# Patient Record
Sex: FEMALE | Race: WHITE | NOT HISPANIC OR LATINO | Employment: OTHER | URBAN - METROPOLITAN AREA
[De-identification: names, ages, dates, MRNs, and addresses within clinical notes are randomized per-mention and may not be internally consistent; named-entity substitution may affect disease eponyms.]

---

## 2018-12-10 ENCOUNTER — HOSPITAL ENCOUNTER (OUTPATIENT)
Dept: CT IMAGING | Facility: HOSPITAL | Age: 53
Discharge: HOME | End: 2018-12-10
Attending: NURSE PRACTITIONER | Admitting: NURSE PRACTITIONER

## 2018-12-10 LAB — CREAT BLDA-MCNC: 0.5 MG/DL (ref 0.6–1.3)

## 2019-02-28 ENCOUNTER — CONVERSION ENCOUNTER (OUTPATIENT)
Dept: URGENT CARE | Facility: CLINIC | Age: 54
End: 2019-02-28

## 2019-06-04 VITALS
HEIGHT: 63 IN | WEIGHT: 203.8 LBS | DIASTOLIC BLOOD PRESSURE: 100 MMHG | SYSTOLIC BLOOD PRESSURE: 171 MMHG | OXYGEN SATURATION: 97 % | BODY MASS INDEX: 36.11 KG/M2 | HEART RATE: 79 BPM

## 2021-05-27 ENCOUNTER — HOSPITAL ENCOUNTER (OUTPATIENT)
Dept: CARDIOLOGY | Facility: HOSPITAL | Age: 56
Discharge: HOME | End: 2021-05-27

## 2021-05-27 ENCOUNTER — TRANSCRIBE ORDERS (OUTPATIENT)
Dept: ADMINISTRATIVE | Facility: HOSPITAL | Age: 56
End: 2021-05-27

## 2021-05-27 ENCOUNTER — LAB (OUTPATIENT)
Dept: LAB | Facility: HOSPITAL | Age: 56
End: 2021-05-27

## 2021-05-27 DIAGNOSIS — I10 BENIGN HYPERTENSION: Primary | ICD-10-CM

## 2021-05-27 DIAGNOSIS — Z01.818 PRE-OP TESTING: ICD-10-CM

## 2021-05-27 DIAGNOSIS — I10 BENIGN HYPERTENSION: ICD-10-CM

## 2021-05-27 LAB
ANION GAP SERPL CALCULATED.3IONS-SCNC: 12.7 MMOL/L (ref 5–15)
APTT PPP: 25.4 SECONDS (ref 24–31)
BASOPHILS # BLD AUTO: 0.11 10*3/MM3 (ref 0–0.2)
BASOPHILS NFR BLD AUTO: 0.8 % (ref 0–1.5)
BUN SERPL-MCNC: 8 MG/DL (ref 6–20)
BUN/CREAT SERPL: 13.6 (ref 7–25)
CALCIUM SPEC-SCNC: 9 MG/DL (ref 8.6–10.5)
CHLORIDE SERPL-SCNC: 103 MMOL/L (ref 98–107)
CO2 SERPL-SCNC: 21.3 MMOL/L (ref 22–29)
CREAT SERPL-MCNC: 0.59 MG/DL (ref 0.57–1)
DEPRECATED RDW RBC AUTO: 38.3 FL (ref 37–54)
EOSINOPHIL # BLD AUTO: 0.36 10*3/MM3 (ref 0–0.4)
EOSINOPHIL NFR BLD AUTO: 2.7 % (ref 0.3–6.2)
ERYTHROCYTE [DISTWIDTH] IN BLOOD BY AUTOMATED COUNT: 12.5 % (ref 12.3–15.4)
GFR SERPL CREATININE-BSD FRML MDRD: 105 ML/MIN/1.73
GLUCOSE SERPL-MCNC: 302 MG/DL (ref 65–99)
HCT VFR BLD AUTO: 46.5 % (ref 34–46.6)
HGB BLD-MCNC: 15.4 G/DL (ref 12–15.9)
IMM GRANULOCYTES # BLD AUTO: 0.13 10*3/MM3 (ref 0–0.05)
IMM GRANULOCYTES NFR BLD AUTO: 1 % (ref 0–0.5)
INR PPP: <0.93 (ref 2–3)
LYMPHOCYTES # BLD AUTO: 4.15 10*3/MM3 (ref 0.7–3.1)
LYMPHOCYTES NFR BLD AUTO: 31.3 % (ref 19.6–45.3)
MCH RBC QN AUTO: 28.4 PG (ref 26.6–33)
MCHC RBC AUTO-ENTMCNC: 33.1 G/DL (ref 31.5–35.7)
MCV RBC AUTO: 85.6 FL (ref 79–97)
MONOCYTES # BLD AUTO: 0.69 10*3/MM3 (ref 0.1–0.9)
MONOCYTES NFR BLD AUTO: 5.2 % (ref 5–12)
NEUTROPHILS NFR BLD AUTO: 59 % (ref 42.7–76)
NEUTROPHILS NFR BLD AUTO: 7.8 10*3/MM3 (ref 1.7–7)
NRBC BLD AUTO-RTO: 0 /100 WBC (ref 0–0.2)
PLATELET # BLD AUTO: 308 10*3/MM3 (ref 140–450)
PMV BLD AUTO: 10.9 FL (ref 6–12)
POTASSIUM SERPL-SCNC: 4.2 MMOL/L (ref 3.5–5.2)
PROTHROMBIN TIME: 9.7 SECONDS (ref 19.4–28.5)
RBC # BLD AUTO: 5.43 10*6/MM3 (ref 3.77–5.28)
SODIUM SERPL-SCNC: 137 MMOL/L (ref 136–145)
WBC # BLD AUTO: 13.24 10*3/MM3 (ref 3.4–10.8)

## 2021-05-27 PROCEDURE — 85730 THROMBOPLASTIN TIME PARTIAL: CPT

## 2021-05-27 PROCEDURE — 85025 COMPLETE CBC W/AUTO DIFF WBC: CPT

## 2021-05-27 PROCEDURE — 80048 BASIC METABOLIC PNL TOTAL CA: CPT

## 2021-05-27 PROCEDURE — 36415 COLL VENOUS BLD VENIPUNCTURE: CPT

## 2021-05-27 PROCEDURE — 85610 PROTHROMBIN TIME: CPT

## 2021-05-27 PROCEDURE — 93005 ELECTROCARDIOGRAM TRACING: CPT | Performed by: OTOLARYNGOLOGY

## 2021-05-27 PROCEDURE — 93010 ELECTROCARDIOGRAM REPORT: CPT | Performed by: INTERNAL MEDICINE

## 2021-09-30 ENCOUNTER — TRANSCRIBE ORDERS (OUTPATIENT)
Dept: ADMINISTRATIVE | Facility: HOSPITAL | Age: 56
End: 2021-09-30

## 2021-09-30 ENCOUNTER — HOSPITAL ENCOUNTER (OUTPATIENT)
Dept: GENERAL RADIOLOGY | Facility: HOSPITAL | Age: 56
Discharge: HOME | End: 2021-09-30
Admitting: NURSE PRACTITIONER

## 2021-09-30 DIAGNOSIS — M54.9 BACK PAIN, UNSPECIFIED BACK LOCATION, UNSPECIFIED BACK PAIN LATERALITY, UNSPECIFIED CHRONICITY: ICD-10-CM

## 2021-09-30 DIAGNOSIS — M54.9 BACK PAIN, UNSPECIFIED BACK LOCATION, UNSPECIFIED BACK PAIN LATERALITY, UNSPECIFIED CHRONICITY: Primary | ICD-10-CM

## 2021-09-30 PROCEDURE — 72110 X-RAY EXAM L-2 SPINE 4/>VWS: CPT

## 2022-08-07 ENCOUNTER — HOSPITAL ENCOUNTER (EMERGENCY)
Facility: HOSPITAL | Age: 57
Discharge: HOME | End: 2022-08-07
Attending: EMERGENCY MEDICINE | Admitting: EMERGENCY MEDICINE

## 2022-08-07 VITALS
RESPIRATION RATE: 18 BRPM | SYSTOLIC BLOOD PRESSURE: 136 MMHG | TEMPERATURE: 98 F | WEIGHT: 175.49 LBS | OXYGEN SATURATION: 99 % | HEART RATE: 70 BPM | DIASTOLIC BLOOD PRESSURE: 70 MMHG | HEIGHT: 63 IN | BODY MASS INDEX: 31.09 KG/M2

## 2022-08-07 DIAGNOSIS — A09 TRAVELER'S DIARRHEA: Primary | ICD-10-CM

## 2022-08-07 LAB
ADV 40+41 DNA STL QL NAA+NON-PROBE: NOT DETECTED
ALBUMIN SERPL-MCNC: 4.1 G/DL (ref 3.5–5.2)
ALBUMIN/GLOB SERPL: 1.3 G/DL
ALP SERPL-CCNC: 109 U/L (ref 39–117)
ALT SERPL W P-5'-P-CCNC: 24 U/L (ref 1–33)
ANION GAP SERPL CALCULATED.3IONS-SCNC: 13 MMOL/L (ref 5–15)
AST SERPL-CCNC: 22 U/L (ref 1–32)
ASTRO TYP 1-8 RNA STL QL NAA+NON-PROBE: NOT DETECTED
BASOPHILS # BLD AUTO: 0.1 10*3/MM3 (ref 0–0.2)
BASOPHILS NFR BLD AUTO: 0.7 % (ref 0–1.5)
BILIRUB SERPL-MCNC: 0.3 MG/DL (ref 0–1.2)
BUN SERPL-MCNC: 12 MG/DL (ref 6–20)
BUN/CREAT SERPL: 16.9 (ref 7–25)
C CAYETANENSIS DNA STL QL NAA+NON-PROBE: NOT DETECTED
C COLI+JEJ+UPSA DNA STL QL NAA+NON-PROBE: NOT DETECTED
CALCIUM SPEC-SCNC: 9.2 MG/DL (ref 8.6–10.5)
CHLORIDE SERPL-SCNC: 105 MMOL/L (ref 98–107)
CO2 SERPL-SCNC: 21 MMOL/L (ref 22–29)
CREAT SERPL-MCNC: 0.71 MG/DL (ref 0.57–1)
CRYPTOSP DNA STL QL NAA+NON-PROBE: NOT DETECTED
DEPRECATED RDW RBC AUTO: 43.8 FL (ref 37–54)
E HISTOLYT DNA STL QL NAA+NON-PROBE: NOT DETECTED
EAEC PAA PLAS AGGR+AATA ST NAA+NON-PRB: DETECTED
EC STX1+STX2 GENES STL QL NAA+NON-PROBE: NOT DETECTED
EGFRCR SERPLBLD CKD-EPI 2021: 98.7 ML/MIN/1.73
EOSINOPHIL # BLD AUTO: 0.2 10*3/MM3 (ref 0–0.4)
EOSINOPHIL NFR BLD AUTO: 1.4 % (ref 0.3–6.2)
EPEC EAE GENE STL QL NAA+NON-PROBE: DETECTED
ERYTHROCYTE [DISTWIDTH] IN BLOOD BY AUTOMATED COUNT: 14.7 % (ref 12.3–15.4)
ETEC LTA+ST1A+ST1B TOX ST NAA+NON-PROBE: DETECTED
G LAMBLIA DNA STL QL NAA+NON-PROBE: NOT DETECTED
GLOBULIN UR ELPH-MCNC: 3.1 GM/DL
GLUCOSE SERPL-MCNC: 220 MG/DL (ref 65–99)
HCT VFR BLD AUTO: 44.7 % (ref 34–46.6)
HGB BLD-MCNC: 14.9 G/DL (ref 12–15.9)
HOLD SPECIMEN: NORMAL
HOLD SPECIMEN: NORMAL
LIPASE SERPL-CCNC: 25 U/L (ref 13–60)
LYMPHOCYTES # BLD AUTO: 4.3 10*3/MM3 (ref 0.7–3.1)
LYMPHOCYTES NFR BLD AUTO: 37.5 % (ref 19.6–45.3)
MCH RBC QN AUTO: 28 PG (ref 26.6–33)
MCHC RBC AUTO-ENTMCNC: 33.5 G/DL (ref 31.5–35.7)
MCV RBC AUTO: 83.6 FL (ref 79–97)
MONOCYTES # BLD AUTO: 1.2 10*3/MM3 (ref 0.1–0.9)
MONOCYTES NFR BLD AUTO: 10.2 % (ref 5–12)
NEUTROPHILS NFR BLD AUTO: 5.8 10*3/MM3 (ref 1.7–7)
NEUTROPHILS NFR BLD AUTO: 50.2 % (ref 42.7–76)
NOROVIRUS GI+II RNA STL QL NAA+NON-PROBE: NOT DETECTED
NRBC BLD AUTO-RTO: 0.1 /100 WBC (ref 0–0.2)
P SHIGELLOIDES DNA STL QL NAA+NON-PROBE: NOT DETECTED
PLATELET # BLD AUTO: 325 10*3/MM3 (ref 140–450)
PMV BLD AUTO: 7.8 FL (ref 6–12)
POTASSIUM SERPL-SCNC: 3.3 MMOL/L (ref 3.5–5.2)
PROT SERPL-MCNC: 7.2 G/DL (ref 6–8.5)
RBC # BLD AUTO: 5.34 10*6/MM3 (ref 3.77–5.28)
RVA RNA STL QL NAA+NON-PROBE: NOT DETECTED
S ENT+BONG DNA STL QL NAA+NON-PROBE: NOT DETECTED
SAPO I+II+IV+V RNA STL QL NAA+NON-PROBE: NOT DETECTED
SHIGELLA SP+EIEC IPAH ST NAA+NON-PROBE: NOT DETECTED
SODIUM SERPL-SCNC: 139 MMOL/L (ref 136–145)
V CHOL+PARA+VUL DNA STL QL NAA+NON-PROBE: NOT DETECTED
V CHOLERAE DNA STL QL NAA+NON-PROBE: NOT DETECTED
WBC NRBC COR # BLD: 11.6 10*3/MM3 (ref 3.4–10.8)
WHOLE BLOOD HOLD COAG: NORMAL
WHOLE BLOOD HOLD SPECIMEN: NORMAL
Y ENTEROCOL DNA STL QL NAA+NON-PROBE: NOT DETECTED

## 2022-08-07 PROCEDURE — 36415 COLL VENOUS BLD VENIPUNCTURE: CPT | Performed by: EMERGENCY MEDICINE

## 2022-08-07 PROCEDURE — 83690 ASSAY OF LIPASE: CPT | Performed by: EMERGENCY MEDICINE

## 2022-08-07 PROCEDURE — 99283 EMERGENCY DEPT VISIT LOW MDM: CPT

## 2022-08-07 PROCEDURE — 85025 COMPLETE CBC W/AUTO DIFF WBC: CPT | Performed by: EMERGENCY MEDICINE

## 2022-08-07 PROCEDURE — 80053 COMPREHEN METABOLIC PANEL: CPT | Performed by: EMERGENCY MEDICINE

## 2022-08-07 PROCEDURE — 87507 IADNA-DNA/RNA PROBE TQ 12-25: CPT | Performed by: EMERGENCY MEDICINE

## 2022-08-07 RX ORDER — SODIUM CHLORIDE 0.9 % (FLUSH) 0.9 %
10 SYRINGE (ML) INJECTION AS NEEDED
Status: DISCONTINUED | OUTPATIENT
Start: 2022-08-07 | End: 2022-08-07 | Stop reason: HOSPADM

## 2022-08-07 RX ORDER — AZITHROMYCIN 500 MG/1
500 TABLET, FILM COATED ORAL DAILY
Qty: 3 TABLET | Refills: 0 | Status: SHIPPED | OUTPATIENT
Start: 2022-08-07 | End: 2022-08-10

## 2022-08-07 RX ADMIN — SODIUM CHLORIDE 1000 ML: 9 INJECTION, SOLUTION INTRAVENOUS at 16:41

## 2022-08-07 NOTE — ED PROVIDER NOTES
"Subjective   History of Present Illness  History Provided By: Patient    Chief Complaint: Abdominal cramping, diarrhea since being in [facility] last week  Onset: 5 days ago  Timing: Not improved  Location: GI  Quality: Initially nonbloody, then started having small amount of blood in stool.  Stool turned black after she started taking Pepto-Bismol.  Severity: Moderate  Modifying Factors: None    Other: Started after she ate at a steak house and [employer] [place].    Review of Systems   Constitutional: Negative for chills and fever.   Respiratory: Negative for shortness of breath.    Cardiovascular: Negative for chest pain.   Gastrointestinal: Positive for diarrhea and nausea. Negative for vomiting.   All other systems reviewed and are negative.      No past medical history on file.    Allergies   Allergen Reactions   • Aspirin GI Intolerance and Other (See Comments)     Nausea and vomiting          No past surgical history on file.    No family history on file.    Social History     Socioeconomic History   • Marital status:            Objective   Physical Exam  Constitutional:  No acute distress.  Head:  Atraumatic.  Normocephalic.   Eyes:  No scleral icterus. Normal conjunctiva  ENT:  Moist mucosa.  No nasal discharge present.  Cardiovascular:  Well perfused.  Equal pulses.  Regular rate.  Normal capillary refill.    Pulmonary/Chest:  No respiratory distress.  Airway patent.  No tachypnea.  No accessory muscle usage.    Abdominal:  Non-distended. Non-tender.   Extremities:  No peripheral edema.  No Deformity  Skin:  Warm, dry  Neurological:  Alert, awake, and appropriate.  Normal speech.      Procedures           ED Course      /71 (BP Location: Left arm, Patient Position: Sitting)   Pulse 77   Temp 97.5 °F (36.4 °C) (Temporal)   Resp 15   Ht 160 cm (63\")   Wt 79.6 kg (175 lb 7.8 oz)   SpO2 99%   BMI 31.09 kg/m²   Labs Reviewed   COMPREHENSIVE METABOLIC PANEL - Abnormal; Notable for the following " components:       Result Value    Glucose 220 (*)     Potassium 3.3 (*)     CO2 21.0 (*)     All other components within normal limits    Narrative:     GFR Normal >60  Chronic Kidney Disease <60  Kidney Failure <15     CBC WITH AUTO DIFFERENTIAL - Abnormal; Notable for the following components:    WBC 11.60 (*)     RBC 5.34 (*)     Lymphocytes, Absolute 4.30 (*)     Monocytes, Absolute 1.20 (*)     All other components within normal limits   LIPASE - Normal   GASTROINTESTINAL PANEL, PCR   RAINBOW DRAW    Narrative:     The following orders were created for panel order [place] Draw.  Procedure                               Abnormality         Status                     ---------                               -----------         ------                     Green Top (Gel)[[id number]]                                  In process                 Lavender Top[[id number]]                                     In process                 Gold Top - SST[[id number]]                                   In process                 Light Blue Top[[id number]]                                   In process                   Please view results for these tests on the individual orders.   CBC AND DIFFERENTIAL    Narrative:     The following orders were created for panel order CBC & Differential.  Procedure                               Abnormality         Status                     ---------                               -----------         ------                     CBC Auto Differential[[id number]]        Abnormal            Final result                 Please view results for these tests on the individual orders.   GREEN TOP   LAVENDER TOP   GOLD TOP - SST   LIGHT BLUE TOP     Medications   sodium chloride 0.9 % flush 10 mL (has no administration in time range)   sodium chloride 0.9 % bolus 1,000 mL (1,000 mL Intravenous New Bag 8/7/22 1641)     No radiology results for the last day                                       MDM  Patient having  black stools with this started after starting Pepto-Bismol.  Was having small amounts of blood in stool beforehand.  Believe fecal occult blood test be unuseful even if positive.  Hemoglobin stable.  Suspect this is from Pepto-Bismol.    Hemoglobin stable from when checked in May 2021.  15.4 then.  14.8 now.  Abdomen benign.  Given small amount of blood in stool we will treat with antibiotics.  Stool culture sent.  Patient will follow-up with PCP.  Will give azithromycin 500 mg for 3 days.  Patient agreeable with plan.  Return ER precautions discussed.  Final diagnoses:   Traveler's diarrhea       ED Disposition  ED Disposition     ED Disposition   Discharge    Condition   Stable    Comment   --             Collin Low, APRN  [address] [facility] [place] [address] [phone]    In 3 days           Medication List      New Prescriptions    azithromycin 500 MG tablet  Commonly known as: ZITHROMAX  Take 1 tablet by mouth Daily for 3 days.           Where to Get Your Medications      These medications were sent to [facility]/pharmacy #[id number] - [place], [place] - [address] [address] - [phone] [place] [phone]   [address] [address] [address]    Phone: [phone]   · azithromycin 500 MG tablet          Nehemiah Caceres MD  08/07/22 7879

## 2022-08-09 NOTE — PHARMACY RECOMMENDATION
Patient GI panel positive for enteroaggregative, enterotoxic, and enteropathogenic E Coli after returning from [facility]. Patient also had symptoms of nausea and bloody diarrhea. Prescribed azithromycin at discharge. Appropriate therapy, no interventions will be made.    Microbiology Results (last 10 days)       Procedure Component Value - Date/Time    Gastrointestinal Panel, PCR - Stool, Per Rectum [[id number]]  (Abnormal) Collected: 08/07/22 1640    Lab Status: Final result Specimen: Stool from Per Rectum Updated: 08/07/22 1825     Campylobacter Not Detected     Plesiomonas shigelloides Not Detected     Salmonella Not Detected     Vibrio Not Detected     Vibrio cholerae Not Detected     Yersinia enterocolitica Not Detected     Enteroaggregative E. coli (EAEC) Detected     Enteropathogenic E. coli (EPEC) Detected     Enterotoxigenic E. coli (ETEC) lt/st Detected     Shiga-like toxin-producing E. coli (STEC) stx1/stx2 Not Detected     Shigella/Enteroinvasive E. coli (EIEC) Not Detected     Cryptosporidium Not Detected     Cyclospora cayetanensis Not Detected     Entamoeba histolytica Not Detected     Giardia lamblia Not Detected     Adenovirus F40/41 Not Detected     Astrovirus Not Detected     Norovirus GI/GII Not Detected     Rotavirus A Not Detected     Sapovirus (I, II, IV or V) Not Detected            Brennan Luna, Pharmacy Intern  8/9/2022 15:27 EDT

## 2022-09-06 ENCOUNTER — HOSPITAL ENCOUNTER (INPATIENT)
Facility: HOSPITAL | Age: 57
LOS: 6 days | Discharge: HOME | End: 2022-09-12
Attending: EMERGENCY MEDICINE | Admitting: STUDENT IN AN ORGANIZED HEALTH CARE EDUCATION/TRAINING PROGRAM

## 2022-09-06 ENCOUNTER — APPOINTMENT (OUTPATIENT)
Dept: CT IMAGING | Facility: HOSPITAL | Age: 57
End: 2022-09-06

## 2022-09-06 DIAGNOSIS — M79.604 RIGHT LEG PAIN: ICD-10-CM

## 2022-09-06 DIAGNOSIS — I70.209 OCCLUSION OF ARTERY OF LEG: ICD-10-CM

## 2022-09-06 DIAGNOSIS — N30.01 ACUTE CYSTITIS WITH HEMATURIA: ICD-10-CM

## 2022-09-06 DIAGNOSIS — T82.868A: Primary | ICD-10-CM

## 2022-09-06 LAB
ALBUMIN SERPL-MCNC: 4.3 G/DL (ref 3.5–5.2)
ALBUMIN/GLOB SERPL: 1.6 G/DL
ALP SERPL-CCNC: 101 U/L (ref 39–117)
ALT SERPL W P-5'-P-CCNC: 23 U/L (ref 1–33)
ANION GAP SERPL CALCULATED.3IONS-SCNC: 14 MMOL/L (ref 5–15)
APTT PPP: 26.2 SECONDS (ref 61–76.5)
AST SERPL-CCNC: 20 U/L (ref 1–32)
BACTERIA UR QL AUTO: ABNORMAL /HPF
BASOPHILS # BLD AUTO: 0.1 10*3/MM3 (ref 0–0.2)
BASOPHILS NFR BLD AUTO: 0.8 % (ref 0–1.5)
BILIRUB SERPL-MCNC: 0.3 MG/DL (ref 0–1.2)
BILIRUB UR QL STRIP: NEGATIVE
BUN SERPL-MCNC: 11 MG/DL (ref 6–20)
BUN/CREAT SERPL: 15.5 (ref 7–25)
CALCIUM SPEC-SCNC: 9.4 MG/DL (ref 8.6–10.5)
CHLORIDE SERPL-SCNC: 98 MMOL/L (ref 98–107)
CLARITY UR: ABNORMAL
CO2 SERPL-SCNC: 25 MMOL/L (ref 22–29)
COLOR UR: YELLOW
CREAT SERPL-MCNC: 0.71 MG/DL (ref 0.57–1)
DEPRECATED RDW RBC AUTO: 43.8 FL (ref 37–54)
EGFRCR SERPLBLD CKD-EPI 2021: 98.7 ML/MIN/1.73
EOSINOPHIL # BLD AUTO: 0.2 10*3/MM3 (ref 0–0.4)
EOSINOPHIL NFR BLD AUTO: 1.9 % (ref 0.3–6.2)
ERYTHROCYTE [DISTWIDTH] IN BLOOD BY AUTOMATED COUNT: 14.1 % (ref 12.3–15.4)
GLOBULIN UR ELPH-MCNC: 2.7 GM/DL
GLUCOSE SERPL-MCNC: 373 MG/DL (ref 65–99)
GLUCOSE UR STRIP-MCNC: ABNORMAL MG/DL
HCT VFR BLD AUTO: 46.3 % (ref 34–46.6)
HGB BLD-MCNC: 15.4 G/DL (ref 12–15.9)
HGB UR QL STRIP.AUTO: ABNORMAL
HOLD SPECIMEN: NORMAL
HYALINE CASTS UR QL AUTO: ABNORMAL /LPF
INR PPP: <0.93 (ref 0.93–1.1)
KETONES UR QL STRIP: NEGATIVE
LEUKOCYTE ESTERASE UR QL STRIP.AUTO: ABNORMAL
LYMPHOCYTES # BLD AUTO: 4 10*3/MM3 (ref 0.7–3.1)
LYMPHOCYTES NFR BLD AUTO: 34.6 % (ref 19.6–45.3)
MCH RBC QN AUTO: 28.7 PG (ref 26.6–33)
MCHC RBC AUTO-ENTMCNC: 33.2 G/DL (ref 31.5–35.7)
MCV RBC AUTO: 86.4 FL (ref 79–97)
MONOCYTES # BLD AUTO: 0.7 10*3/MM3 (ref 0.1–0.9)
MONOCYTES NFR BLD AUTO: 6.3 % (ref 5–12)
NEUTROPHILS NFR BLD AUTO: 56.4 % (ref 42.7–76)
NEUTROPHILS NFR BLD AUTO: 6.5 10*3/MM3 (ref 1.7–7)
NITRITE UR QL STRIP: POSITIVE
NRBC BLD AUTO-RTO: 0.1 /100 WBC (ref 0–0.2)
PH UR STRIP.AUTO: <=5 [PH] (ref 5–8)
PLATELET # BLD AUTO: 306 10*3/MM3 (ref 140–450)
PMV BLD AUTO: 8.4 FL (ref 6–12)
POTASSIUM SERPL-SCNC: 4.1 MMOL/L (ref 3.5–5.2)
PROT SERPL-MCNC: 7 G/DL (ref 6–8.5)
PROT UR QL STRIP: ABNORMAL
PROTHROMBIN TIME: 9.3 SECONDS (ref 9.6–11.7)
RBC # BLD AUTO: 5.36 10*6/MM3 (ref 3.77–5.28)
RBC # UR STRIP: ABNORMAL /HPF
REF LAB TEST METHOD: ABNORMAL
SODIUM SERPL-SCNC: 137 MMOL/L (ref 136–145)
SP GR UR STRIP: 1.03 (ref 1–1.03)
SQUAMOUS #/AREA URNS HPF: ABNORMAL /HPF
UROBILINOGEN UR QL STRIP: ABNORMAL
WBC # UR STRIP: ABNORMAL /HPF
WBC NRBC COR # BLD: 11.5 10*3/MM3 (ref 3.4–10.8)

## 2022-09-06 PROCEDURE — 85730 THROMBOPLASTIN TIME PARTIAL: CPT | Performed by: PHYSICIAN ASSISTANT

## 2022-09-06 PROCEDURE — 25010000002 CEFTRIAXONE PER 250 MG: Performed by: PHYSICIAN ASSISTANT

## 2022-09-06 PROCEDURE — 80053 COMPREHEN METABOLIC PANEL: CPT | Performed by: PHYSICIAN ASSISTANT

## 2022-09-06 PROCEDURE — 99285 EMERGENCY DEPT VISIT HI MDM: CPT

## 2022-09-06 PROCEDURE — 81001 URINALYSIS AUTO W/SCOPE: CPT | Performed by: PHYSICIAN ASSISTANT

## 2022-09-06 PROCEDURE — 75635 CT ANGIO ABDOMINAL ARTERIES: CPT

## 2022-09-06 PROCEDURE — 36415 COLL VENOUS BLD VENIPUNCTURE: CPT

## 2022-09-06 PROCEDURE — 85025 COMPLETE CBC W/AUTO DIFF WBC: CPT | Performed by: PHYSICIAN ASSISTANT

## 2022-09-06 PROCEDURE — 0 IOPAMIDOL PER 1 ML: Performed by: PHYSICIAN ASSISTANT

## 2022-09-06 PROCEDURE — 99223 1ST HOSP IP/OBS HIGH 75: CPT | Performed by: NURSE PRACTITIONER

## 2022-09-06 PROCEDURE — 87040 BLOOD CULTURE FOR BACTERIA: CPT | Performed by: PHYSICIAN ASSISTANT

## 2022-09-06 PROCEDURE — 25010000002 HEPARIN (PORCINE) 25000-0.45 UT/250ML-% SOLUTION: Performed by: NURSE PRACTITIONER

## 2022-09-06 PROCEDURE — 85610 PROTHROMBIN TIME: CPT | Performed by: PHYSICIAN ASSISTANT

## 2022-09-06 RX ORDER — CHOLECALCIFEROL (VITAMIN D3) 1250 MCG
50000 CAPSULE ORAL
COMMUNITY

## 2022-09-06 RX ORDER — BUPROPION HYDROCHLORIDE 450 MG/1
450 TABLET, FILM COATED, EXTENDED RELEASE ORAL EVERY MORNING
COMMUNITY

## 2022-09-06 RX ORDER — METOPROLOL TARTRATE 100 MG/1
100 TABLET ORAL 2 TIMES DAILY
COMMUNITY

## 2022-09-06 RX ORDER — GLIMEPIRIDE 2 MG/1
2 TABLET ORAL DAILY
COMMUNITY

## 2022-09-06 RX ORDER — CLOPIDOGREL BISULFATE 75 MG/1
75 TABLET ORAL DAILY
Status: ON HOLD | COMMUNITY
End: 2022-09-12 | Stop reason: SDUPTHER

## 2022-09-06 RX ORDER — HYDROXYZINE HYDROCHLORIDE 25 MG/1
100 TABLET, FILM COATED ORAL NIGHTLY
Status: DISCONTINUED | OUTPATIENT
Start: 2022-09-06 | End: 2022-09-12 | Stop reason: HOSPADM

## 2022-09-06 RX ORDER — SODIUM CHLORIDE 0.9 % (FLUSH) 0.9 %
10 SYRINGE (ML) INJECTION AS NEEDED
Status: DISCONTINUED | OUTPATIENT
Start: 2022-09-06 | End: 2022-09-12 | Stop reason: HOSPADM

## 2022-09-06 RX ORDER — DULOXETIN HYDROCHLORIDE 20 MG/1
40 CAPSULE, DELAYED RELEASE ORAL DAILY
Status: DISCONTINUED | OUTPATIENT
Start: 2022-09-07 | End: 2022-09-12 | Stop reason: HOSPADM

## 2022-09-06 RX ORDER — METOPROLOL TARTRATE 50 MG/1
100 TABLET, FILM COATED ORAL 2 TIMES DAILY
Status: DISCONTINUED | OUTPATIENT
Start: 2022-09-06 | End: 2022-09-12 | Stop reason: HOSPADM

## 2022-09-06 RX ORDER — HYDROXYZINE 50 MG/1
100 TABLET, FILM COATED ORAL NIGHTLY
COMMUNITY

## 2022-09-06 RX ORDER — HYDROCODONE BITARTRATE AND ACETAMINOPHEN 7.5; 325 MG/1; MG/1
1 TABLET ORAL EVERY 6 HOURS PRN
Status: DISCONTINUED | OUTPATIENT
Start: 2022-09-06 | End: 2022-09-08

## 2022-09-06 RX ORDER — ISOSORBIDE MONONITRATE 60 MG/1
60 TABLET, EXTENDED RELEASE ORAL DAILY
Status: DISCONTINUED | OUTPATIENT
Start: 2022-09-07 | End: 2022-09-12 | Stop reason: HOSPADM

## 2022-09-06 RX ORDER — CLOPIDOGREL BISULFATE 75 MG/1
75 TABLET ORAL DAILY
Status: DISCONTINUED | OUTPATIENT
Start: 2022-09-07 | End: 2022-09-09

## 2022-09-06 RX ORDER — DULOXETIN HYDROCHLORIDE 20 MG/1
40 CAPSULE, DELAYED RELEASE ORAL DAILY
COMMUNITY

## 2022-09-06 RX ORDER — HEPARIN SODIUM 10000 [USP'U]/100ML
18 INJECTION, SOLUTION INTRAVENOUS
Status: DISCONTINUED | OUTPATIENT
Start: 2022-09-06 | End: 2022-09-08

## 2022-09-06 RX ORDER — BUPROPION HYDROCHLORIDE 150 MG/1
450 TABLET ORAL EVERY MORNING
Status: DISCONTINUED | OUTPATIENT
Start: 2022-09-07 | End: 2022-09-12 | Stop reason: HOSPADM

## 2022-09-06 RX ORDER — ISOSORBIDE MONONITRATE 60 MG/1
60 TABLET, EXTENDED RELEASE ORAL DAILY
COMMUNITY

## 2022-09-06 RX ADMIN — IOPAMIDOL 100 ML: 755 INJECTION, SOLUTION INTRAVENOUS at 15:06

## 2022-09-06 RX ADMIN — HEPARIN SODIUM 18 UNITS/KG/HR: 10000 INJECTION, SOLUTION INTRAVENOUS at 18:32

## 2022-09-06 RX ADMIN — CEFTRIAXONE 1 G: 1 INJECTION, POWDER, FOR SOLUTION INTRAMUSCULAR; INTRAVENOUS at 17:43

## 2022-09-06 RX ADMIN — HYDROXYZINE HYDROCHLORIDE 100 MG: 25 TABLET, FILM COATED ORAL at 23:47

## 2022-09-06 RX ADMIN — METOPROLOL TARTRATE 100 MG: 50 TABLET, FILM COATED ORAL at 23:47

## 2022-09-06 NOTE — CASE MANAGEMENT/SOCIAL WORK
"Discharge Planning Assessment  [facility]     Patient Name: Blossom Martin  MRN: [id number]  Today's Date: 9/6/2022    Admit Date: 9/6/2022     Discharge Needs Assessment     Row Name 09/06/22 1857       Living Environment    People in Home child(tramaine), adult;grandchild(tramaine)    Name(s) of People in Home Patient lives with daughter (Ines) and son -in-law  (Clint) and grandchildren    Current Living Arrangements home;other (see comments)  Pt reports she lives in a mother-in-law's suite in the basement that is accessible by stairs    Primary Care Provided by self    Provides Primary Care For grandchild(tramaine)    Caregiving Concerns Pt reports she assists in the care of her grandchildren    Family Caregiver if Needed child(tramaine), adult    Family Caregiver Names Ines and Clint    Quality of Family Relationships supportive;helpful    Able to Return to Prior Arrangements yes       Resource/Environmental Concerns    Resource/Environmental Concerns none    Transportation Concerns none       Transition Planning    Patient/Family Anticipates Transition to home with family    Patient/Family Anticipated Services at Transition none    Transportation Anticipated family or friend will provide  Daughter       Discharge Needs Assessment    Equipment Currently Used at Home none    Concerns to be Addressed relationship    Concerns Comments Pt reports her \"life is upside down right now\" between her own health issues, a grandchild with RSV ,and being in the process of a divorce. She is agreeable to speaking with .    Anticipated Changes Related to Illness inability to care for someone else               Discharge Plan     Row Name 09/06/22 1910       Plan    Plan Home with family    Patient/Family in Agreement with Plan yes    Plan Comments Pt reports she lives with her daughter,son-in-law, and minor grandchildren.States she lives in a mother-in-law's suite in the basement.Reports she provides care for grandkids,and reports " "concern that one has been diagnosed with RSV.She reports she moved in with them after she \"came home and found my  smoking a crackpipe\". States she has filed for divorce.Reports multiple stressors in life due to her health,divorce,and grandchild's diagnosis.Would like to speak with .Order obtained and secure chat sent to  Jacqui.Pt confirms PCP. Wants pharmacy changed to meds to bed, which is now updated in EP[facility]. She intends to return home at dc,and currently denies dc needs.              Continued Care and Services - Admitted Since 9/6/2022    Coordination has not been started for this encounter.       Expected Discharge Date and Time     Expected Discharge Date Expected Discharge Time    Sep 8, 2022          Demographic Summary     Row Name 09/06/22 8213       General Information    Admission Type other (see comments)  Admission planned. Await MD/NP to place admit status order    Arrived From home    Referral Source admission list    Reason for Consult discharge planning    Preferred Language English       Contact Information    Permission Granted to Share Info With                Functional Status     Row Name 09/06/22 7796       Functional Status    Usual Activity Tolerance good    Current Activity Tolerance moderate       Functional Status, IADL    Medications independent    Meal Preparation independent    Housekeeping independent    Laundry independent    Shopping independent              Lea Cavazos RN, [facility]  Office: [phone]  Fax: [phone]  [email or web address]      I met with patient in room wearing PPE: mask and goggles.     Maintained distance greater than six feet and spent </=15 minutes in the room          Lea Cavazos RN    "

## 2022-09-06 NOTE — H&P
[facility] Medicine Services      Patient Name: Blossom Martin  : 1965  MRN: [id number]  Primary Care Physician:  Collin Low APRN  Date of admission: 2022      Subjective      Chief Complaint:     History of Present Illness: Blossom Martin is a 57 y.o. female who presented to [facility] on 2022 complaining of right lower extremity pain which is worsened over the last 48 hours.  The pain is significantly worsened with walking and is described as a cramping pain in the lower extremity as well as a hot poker burning pain in the right groin.  The patient denies any known injury.    Review of records: Patient had a right iliac stent placed secondary to peripheral vascular disease approximately a year ago at [facility].  Decision to request these records has been made and order placed. Cad with last cath aug 2020 50,70lad, 80smallom 70rca. no stent. ECho at that time was normal.  She is followed by Dr. Truong.       Review of Systems   Cardiovascular: Positive for claudication. Negative for chest pain.   Respiratory: Negative for shortness of breath.    Skin: Positive for color change.        Discoloration to the right foot   Musculoskeletal: Positive for joint pain.        Pain to the right lower extremity   Gastrointestinal: Negative for nausea.   All other systems reviewed and are negative.       Personal History     Past Medical History:   Diagnosis Date   • CAD (coronary artery disease)    • Diabetes (HCC)    • Hypertension        Past Surgical History:   Procedure Laterality Date   • LEFT HEART CATH         Family History: family history includes No Known Problems in her father and mother. Otherwise pertinent FHx was reviewed and not pertinent to current issue.    Social History:  reports that she has never smoked. She has never used smokeless tobacco. She reports that she does not drink alcohol and does not use drugs.    Home  Medications:  Prior to Admission medications    Medication Sig Start Date End Date Taking? Authorizing Provider   buPROPion XL (FORFIVO XL) 450 MG 24 hr tablet Take 450 mg by mouth Every Morning.   Yes Kimberly Hair MD   Cholecalciferol (Vitamin D3) 1.25 MG (80903 UT) capsule Take 50,000 Units by mouth Every 7 (Seven) Days. Wednesdays   Yes Kimberly Hair MD   clopidogrel (PLAVIX) 75 MG tablet Take 75 mg by mouth Daily.   Yes Kimberly Hair MD   DULoxetine (CYMBALTA) 20 MG capsule Take 40 mg by mouth Daily.   Yes Kimberly Hair MD   glimepiride (AMARYL) 2 MG tablet Take 2 mg by mouth Daily.   Yes Kimberly Hair MD   hydrOXYzine (ATARAX) 50 MG tablet Take 100 mg by mouth Every Night.   Yes Kimberly Hair MD   isosorbide mononitrate (IMDUR) 60 MG 24 hr tablet Take 60 mg by mouth Daily.   Yes Kimberly Hair MD   metoprolol tartrate (LOPRESSOR) 100 MG tablet Take 100 mg by mouth 2 (Two) Times a Day.   Yes Kimberly Hair MD   SITagliptin (JANUVIA) 100 MG tablet Take 100 mg by mouth Daily.   Yes Kimberly Hair MD             Allergies:  Allergies   Allergen Reactions   • Aspirin GI Intolerance and Other (See Comments)     Nausea and vomiting          Objective      Vitals:   Temp:  [97.6 °F (36.4 °C)] 97.6 °F (36.4 °C)  Heart Rate:  [68-88] 68  Resp:  [18-20] 20  BP: (120-142)/(64-98) 123/98    Physical Exam  Vitals and nursing note reviewed.   Constitutional:       Appearance: Normal appearance. She is not ill-appearing.   HENT:      Head: Normocephalic and atraumatic.      Right Ear: External ear normal.      Left Ear: External ear normal.      Nose: Nose normal.      Mouth/Throat:      Mouth: Mucous membranes are moist.   Eyes:      General: No scleral icterus.        Right eye: No discharge.         Left eye: No discharge.      Extraocular Movements: Extraocular movements intact.      Conjunctiva/sclera: Conjunctivae normal.      Pupils: Pupils are equal,  round, and reactive to light.   Cardiovascular:      Rate and Rhythm: Normal rate and regular rhythm.      Pulses:           Radial pulses are 2+ on the right side and 2+ on the left side.        Dorsalis pedis pulses are 0 on the right side and 2+ on the left side.      Heart sounds: Normal heart sounds. No murmur heard.  Pulmonary:      Effort: Pulmonary effort is normal.      Breath sounds: Normal breath sounds.   Abdominal:      General: Bowel sounds are normal.      Palpations: Abdomen is soft.   Musculoskeletal:         General: Normal range of motion.      Cervical back: Normal range of motion and neck supple.      Right lower leg: No edema.      Left lower leg: No edema.   Skin:     General: Skin is warm and dry.      Capillary Refill: Capillary refill takes less than 2 seconds.   Neurological:      General: No focal deficit present.      Mental Status: She is alert and oriented to person, place, and time.   Psychiatric:         Mood and Affect: Mood normal.         Behavior: Behavior normal.         Thought Content: Thought content normal.         Judgment: Judgment normal.       Result Review    Result Review:  I have personally reviewed the results from the time of this admission to 9/6/2022 19:29 EDT and agree with these findings:  [x]  Laboratory  [x]  Microbiology  [x]  Radiology  []  EKG/Telemetry   []  Cardiology/Vascular   []  Pathology  [x]  Old records  []  Other:  Most notable findings include: Abnormal CTA with right iliac stent occlusion      Assessment & Plan        Active Hospital Problems:  Active Hospital Problems    Diagnosis    • **Arterial stent thrombosis, initial encounter ([facility])    • Right leg pain      Plan:     Arterial stenosis with right iliac stent occlusion: Analgesics as needed; vascular surgery consult  -History of peripheral vascular disease    CAD, nonobstructive: Continue aspirin; continue metoprolol; continue isosorbide    Anxiety, chronic: Continue Wellbutrin; continue  Cymbalta; continue Atarax    Diabetes type 2, chronic: Add sliding scale; hold Amaryl; continue Januvia; check hemoglobin A1c    Hypertension, chronic: Continue metoprolol; continue isosorbide       DVT prophylaxis:  Medical DVT prophylaxis orders are present.    CODE STATUS:       Admission Status:  I believe this patient meets inpatient status.    I discussed the patient's findings and my recommendations with patient.    This patient has been examined wearing appropriate Personal Protective Equipment. 09/06/22      Signature: Electronically signed by SOREN Liu, 09/06/22, 10:59 PM EDT.

## 2022-09-06 NOTE — ED PROVIDER NOTES
Subjective   PIT    History:  Patient is a 57-year-old female who presents with right flank and leg pain.  She reports she has a history of bilateral lower leg stent placement.  She reports over the last week she has had increasing leg pain and calf pain.  She feels like her foot is changing colors when compared to her left.  She reports that she had vascular surgery a year ago she does not know her surgeon.  She reports her vascular surgeon is located in [place].    Onset: 1 week  Location: Right flank and leg  Duration: Constant  Character: Sharp pain  Aggravating/Alleviating factors: None  Radiation none  Severity: Moderate            Review of Systems   Constitutional: Negative for chills, diaphoresis, fatigue and fever.   HENT: Negative for congestion, ear pain, sinus pressure, sore throat, trouble swallowing and voice change.    Respiratory: Negative for cough.    Cardiovascular: Negative for chest pain and palpitations.   Gastrointestinal: Negative for abdominal distention, nausea and vomiting.   Genitourinary: Positive for flank pain.   Musculoskeletal: Positive for arthralgias.   Skin: Negative.    Neurological: Negative.    Psychiatric/Behavioral: Negative.        No past medical history on file.    Allergies   Allergen Reactions   • Aspirin GI Intolerance and Other (See Comments)     Nausea and vomiting          No past surgical history on file.    No family history on file.    Social History     Socioeconomic History   • Marital status:            Objective   Physical Exam  Vitals and nursing note reviewed.   Constitutional:       Appearance: She is well-developed.   HENT:      Head: Normocephalic and atraumatic.   Eyes:      Pupils: Pupils are equal, round, and reactive to light.   Cardiovascular:      Rate and Rhythm: Normal rate and regular rhythm.   Pulmonary:      Effort: Pulmonary effort is normal.      Breath sounds: Normal breath sounds.   Abdominal:      General: There is no  distension.      Palpations: Abdomen is soft.      Tenderness: There is no abdominal tenderness.   Musculoskeletal:         General: Normal range of motion.      Cervical back: Normal range of motion.   Skin:     General: Skin is warm and dry.      Comments: Bilateral lower extremities are cool to touch.  No obvious color difference in right lower leg versus left lower leg.  Pulses difficult to palpate   Neurological:      General: No focal deficit present.      Mental Status: She is alert and oriented to person, place, and time. Mental status is at baseline.   Psychiatric:         Mood and Affect: Mood normal.         Behavior: Behavior normal.         Thought Content: Thought content normal.         Judgment: Judgment normal.         Procedures           ED Course  ED Course as of 09/06/22 1751   Tue Sep 06, 2022   1354 Delay of imaging secondary to lack of IV [MG]   1653 Patient was started on Rocephin for possible UTI.  Call out to vascular surgery for recommendation on management most likely heparin or blood thinner. [MG]   1654 Patient was told she will be admitted to the hospitalist. [MG]   1720 Care assumed from YOBANI Coates pending vascular surgery call back and admission. [MD]   1750 Spoke with Dr. Sterling, on-call vascular surgeon, who would like patient started on heparin drip.  This was ordered.    Patient will be admitted to the hospital for further management.  She was discussed with the hospitalist nurse practitioner. [MD]      ED Course User Index  [MD] Jovanna Maldonado APRN  [MG] Zee Awan PA-C      Labs Reviewed   COMPREHENSIVE METABOLIC PANEL - Abnormal; Notable for the following components:       Result Value    Glucose 373 (*)     All other components within normal limits    Narrative:     GFR Normal >60  Chronic Kidney Disease <60  Kidney Failure <15     PROTIME-INR - Abnormal; Notable for the following components:    Protime 9.3 (*)     INR <0.93 (*)     All other components within normal  limits   APTT - Abnormal; Notable for the following components:    PTT 26.2 (*)     All other components within normal limits   URINALYSIS W/ MICROSCOPIC IF INDICATED (NO CULTURE) - Abnormal; Notable for the following components:    Appearance, UA Cloudy (*)     Glucose, UA >=1000 mg/dL (3+) (*)     Blood, UA Trace (*)     Protein, UA Trace (*)     Leuk Esterase, UA Moderate (2+) (*)     Nitrite, UA Positive (*)     All other components within normal limits   CBC WITH AUTO DIFFERENTIAL - Abnormal; Notable for the following components:    WBC 11.50 (*)     RBC 5.36 (*)     Lymphocytes, Absolute 4.00 (*)     All other components within normal limits   URINALYSIS, MICROSCOPIC ONLY - Abnormal; Notable for the following components:    WBC, UA Too Numerous to Count (*)     Bacteria, UA 1+ (*)     All other components within normal limits   BLOOD CULTURE   BLOOD CULTURE   CBC AND DIFFERENTIAL    Narrative:     The following orders were created for panel order CBC & Differential.  Procedure                               Abnormality         Status                     ---------                               -----------         ------                     CBC Auto Differential[[id number]]        Abnormal            Final result                 Please view results for these tests on the individual orders.   EXTRA TUBES    Narrative:     The following orders were created for panel order Extra Tubes.  Procedure                               Abnormality         Status                     ---------                               -----------         ------                     Gold Top - SST[[id number]]                                   Final result                 Please view results for these tests on the individual orders.   GOLD TOP - SST     CT Angio Abdominal Aorta Bilateral Iliofem Runoff    Result Date: 9/6/2022   1. Complete occlusion, previously stented right common iliac artery, age indeterminate, but potentially acute. 2.  Multifocal stenoses involving the right external iliac and common femoral arteries. 3. Single vessel infrapopliteal runoff on the right by the posterior tibial artery. Embolic occlusion of the of the vessels is not excluded. 4. Moderate grade stenosis, mid left external iliac artery. 5. Long segment occlusion, left superficial femoral artery, likely chronic. 6. Moderate grade proximal left renal arterial stenosis. 7. Hepatic steatosis. 8. Numerous additional findings, both vascular and nonvascular, as described above in greater detail.  Electronically Signed By-Nohemy Steve MD On:9/6/2022 4:30 PM This report was finalized on [id number] by  Nohemy Steve MD.    Medications   sodium chloride 0.9 % flush 10 mL (has no administration in time range)   cefTRIAXone (ROCEPHIN) 1 g in sodium chloride 0.9 % 100 mL IVPB (1 g Intravenous New Bag 9/6/22 4725)   heparin bolus from bag 6,400 Units (has no administration in time range)   heparin 90431 units/250 mL (100 units/mL) in 0.45 % NaCl infusion (has no administration in time range)   heparin bolus from bag 3,200 Units (has no administration in time range)   heparin bolus from bag 6,400 Units (has no administration in time range)   iopamidol (ISOVUE-370) 76 % injection 100 mL (100 mL Intravenous Given 9/6/22 1506)                                            MDM  Number of Diagnoses or Management Options  Diagnosis management comments: I examined the patient using the appropriate personal protective equipment.      DISPOSITION:   Chart Review:  Comorbidity:  has no past medical history on file.  Differentials:this list is not all inclusive and does not constitute the entirety of considered causes -->   ECG: interpreted by ER physician and reviewed by myself:   Labs:     Imaging: Was interpreted by physician and reviewed by myself:  CT Angio Abdominal Aorta Bilateral Iliofem Runoff    Result Date: 9/6/2022   1. Complete occlusion, previously stented right common iliac  artery, age indeterminate, but potentially acute. 2. Multifocal stenoses involving the right external iliac and common femoral arteries. 3. Single vessel infrapopliteal runoff on the right by the posterior tibial artery. Embolic occlusion of the of the vessels is not excluded. 4. Moderate grade stenosis, mid left external iliac artery. 5. Long segment occlusion, left superficial femoral artery, likely chronic. 6. Moderate grade proximal left renal arterial stenosis. 7. Hepatic steatosis. 8. Numerous additional findings, both vascular and nonvascular, as described above in greater detail.  Electronically Signed By-Nohemy Steve MD On:9/6/2022 4:30 PM This report was finalized on [id number] by  Nohemy Steve MD.      Disposition/Treatment:           Amount and/or Complexity of Data Reviewed  Clinical lab tests: reviewed        Final diagnoses:   Right leg pain   Occlusion of artery of leg (HCC)   Acute cystitis with hematuria       ED Disposition  ED Disposition     ED Disposition   Decision to Admit    Condition   --    Comment   Level of Care: Telemetry [5]   Admitting Physician: MIKI FLETCHER [[id number]]   Attending Physician: MIKI FLETCHER [[id number]]               No follow-up provider specified.       Medication List      No changes were made to your prescriptions during this visit.          Jovanna Maldonado, SOREN  09/06/22 0561

## 2022-09-06 NOTE — CONSULTS
Name: Blossom Martin ADMIT: 2022   : 1965  PCP: Collin Low APRN    MRN: [id number] LOS: 0 days   AGE/SEX: 57 y.o. female  ROOM: [facility]      Consults  CC: Right leg pain  Subjective     History of Present Illness  57 y.o. female with a history of prior iliac artery stents placed at [facility] more than a year ago.  Patient reports that about 5 days ago she developed right hip, thigh, and leg pain.  When it did not resolve she presented to the [facility] emergency department this morning for an evaluation.  CT angiogram shows right common iliac artery stent occlusion.  For this reason we were asked to see her.  The patient has a burning/hot poker sensation in her leg when she ambulates.  She denies pain at rest.  She is a diabetic.    HPI Elements:  Location: Right common iliac  Severity: Severe  Duration: 5 days  Context: Prior iliac artery stents  Modifying Factors: Patient was on Plavix  Associated Signs and Symptoms: Pain with very short distance ambulation.  Denies pain at rest right now.    Review of Systems   Constitutional: Negative for diaphoresis.   Respiratory: Negative for chest tightness and wheezing.    Cardiovascular: Negative for palpitations and leg swelling.   Musculoskeletal: Positive for gait problem.   Skin: Positive for color change.   Neurological: Positive for numbness.   Hematological: Bruises/bleeds easily.   All other systems reviewed and are negative.      History Review Reviewed Comments   Past Medical History:  [x]   Diabetes with neuropathy, peripheral vascular disease,   Past Surgical History: [x]   Hysterectomy, bilateral iliac artery stents at least 1 year ago   Family History: [x]   Denies family history of clotting disorder   Social History: [x]   Patient denies cigarette use     Allergies: Aspirin      Objective   Temp:  [97.6 °F (36.4 °C)] 97.6 °F (36.4 °C)  Heart Rate:  [68-88] 73  Resp:  [18-20] 18  BP: (120-142)/(64-80)  136/80    No intake/output data recorded.    Physical Exam  Constitutional:       Appearance: She is obese.   HENT:      Head: Normocephalic and atraumatic.      Right Ear: External ear normal.      Left Ear: External ear normal.      Mouth/Throat:      Mouth: Mucous membranes are moist.   Eyes:      Conjunctiva/sclera: Conjunctivae normal.      Pupils: Pupils are equal, round, and reactive to light.   Neck:      Vascular: No carotid bruit.   Cardiovascular:      Rate and Rhythm: Normal rate and regular rhythm.      Pulses:           Carotid pulses are 2+ on the right side and 2+ on the left side.       Radial pulses are 2+ on the right side and 2+ on the left side.        Femoral pulses are 0 on the right side and 2+ on the left side.     Comments: Bilateral lower extremities are cool to the touch.  Actually fairly symmetric.  The right foot is more ruborous than the left.  Pedal pulses are nonpalpable bilaterally.  She has a weak monophasic anterior tibial signal on the right.  Sensation is intact.  Motor function is intact.  Pulmonary:      Effort: Pulmonary effort is normal.      Breath sounds: Normal breath sounds.   Abdominal:      Palpations: Abdomen is soft.      Comments: Low transverse incision.  No midline incisions.   Musculoskeletal:         General: Normal range of motion.      Cervical back: No tenderness.   Neurological:      General: No focal deficit present.      Mental Status: She is oriented to person, place, and time.   Psychiatric:         Mood and Affect: Mood normal.       Data Reviewed:  CBC    Results from last 7 days   Lab Units 09/06/22  1239   WBC 10*3/mm3 11.50*   HEMOGLOBIN g/dL 15.4   PLATELETS 10*3/mm3 306     BMP   Results from last 7 days   Lab Units 09/06/22  1239   SODIUM mmol/L 137   POTASSIUM mmol/L 4.1   CHLORIDE mmol/L 98   CO2 mmol/L 25.0   BUN mg/dL 11   CREATININE mg/dL 0.71   GLUCOSE mg/dL 373*     Coag   Results from last 7 days   Lab Units 09/06/22  1239   INR  <0.93*    APTT seconds 26.2*     Radiology(recent) CT Angio Abdominal Aorta Bilateral Iliofem Runoff    Result Date: 9/6/2022   1. Complete occlusion, previously stented right common iliac artery, age indeterminate, but potentially acute. 2. Multifocal stenoses involving the right external iliac and common femoral arteries. 3. Single vessel infrapopliteal runoff on the right by the posterior tibial artery. Embolic occlusion of the of the vessels is not excluded. 4. Moderate grade stenosis, mid left external iliac artery. 5. Long segment occlusion, left superficial femoral artery, likely chronic. 6. Moderate grade proximal left renal arterial stenosis. 7. Hepatic steatosis. 8. Numerous additional findings, both vascular and nonvascular, as described above in greater detail.  Electronically Signed By-Nohemy Steve MD On:9/6/2022 4:30 PM This report was finalized on [id number] by  Nohemy Steve MD.      Labs significant for: White blood cell count of 11.    Imaging Studies:  Right common iliac artery stent is occluded.  Distal aortic disease is significant.      Active Hospital Problems    Diagnosis  POA   • **Arterial stent thrombosis, initial encounter ([facility]) [T82.313B]  Yes      Resolved Hospital Problems   No resolved problems to display.       Data Points:  During this visit the following were done:  Labs Reviewed [x]    Labs Ordered []    Radiology Reports Reviewed [x]    Radiology Ordered [x]    EKG, echo, and/or stress test reviewed []    Vascular lab results reviewed  []    Vascular lab images reviewed and interpreted per myself   []    Referring Provider Records Reviewed []    ER Records Reviewed []    Hospital Records Reviewed/Summarized [x]    History Obtained From Family []    Radiological images view and Interpreted per myself [x]    Case Discussed with referring provider []     Decision to obtain and request outside records  [x]  Renny  Total data points:4 or more    Active Hospital Problems:   Active  Hospital Problems    Diagnosis  POA   • **Arterial stent thrombosis, initial encounter ([facility]) [T72.443F]  Yes      Resolved Hospital Problems   No resolved problems to display.      Assessment & Plan   [place]illin  Assessment / Plan        57 y.o. female whom we were asked to see in the emergency department because of right leg pain and a CT angiogram showing a right common iliac artery stent occlusion.  The patient's medical records were reviewed.  We do not have actual records from [facility] but patient reports that she had iliac artery stents there placed about 1 year ago.  She says it could be more.  She is maintained on Plavix.  She is not on anticoagulation.  She does not recall the name of her surgeon or interventional list.  She is a non-smoker.  About 5 days ago she began to have some right sided hip and leg pain.  When this did not progress she elected to come into the emergency department this morning.  She complains of a burning and aching sensation with very short distance ambulation even across the room in the right lower extremity.  At rest this basically resolves.  On exam, she is obese.  She has a low transverse incision of the abdomen from a prior  but no abdominal incisions.  The right femoral pulses nonpalpable.  Left femoral pulses palpable.  She has nonpalpable pedal pulses bilaterally.  The right foot is more ruborous than the left.  Both are relatively cool to the touch and fairly symmetric.  Motor and neurologic sensation are normal bilaterally.    CT angiogram has been reviewed.  Patient has probably at least 4 iliac stents in place.  2 in the common iliac on the right and a right external iliac artery stents.  Probably at least 1 stent in the left common iliac artery.  The distal aorta is diseased.  The tops of the stents are not very well matched on the right common iliac artery stents are occluded.  There was a gap over the internal iliac work that is unstented.   Flow was seen distally in the right external iliac and common femoral arteries below this.  The right superficial femoral artery is patent as is the deep femoral artery.  Reasonable runoff is seen on the right.  The left SFA has a long segment chronic appearing occlusion.    The patient is being admitted to the hospital.  Agree with anticoagulation.  I do not think she needs emergency revascularization tonight as motor and neuro is intact but she does have a threatened limb without intervention.  We will plan to make her n.p.o. after midnight, review images, and develop a surgical plan.  Clearly include thrombolysis and stenting versus primary stenting.    I discussed the patients findings and my recommendations with patient and nursing staff.  Please call my office with any question: [phone]

## 2022-09-07 LAB
ABO GROUP BLD: NORMAL
APTT PPP: 59.2 SECONDS (ref 61–76.5)
APTT PPP: 63.1 SECONDS (ref 61–76.5)
APTT PPP: 86.8 SECONDS (ref 61–76.5)
BLD GP AB SCN SERPL QL: NEGATIVE
FIBRINOGEN PPP-MCNC: 403 MG/DL (ref 210–450)
GLUCOSE BLDC GLUCOMTR-MCNC: 321 MG/DL (ref 70–105)
HOLD SPECIMEN: NORMAL
RH BLD: NEGATIVE
T&S EXPIRATION DATE: NORMAL

## 2022-09-07 PROCEDURE — 86850 RBC ANTIBODY SCREEN: CPT | Performed by: SURGERY

## 2022-09-07 PROCEDURE — 36415 COLL VENOUS BLD VENIPUNCTURE: CPT | Performed by: SURGERY

## 2022-09-07 PROCEDURE — 86900 BLOOD TYPING SEROLOGIC ABO: CPT

## 2022-09-07 PROCEDURE — 86901 BLOOD TYPING SEROLOGIC RH(D): CPT | Performed by: SURGERY

## 2022-09-07 PROCEDURE — 63710000001 INSULIN GLARGINE PER 5 UNITS: Performed by: INTERNAL MEDICINE

## 2022-09-07 PROCEDURE — 83036 HEMOGLOBIN GLYCOSYLATED A1C: CPT | Performed by: INTERNAL MEDICINE

## 2022-09-07 PROCEDURE — 25010000002 HEPARIN (PORCINE) 25000-0.45 UT/250ML-% SOLUTION: Performed by: NURSE PRACTITIONER

## 2022-09-07 PROCEDURE — 86901 BLOOD TYPING SEROLOGIC RH(D): CPT

## 2022-09-07 PROCEDURE — 99233 SBSQ HOSP IP/OBS HIGH 50: CPT | Performed by: INTERNAL MEDICINE

## 2022-09-07 PROCEDURE — 85384 FIBRINOGEN ACTIVITY: CPT | Performed by: STUDENT IN AN ORGANIZED HEALTH CARE EDUCATION/TRAINING PROGRAM

## 2022-09-07 PROCEDURE — 85730 THROMBOPLASTIN TIME PARTIAL: CPT | Performed by: INTERNAL MEDICINE

## 2022-09-07 PROCEDURE — 86900 BLOOD TYPING SEROLOGIC ABO: CPT | Performed by: SURGERY

## 2022-09-07 PROCEDURE — 82962 GLUCOSE BLOOD TEST: CPT

## 2022-09-07 PROCEDURE — 63710000001 INSULIN LISPRO (HUMAN) PER 5 UNITS: Performed by: INTERNAL MEDICINE

## 2022-09-07 PROCEDURE — 85025 COMPLETE CBC W/AUTO DIFF WBC: CPT | Performed by: NURSE PRACTITIONER

## 2022-09-07 RX ORDER — OLANZAPINE 10 MG/2ML
1 INJECTION, POWDER, LYOPHILIZED, FOR SOLUTION INTRAMUSCULAR
Status: DISCONTINUED | OUTPATIENT
Start: 2022-09-07 | End: 2022-09-12 | Stop reason: HOSPADM

## 2022-09-07 RX ORDER — INSULIN LISPRO 100 [IU]/ML
0-14 INJECTION, SOLUTION INTRAVENOUS; SUBCUTANEOUS
Status: DISCONTINUED | OUTPATIENT
Start: 2022-09-07 | End: 2022-09-09

## 2022-09-07 RX ORDER — DEXTROSE MONOHYDRATE 25 G/50ML
25 INJECTION, SOLUTION INTRAVENOUS
Status: DISCONTINUED | OUTPATIENT
Start: 2022-09-07 | End: 2022-09-12 | Stop reason: HOSPADM

## 2022-09-07 RX ORDER — NICOTINE POLACRILEX 4 MG
15 LOZENGE BUCCAL
Status: DISCONTINUED | OUTPATIENT
Start: 2022-09-07 | End: 2022-09-12 | Stop reason: HOSPADM

## 2022-09-07 RX ADMIN — DULOXETINE HYDROCHLORIDE 40 MG: 20 CAPSULE, DELAYED RELEASE ORAL at 09:55

## 2022-09-07 RX ADMIN — BUPROPION HYDROCHLORIDE 450 MG: 150 TABLET, EXTENDED RELEASE ORAL at 09:55

## 2022-09-07 RX ADMIN — HEPARIN SODIUM 18 UNITS/KG/HR: 10000 INJECTION, SOLUTION INTRAVENOUS at 21:08

## 2022-09-07 RX ADMIN — INSULIN GLARGINE 20 UNITS: 100 INJECTION, SOLUTION SUBCUTANEOUS at 21:10

## 2022-09-07 RX ADMIN — HEPARIN SODIUM 18 UNITS/KG/HR: 10000 INJECTION, SOLUTION INTRAVENOUS at 06:34

## 2022-09-07 RX ADMIN — HYDROCODONE BITARTRATE AND ACETAMINOPHEN 1 TABLET: 7.5; 325 TABLET ORAL at 21:10

## 2022-09-07 RX ADMIN — INSULIN LISPRO 10 UNITS: 100 INJECTION, SOLUTION INTRAVENOUS; SUBCUTANEOUS at 21:10

## 2022-09-07 RX ADMIN — HYDROCODONE BITARTRATE AND ACETAMINOPHEN 1 TABLET: 7.5; 325 TABLET ORAL at 14:42

## 2022-09-07 RX ADMIN — METOPROLOL TARTRATE 100 MG: 50 TABLET, FILM COATED ORAL at 21:10

## 2022-09-07 RX ADMIN — LINAGLIPTIN 5 MG: 5 TABLET, FILM COATED ORAL at 14:41

## 2022-09-07 RX ADMIN — CLOPIDOGREL BISULFATE 75 MG: 75 TABLET ORAL at 14:41

## 2022-09-07 RX ADMIN — HYDROXYZINE HYDROCHLORIDE 100 MG: 25 TABLET, FILM COATED ORAL at 21:09

## 2022-09-07 NOTE — CASE MANAGEMENT/SOCIAL WORK
Social Work Assessment  [facility]     Patient Name: Blossom Martin  MRN: [id number]  Today's Date: 9/7/2022    Admit Date: 9/6/2022     Discharge Needs Assessment     Row Name 09/07/22 6619       Discharge Needs Assessment    Concerns to be Addressed coping/stress    Concerns Comments SW was consulted 2/2 multiple life stressors. SW met with pt in room 310 to inquire further. Pt lying in bed, no family present. SW introduced self and reason for consult. Pt stated, “it’s just life,” and explained she  from her  over a year ago and he just recently signed the divorce papers, she moved in with her dtr and their family, her grandson was diagnosed with RSV (is not admitted - he’s able to be home on breathing txs), and she is the  for the 2 grandbabies (ages 1 and 3 y.o.), but she is stuck in the hospital with expected surgery on Friday. She says her AVIVA is on a fishing trip in [place] until Friday, but he sent his mother to the home to help pt’s dtr with the children, and he’s likely coming home early. Pt reports seeing a psychiatrist (Dr. Cross?) and says the medications she is on are helpful. Although there are no practical resources to help with the stressors themselves, JENNIFER offered pt a list of counseling resources to help with processing it all. Pt accepted the list and reports being interested in seeking counseling through . She thanked this writer and denies further needs at this time.              Met with patient in room wearing PPE: mask.      Maintained distance greater than six feet and spent less than 15 minutes in the room.      Anna Unger, RUPINDER, [facility] [place]  Medical Social Worker  Ph [phone]  Fax [phone]  [email or web address]

## 2022-09-07 NOTE — PLAN OF CARE
Goal Outcome Evaluation:              Outcome Evaluation: Patient currently resting abed, no distress noted. Patient is NPO and has vascular surgery on board for possible surgery today. Will continue to monitor.

## 2022-09-07 NOTE — PROGRESS NOTES
"Name: Blossom Martin ADMIT: 2022   : 1965  PCP: Collin Low APRN    MRN: [id number] LOS: 1 days   AGE/SEX: 57 y.o. female  ROOM: [address] [place] [address]    [place], Subsequent Hospital Care    57 y.o. female with right common iliac artery stent occlusion and acute limb ischemia of the right leg    Still having short distance claudication but sensory and motor intact to right leg.  She has gotten her outside physician's information and I uploaded this information under the media tab.      ROS: negative except pain in right leg with ambulation    Scheduled Medications:   buPROPion XL, 450 mg, Oral, QAM  clopidogrel, 75 mg, Oral, Daily  DULoxetine, 40 mg, Oral, Daily  hydrOXYzine, 100 mg, Oral, Nightly  isosorbide mononitrate, 60 mg, Oral, Daily  linagliptin, 5 mg, Oral, Daily  metoprolol tartrate, 100 mg, Oral, BID      Active Infusions:  heparin, 18 Units/kg/hr, Last Rate: 20 Units/kg/hr (22 0940)      Vital Signs  Vital Signs Patient Vitals for the past 24 hrs:   BP Temp Temp src Pulse Resp SpO2 Height Weight   22 1236 148/75 98 °F (36.7 °C) Oral 59 -- 97 % -- --   22 0700 154/66 97.5 °F (36.4 °C) Oral 59 20 99 % -- --   22 0354 114/62 97.8 °F (36.6 °C) Oral 59 18 96 % -- --   22 2140 155/75 98.2 °F (36.8 °C) Oral 78 18 98 % 160 cm (63\") 81.2 kg (179 lb)   22 161/82 -- -- 88 20 99 % -- --   22 1914 123/98 -- -- 68 20 98 % -- --   22 -- -- -- 77 -- 100 % -- --   22 -- -- -- 75 -- 100 % -- --   22 -- -- -- 76 -- 100 % -- --   22 -- -- -- 74 -- 97 % -- --   22 -- -- -- 71 -- 97 % -- --   22 -- -- -- 75 -- 98 % -- --   22 -- -- -- 74 -- 99 % -- --   22 129/93 -- -- 76 -- 99 % -- --     I/O:  No intake/output data recorded.  Physical Exam:    Physical Exam  Vitals reviewed.   Constitutional:       General: She is not in acute distress.     Appearance: Normal " appearance.   HENT:      Head: Normocephalic and atraumatic.      Right Ear: External ear normal.      Left Ear: External ear normal.      Nose: Nose normal.      Mouth/Throat:      Mouth: Mucous membranes are moist.      Pharynx: Oropharynx is clear.   Eyes:      Extraocular Movements: Extraocular movements intact.      Conjunctiva/sclera: Conjunctivae normal.      Pupils: Pupils are equal, round, and reactive to light.   Cardiovascular:      Rate and Rhythm: Normal rate and regular rhythm.   Pulmonary:      Effort: Pulmonary effort is normal. No respiratory distress.   Abdominal:      General: Abdomen is flat.      Palpations: Abdomen is soft.   Musculoskeletal:         General: No swelling or deformity.      Cervical back: Normal range of motion and neck supple.   Skin:     General: Skin is dry.      Capillary Refill: Capillary refill takes less than 2 seconds.   Neurological:      General: No focal deficit present.      Mental Status: She is alert and oriented to person, place, and time.      Comments: 5/5 strength in bilateral legs  Intact to LT in bilateral legs   Psychiatric:         Mood and Affect: Mood normal.         Behavior: Behavior normal.          CBC    Results from last 7 days   Lab Units 09/06/22  1239   WBC 10*3/mm3 11.50*   HEMOGLOBIN g/dL 15.4   PLATELETS 10*3/mm3 306     BMP   Results from last 7 days   Lab Units 09/06/22  1239   SODIUM mmol/L 137   POTASSIUM mmol/L 4.1   CHLORIDE mmol/L 98   CO2 mmol/L 25.0   BUN mg/dL 11   CREATININE mg/dL 0.71   GLUCOSE mg/dL 373*     Coag   Results from last 7 days   Lab Units 09/07/22  0846 09/07/22  0024 09/06/22  1239   INR   --   --  <0.93*   APTT seconds 59.2* 63.1 26.2*     Assessment & Plan   Assessment & Plan    Arterial stent thrombosis, initial encounter ([facility])    Right leg pain    57 y.o. female with thrombosis of right common iliac artery stent and acute limb ischemia of the right lower extremity    -I asked more about her history and she has no  contraindications to thrombolysis.  We will plan for right leg thrombolysis in the cath lab tomorrow at 0800.  We discussed the risks, benefits, and alternatives, including but not limited to bleeding.  She expressed understanding and agreed to proceed.     -we also discussed intended return to cath lab the follow day for stent placement, bilateral.      -continue heparin and plavix    -pre op orders placed    Personal protective equipment used for this patient encounter:  Patient wearing surgical mask []    Provider wearing a surgical mask [x]    Gloves []    Eye protection []    Face Shield []    Gown []    N 95 respirator or CAPR/PAPR []   Duration of interaction 20 mins    Shirley Kurtz MD  Vascular Surgery  [facility]  O: [phone]  F: [phone]      Please call my office with any question: [phone]    Active Hospital Problems    Diagnosis  POA   • **Arterial stent thrombosis, initial encounter ([facility]) [T82.868A]  Yes   • Right leg pain [M79.604]  Yes      Resolved Hospital Problems   No resolved problems to display.

## 2022-09-07 NOTE — CONSULTS
"Diabetes Education  Assessment/Teaching    Patient Name:  Blossom Martin  YOB: 1965  MRN: [id number]  Admit Date:  9/6/2022      Assessment Date:  9/7/2022  Flowsheet Row Most Recent Value   General Information     Referral From: MD order  [MD consult to blood glucose >200.]   Height 160 cm (63\")   Height Method Stated   Weight 81.2 kg (179 lb)   Weight Method Bed scale   Pregnancy Assessment    Diabetes History    What type of diabetes do you have? Type 2   Current DM knowledge fair   Do you test your blood sugar at home? yes   Frequency of checks once a week   Meter type unsure of name but about 5-6 years old   Who performs the test? patient   Typical readings <200   Education Preferences    What areas of diabetes would you like to learn about? avoiding high blood sugar, medications for diabetes, testing my blood sugar at home   Nutrition Information    Assessment Topics    Taking Medication - Assessment Needs education   Problem Solving - Assessment Needs education   Monitoring - Assessment Needs education   DM Goals    Taking Medication - Goal Today   Problem Solving - Goal Today   Monitoring - Goal Today          Flowsheet Row Most Recent Value   DM Education Needs    Meter Needs meter   Meter Type Accuchek   Frequency of Testing AC meals   Medication Oral  [At home patient stated that she takes Glimepiride 2 mg daily and Januvia 100 mg daily.]   Problem Solving Hyperglycemia, Signs, Symptoms, Treatment   Discharge Plan Home   Motivation Moderate   Teaching Method Discussion   Patient Response Verbalized understanding            Other Comments:  Patient stated she was taking Levemir 75 units daily until insurance stopped covering on January 1, 2022. Called transition of care team to find out what insulin would be covered for patient. Medicaid did not cover the Levemir but with patient being on Medicare insulin is covered with $4 co-pay. Reviewed the steps to insulin pen administration and " patient stated she knew about priming the pen and holding the pen for 10 seconds after administration.  Prescriptions started in discharge orders for Levemir flex pen, pen needles, Accu-chek Guide Me glucometer,  lancets, test strips, and alcohol wipes. Patient has no further questions or concerns related to diabetes at this time.          Electronically signed by:  Zee Maloney RN  09/07/22 18:13 EDT

## 2022-09-07 NOTE — PLAN OF CARE
Problem: Adult Inpatient Plan of Care  Goal: Plan of Care Review  9/7/2022 1607 by Loree Garcia RN  Outcome: Ongoing, Progressing  9/7/2022 1607 by Loree Garcia RN  Outcome: Ongoing, Progressing  Goal: Patient-Specific Goal (Individualized)  9/7/2022 1607 by Loree Garcia RN  Outcome: Ongoing, Progressing  9/7/2022 1607 by Loree Garcia RN  Outcome: Ongoing, Progressing  Goal: Absence of Hospital-Acquired Illness or Injury  9/7/2022 1607 by Loree Garcia RN  Outcome: Ongoing, Progressing  9/7/2022 1607 by Loree Garcia RN  Outcome: Ongoing, Progressing  Intervention: Identify and Manage Fall Risk  Recent Flowsheet Documentation  Taken 9/7/2022 1601 by Loree Garcia RN  Safety Promotion/Fall Prevention: safety round/check completed  Taken 9/7/2022 1406 by Loree Garcia RN  Safety Promotion/Fall Prevention: safety round/check completed  Taken 9/7/2022 1220 by Loree Garcia RN  Safety Promotion/Fall Prevention: safety round/check completed  Taken 9/7/2022 1005 by Loree Garcia RN  Safety Promotion/Fall Prevention: safety round/check completed  Taken 9/7/2022 0855 by Loree Garcia RN  Safety Promotion/Fall Prevention: safety round/check completed  Intervention: Prevent Skin Injury  Recent Flowsheet Documentation  Taken 9/7/2022 0855 by Loree Garcia RN  Skin Protection: adhesive use limited  Intervention: Prevent and Manage VTE (Venous Thromboembolism) Risk  Recent Flowsheet Documentation  Taken 9/7/2022 0855 by Loree Garcia RN  VTE Prevention/Management:   compression stockings off   sequential compression devices off  Range of Motion: active ROM (range of motion) encouraged  Intervention: Prevent Infection  Recent Flowsheet Documentation  Taken 9/7/2022 0855 by Loree Garcia RN  Infection Prevention:   hand hygiene promoted   equipment surfaces disinfected  Goal: Optimal Comfort and Wellbeing  9/7/2022 1607 by Loree Garcia RN  Outcome:  Ongoing, Progressing  9/7/2022 1607 by Loree Garcia RN  Outcome: Ongoing, Progressing  Intervention: Monitor Pain and Promote Comfort  Recent Flowsheet Documentation  Taken 9/7/2022 0855 by Loree Garcia RN  Pain Management Interventions: position adjusted  Intervention: Provide Person-Centered Care  Recent Flowsheet Documentation  Taken 9/7/2022 0855 by Loree Garcia RN  Trust Relationship/Rapport:   care explained   choices provided   questions answered   questions encouraged   thoughts/feelings acknowledged  Goal: Readiness for Transition of Care  9/7/2022 1607 by Loree Garcia RN  Outcome: Ongoing, Progressing  9/7/2022 1607 by Loree Garcia RN  Outcome: Ongoing, Progressing     Problem: VTE (Venous Thromboembolism)  Goal: VTE (Venous Thromboembolism) Symptom Resolution  9/7/2022 1607 by Loree Garcia RN  Outcome: Ongoing, Progressing  9/7/2022 1607 by Loree Garcia RN  Outcome: Ongoing, Progressing  Intervention: Prevent or Manage VTE (Venous Thromboembolism)  Recent Flowsheet Documentation  Taken 9/7/2022 0855 by Loree Garcia RN  VTE Prevention/Management:   compression stockings off   sequential compression devices off   Goal Outcome Evaluation:                 Patient continues on a heparin gtt. Patient will be NPO at midnight for a lysis catheter placement, and then be transferred to ICU. Will continue to observe.

## 2022-09-07 NOTE — CASE MANAGEMENT/SOCIAL WORK
Continued Stay Note  TOMASZ Penaloza     Patient Name: Blossom Martin  MRN: [id number]  Today's Date: 9/7/2022    Admit Date: 9/6/2022     Discharge Plan     Row Name 09/07/22 1609       Plan    Plan D/C plan: Anticipate home with family    Patient/Family in Agreement with Plan yes    Plan Comments CM contacted JENNIFER Castillo, aware of case. CM met with pt to deliver IMM. Barrier to d/c: procedures planned for today and tomorrow, Hep gtt                   Expected Discharge Date and Time     Expected Discharge Date Expected Discharge Time    Sep 10, 2022       Met with patient in room wearing PPE: mask  Maintained distance greater than six feet and spent less than 15 minutes in the room.            ABBEY DE LEON RN

## 2022-09-08 ENCOUNTER — APPOINTMENT (OUTPATIENT)
Dept: GENERAL RADIOLOGY | Facility: HOSPITAL | Age: 57
End: 2022-09-08

## 2022-09-08 ENCOUNTER — ANESTHESIA EVENT (OUTPATIENT)
Dept: PERIOP | Facility: HOSPITAL | Age: 57
End: 2022-09-08

## 2022-09-08 ENCOUNTER — APPOINTMENT (OUTPATIENT)
Dept: CARDIOLOGY | Facility: HOSPITAL | Age: 57
End: 2022-09-08

## 2022-09-08 PROBLEM — E78.5 HYPERLIPIDEMIA: Status: ACTIVE | Noted: 2022-09-08

## 2022-09-08 PROBLEM — E11.9 DIABETES MELLITUS: Status: ACTIVE | Noted: 2019-02-28

## 2022-09-08 PROBLEM — Z72.0 TOBACCO ABUSE: Status: ACTIVE | Noted: 2020-07-29

## 2022-09-08 LAB
ANION GAP SERPL CALCULATED.3IONS-SCNC: 10 MMOL/L (ref 5–15)
APTT PPP: 60.4 SECONDS (ref 61–76.5)
BASOPHILS # BLD AUTO: 0.1 10*3/MM3 (ref 0–0.2)
BASOPHILS # BLD AUTO: 0.1 10*3/MM3 (ref 0–0.2)
BASOPHILS NFR BLD AUTO: 0.4 % (ref 0–1.5)
BASOPHILS NFR BLD AUTO: 0.8 % (ref 0–1.5)
BH CV LEFT GROIN PSA PROCEDURE SCRIPTING LRR: 1
BH CV VAS L EIA VELOCITY PSV: 178 CM/SEC
BUN SERPL-MCNC: 12 MG/DL (ref 6–20)
BUN/CREAT SERPL: 15 (ref 7–25)
CALCIUM SPEC-SCNC: 8.8 MG/DL (ref 8.6–10.5)
CHLORIDE SERPL-SCNC: 102 MMOL/L (ref 98–107)
CO2 SERPL-SCNC: 24 MMOL/L (ref 22–29)
CREAT SERPL-MCNC: 0.8 MG/DL (ref 0.57–1)
DEPRECATED RDW RBC AUTO: 43.3 FL (ref 37–54)
DEPRECATED RDW RBC AUTO: 43.3 FL (ref 37–54)
EGFRCR SERPLBLD CKD-EPI 2021: 85.5 ML/MIN/1.73
EOSINOPHIL # BLD AUTO: 0.2 10*3/MM3 (ref 0–0.4)
EOSINOPHIL # BLD AUTO: 0.2 10*3/MM3 (ref 0–0.4)
EOSINOPHIL NFR BLD AUTO: 1.6 % (ref 0.3–6.2)
EOSINOPHIL NFR BLD AUTO: 1.8 % (ref 0.3–6.2)
ERYTHROCYTE [DISTWIDTH] IN BLOOD BY AUTOMATED COUNT: 14.2 % (ref 12.3–15.4)
ERYTHROCYTE [DISTWIDTH] IN BLOOD BY AUTOMATED COUNT: 14.2 % (ref 12.3–15.4)
FIBRINOGEN PPP-MCNC: 434 MG/DL (ref 210–450)
GLUCOSE BLDC GLUCOMTR-MCNC: 170 MG/DL (ref 70–105)
GLUCOSE BLDC GLUCOMTR-MCNC: 232 MG/DL (ref 70–105)
GLUCOSE BLDC GLUCOMTR-MCNC: 236 MG/DL (ref 70–105)
GLUCOSE BLDC GLUCOMTR-MCNC: 279 MG/DL (ref 70–105)
GLUCOSE BLDC GLUCOMTR-MCNC: 284 MG/DL (ref 70–105)
GLUCOSE SERPL-MCNC: 328 MG/DL (ref 65–99)
HBA1C MFR BLD: 10.2 % (ref 3.5–5.6)
HCT VFR BLD AUTO: 42.5 % (ref 34–46.6)
HCT VFR BLD AUTO: 46.7 % (ref 34–46.6)
HGB BLD-MCNC: 13.9 G/DL (ref 12–15.9)
HGB BLD-MCNC: 15.4 G/DL (ref 12–15.9)
INR PPP: 1 (ref 0.93–1.1)
LEFT GROIN CFA SYS: 130 CM/SEC
LYMPHOCYTES # BLD AUTO: 3.7 10*3/MM3 (ref 0.7–3.1)
LYMPHOCYTES # BLD AUTO: 5.7 10*3/MM3 (ref 0.7–3.1)
LYMPHOCYTES NFR BLD AUTO: 24.9 % (ref 19.6–45.3)
LYMPHOCYTES NFR BLD AUTO: 45.8 % (ref 19.6–45.3)
MAXIMAL PREDICTED HEART RATE: 162 BPM
MCH RBC QN AUTO: 28.6 PG (ref 26.6–33)
MCH RBC QN AUTO: 28.7 PG (ref 26.6–33)
MCHC RBC AUTO-ENTMCNC: 32.8 G/DL (ref 31.5–35.7)
MCHC RBC AUTO-ENTMCNC: 32.9 G/DL (ref 31.5–35.7)
MCV RBC AUTO: 87.3 FL (ref 79–97)
MCV RBC AUTO: 87.4 FL (ref 79–97)
MONOCYTES # BLD AUTO: 0.7 10*3/MM3 (ref 0.1–0.9)
MONOCYTES # BLD AUTO: 0.9 10*3/MM3 (ref 0.1–0.9)
MONOCYTES NFR BLD AUTO: 5.4 % (ref 5–12)
MONOCYTES NFR BLD AUTO: 5.9 % (ref 5–12)
NEUTROPHILS NFR BLD AUTO: 10 10*3/MM3 (ref 1.7–7)
NEUTROPHILS NFR BLD AUTO: 46.2 % (ref 42.7–76)
NEUTROPHILS NFR BLD AUTO: 5.8 10*3/MM3 (ref 1.7–7)
NEUTROPHILS NFR BLD AUTO: 67.2 % (ref 42.7–76)
NRBC BLD AUTO-RTO: 0.1 /100 WBC (ref 0–0.2)
NRBC BLD AUTO-RTO: 0.1 /100 WBC (ref 0–0.2)
PLATELET # BLD AUTO: 283 10*3/MM3 (ref 140–450)
PLATELET # BLD AUTO: 317 10*3/MM3 (ref 140–450)
PMV BLD AUTO: 8.6 FL (ref 6–12)
PMV BLD AUTO: 8.8 FL (ref 6–12)
POTASSIUM SERPL-SCNC: 3.8 MMOL/L (ref 3.5–5.2)
PROTHROMBIN TIME: 10.3 SECONDS (ref 9.6–11.7)
PROX PFA PSV LEFT: 107 CM/SEC
PROX SFA PSV LEFT: 0 CM/SEC
RBC # BLD AUTO: 4.86 10*6/MM3 (ref 3.77–5.28)
RBC # BLD AUTO: 5.35 10*6/MM3 (ref 3.77–5.28)
SODIUM SERPL-SCNC: 136 MMOL/L (ref 136–145)
STRESS TARGET HR: 138 BPM
WBC NRBC COR # BLD: 12.5 10*3/MM3 (ref 3.4–10.8)
WBC NRBC COR # BLD: 14.9 10*3/MM3 (ref 3.4–10.8)
WHOLE BLOOD HOLD COAG: NORMAL
WHOLE BLOOD HOLD SPECIMEN: NORMAL

## 2022-09-08 PROCEDURE — B41D1ZZ FLUOROSCOPY OF AORTA AND BILATERAL LOWER EXTREMITY ARTERIES USING LOW OSMOLAR CONTRAST: ICD-10-PCS | Performed by: STUDENT IN AN ORGANIZED HEALTH CARE EDUCATION/TRAINING PROGRAM

## 2022-09-08 PROCEDURE — 71045 X-RAY EXAM CHEST 1 VIEW: CPT

## 2022-09-08 PROCEDURE — 25010000002 HEPARIN (PORCINE) 25000-0.45 UT/250ML-% SOLUTION: Performed by: NURSE PRACTITIONER

## 2022-09-08 PROCEDURE — 36556 INSERT NON-TUNNEL CV CATH: CPT | Performed by: STUDENT IN AN ORGANIZED HEALTH CARE EDUCATION/TRAINING PROGRAM

## 2022-09-08 PROCEDURE — C1887 CATHETER, GUIDING: HCPCS | Performed by: STUDENT IN AN ORGANIZED HEALTH CARE EDUCATION/TRAINING PROGRAM

## 2022-09-08 PROCEDURE — 02H633Z INSERTION OF INFUSION DEVICE INTO RIGHT ATRIUM, PERCUTANEOUS APPROACH: ICD-10-PCS | Performed by: STUDENT IN AN ORGANIZED HEALTH CARE EDUCATION/TRAINING PROGRAM

## 2022-09-08 PROCEDURE — C1894 INTRO/SHEATH, NON-LASER: HCPCS | Performed by: STUDENT IN AN ORGANIZED HEALTH CARE EDUCATION/TRAINING PROGRAM

## 2022-09-08 PROCEDURE — 25010000002 FENTANYL CITRATE (PF) 100 MCG/2ML SOLUTION: Performed by: STUDENT IN AN ORGANIZED HEALTH CARE EDUCATION/TRAINING PROGRAM

## 2022-09-08 PROCEDURE — 63710000001 INSULIN LISPRO (HUMAN) PER 5 UNITS: Performed by: INTERNAL MEDICINE

## 2022-09-08 PROCEDURE — B548ZZA ULTRASONOGRAPHY OF SUPERIOR VENA CAVA, GUIDANCE: ICD-10-PCS | Performed by: STUDENT IN AN ORGANIZED HEALTH CARE EDUCATION/TRAINING PROGRAM

## 2022-09-08 PROCEDURE — 85384 FIBRINOGEN ACTIVITY: CPT | Performed by: STUDENT IN AN ORGANIZED HEALTH CARE EDUCATION/TRAINING PROGRAM

## 2022-09-08 PROCEDURE — C1751 CATH, INF, PER/CENT/MIDLINE: HCPCS | Performed by: STUDENT IN AN ORGANIZED HEALTH CARE EDUCATION/TRAINING PROGRAM

## 2022-09-08 PROCEDURE — 63710000001 INSULIN LISPRO (HUMAN) PER 5 UNITS: Performed by: STUDENT IN AN ORGANIZED HEALTH CARE EDUCATION/TRAINING PROGRAM

## 2022-09-08 PROCEDURE — 99153 MOD SED SAME PHYS/QHP EA: CPT | Performed by: STUDENT IN AN ORGANIZED HEALTH CARE EDUCATION/TRAINING PROGRAM

## 2022-09-08 PROCEDURE — 63710000001 INSULIN GLARGINE PER 5 UNITS: Performed by: STUDENT IN AN ORGANIZED HEALTH CARE EDUCATION/TRAINING PROGRAM

## 2022-09-08 PROCEDURE — 25010000002 MIDAZOLAM PER 1 MG: Performed by: STUDENT IN AN ORGANIZED HEALTH CARE EDUCATION/TRAINING PROGRAM

## 2022-09-08 PROCEDURE — C1769 GUIDE WIRE: HCPCS | Performed by: STUDENT IN AN ORGANIZED HEALTH CARE EDUCATION/TRAINING PROGRAM

## 2022-09-08 PROCEDURE — 93926 LOWER EXTREMITY STUDY: CPT

## 2022-09-08 PROCEDURE — 85025 COMPLETE CBC W/AUTO DIFF WBC: CPT | Performed by: STUDENT IN AN ORGANIZED HEALTH CARE EDUCATION/TRAINING PROGRAM

## 2022-09-08 PROCEDURE — 85610 PROTHROMBIN TIME: CPT | Performed by: STUDENT IN AN ORGANIZED HEALTH CARE EDUCATION/TRAINING PROGRAM

## 2022-09-08 PROCEDURE — 80048 BASIC METABOLIC PNL TOTAL CA: CPT | Performed by: STUDENT IN AN ORGANIZED HEALTH CARE EDUCATION/TRAINING PROGRAM

## 2022-09-08 PROCEDURE — 25010000002 FENTANYL CITRATE (PF) 50 MCG/ML SOLUTION: Performed by: STUDENT IN AN ORGANIZED HEALTH CARE EDUCATION/TRAINING PROGRAM

## 2022-09-08 PROCEDURE — 99232 SBSQ HOSP IP/OBS MODERATE 35: CPT | Performed by: INTERNAL MEDICINE

## 2022-09-08 PROCEDURE — 25010000002 HEPARIN (PORCINE) 25000-0.45 UT/250ML-% SOLUTION: Performed by: STUDENT IN AN ORGANIZED HEALTH CARE EDUCATION/TRAINING PROGRAM

## 2022-09-08 PROCEDURE — 0 IOPAMIDOL PER 1 ML: Performed by: STUDENT IN AN ORGANIZED HEALTH CARE EDUCATION/TRAINING PROGRAM

## 2022-09-08 PROCEDURE — 75625 CONTRAST EXAM ABDOMINL AORTA: CPT | Performed by: STUDENT IN AN ORGANIZED HEALTH CARE EDUCATION/TRAINING PROGRAM

## 2022-09-08 PROCEDURE — 82962 GLUCOSE BLOOD TEST: CPT

## 2022-09-08 PROCEDURE — 99152 MOD SED SAME PHYS/QHP 5/>YRS: CPT | Performed by: STUDENT IN AN ORGANIZED HEALTH CARE EDUCATION/TRAINING PROGRAM

## 2022-09-08 PROCEDURE — 25010000002 HYDROMORPHONE 1 MG/ML SOLUTION: Performed by: STUDENT IN AN ORGANIZED HEALTH CARE EDUCATION/TRAINING PROGRAM

## 2022-09-08 RX ORDER — MIDAZOLAM HYDROCHLORIDE 1 MG/ML
INJECTION INTRAMUSCULAR; INTRAVENOUS AS NEEDED
Status: DISCONTINUED | OUTPATIENT
Start: 2022-09-08 | End: 2022-09-08 | Stop reason: HOSPADM

## 2022-09-08 RX ORDER — LIDOCAINE HYDROCHLORIDE 20 MG/ML
INJECTION, SOLUTION INFILTRATION; PERINEURAL AS NEEDED
Status: DISCONTINUED | OUTPATIENT
Start: 2022-09-08 | End: 2022-09-08 | Stop reason: HOSPADM

## 2022-09-08 RX ORDER — SODIUM CHLORIDE 9 MG/ML
INJECTION, SOLUTION INTRAVENOUS CONTINUOUS PRN
Status: COMPLETED | OUTPATIENT
Start: 2022-09-08 | End: 2022-09-08

## 2022-09-08 RX ORDER — HEPARIN SODIUM 10000 [USP'U]/100ML
18 INJECTION, SOLUTION INTRAVENOUS
Status: DISCONTINUED | OUTPATIENT
Start: 2022-09-08 | End: 2022-09-09 | Stop reason: ALTCHOICE

## 2022-09-08 RX ORDER — FENTANYL CITRATE 50 UG/ML
INJECTION, SOLUTION INTRAMUSCULAR; INTRAVENOUS AS NEEDED
Status: DISCONTINUED | OUTPATIENT
Start: 2022-09-08 | End: 2022-09-08 | Stop reason: HOSPADM

## 2022-09-08 RX ORDER — HYDROCODONE BITARTRATE AND ACETAMINOPHEN 7.5; 325 MG/1; MG/1
1 TABLET ORAL EVERY 4 HOURS PRN
Status: DISCONTINUED | OUTPATIENT
Start: 2022-09-08 | End: 2022-09-11

## 2022-09-08 RX ORDER — HEPARIN SODIUM 10000 [USP'U]/100ML
500 INJECTION, SOLUTION INTRAVENOUS CONTINUOUS
Status: DISCONTINUED | OUTPATIENT
Start: 2022-09-08 | End: 2022-09-08

## 2022-09-08 RX ORDER — SODIUM CHLORIDE 9 MG/ML
100 INJECTION, SOLUTION INTRAVENOUS CONTINUOUS
Status: DISCONTINUED | OUTPATIENT
Start: 2022-09-08 | End: 2022-09-10

## 2022-09-08 RX ORDER — HEPARIN SODIUM 10000 [USP'U]/100ML
7 INJECTION, SOLUTION INTRAVENOUS
Status: DISCONTINUED | OUTPATIENT
Start: 2022-09-08 | End: 2022-09-08

## 2022-09-08 RX ORDER — HEPARIN SODIUM 10000 [USP'U]/100ML
12 INJECTION, SOLUTION INTRAVENOUS
Status: ACTIVE | OUTPATIENT
Start: 2022-09-08 | End: 2022-09-08

## 2022-09-08 RX ORDER — HEPARIN SODIUM 10000 [USP'U]/100ML
18 INJECTION, SOLUTION INTRAVENOUS
Status: DISCONTINUED | OUTPATIENT
Start: 2022-09-08 | End: 2022-09-08

## 2022-09-08 RX ADMIN — HEPARIN SODIUM 18 UNITS/KG/HR: 10000 INJECTION, SOLUTION INTRAVENOUS at 20:10

## 2022-09-08 RX ADMIN — HYDROCODONE BITARTRATE AND ACETAMINOPHEN 1 TABLET: 7.5; 325 TABLET ORAL at 19:40

## 2022-09-08 RX ADMIN — Medication 10 ML: at 14:53

## 2022-09-08 RX ADMIN — HYDROXYZINE HYDROCHLORIDE 100 MG: 25 TABLET, FILM COATED ORAL at 23:23

## 2022-09-08 RX ADMIN — INSULIN LISPRO 5 UNITS: 100 INJECTION, SOLUTION INTRAVENOUS; SUBCUTANEOUS at 07:51

## 2022-09-08 RX ADMIN — INSULIN GLARGINE 20 UNITS: 100 INJECTION, SOLUTION SUBCUTANEOUS at 23:20

## 2022-09-08 RX ADMIN — HYDROMORPHONE HYDROCHLORIDE 0.5 MG: 1 INJECTION, SOLUTION INTRAMUSCULAR; INTRAVENOUS; SUBCUTANEOUS at 14:52

## 2022-09-08 RX ADMIN — INSULIN LISPRO 10 UNITS: 100 INJECTION, SOLUTION INTRAVENOUS; SUBCUTANEOUS at 19:38

## 2022-09-08 RX ADMIN — HEPARIN SODIUM 20 UNITS/KG/HR: 10000 INJECTION, SOLUTION INTRAVENOUS at 01:02

## 2022-09-08 RX ADMIN — INSULIN LISPRO 3 UNITS: 100 INJECTION, SOLUTION INTRAVENOUS; SUBCUTANEOUS at 15:31

## 2022-09-08 RX ADMIN — HEPARIN SODIUM 7 UNITS/KG/HR: 10000 INJECTION, SOLUTION INTRAVENOUS at 14:01

## 2022-09-08 RX ADMIN — HYDROMORPHONE HYDROCHLORIDE 0.5 MG: 1 INJECTION, SOLUTION INTRAMUSCULAR; INTRAVENOUS; SUBCUTANEOUS at 23:39

## 2022-09-08 RX ADMIN — HYDROMORPHONE HYDROCHLORIDE 0.5 MG: 1 INJECTION, SOLUTION INTRAMUSCULAR; INTRAVENOUS; SUBCUTANEOUS at 12:14

## 2022-09-08 RX ADMIN — HYDROMORPHONE HYDROCHLORIDE 0.5 MG: 1 INJECTION, SOLUTION INTRAMUSCULAR; INTRAVENOUS; SUBCUTANEOUS at 19:40

## 2022-09-08 RX ADMIN — METOPROLOL TARTRATE 100 MG: 50 TABLET, FILM COATED ORAL at 23:23

## 2022-09-08 NOTE — PLAN OF CARE
Problem: Adult Inpatient Plan of Care  Goal: Plan of Care Review  Outcome: Ongoing, Progressing  Flowsheets (Taken 9/8/2022 0253)  Outcome Evaluation: Heparin gtt continues per orders. Next aPTT scheduled for 0700. Pt is NPO at [facility] for bilateral stent placement in the cath lab. Pt is aware she is NPO. Accuchecks changed to Q6 per protocol while NPO. Pt on tele with some bradycardia noted at this time. No C/O voiced at this time. Pt resting abed. Will continue to monitor.  Goal: Patient-Specific Goal (Individualized)  Outcome: Ongoing, Progressing  Goal: Absence of Hospital-Acquired Illness or Injury  Outcome: Ongoing, Progressing  Intervention: Identify and Manage Fall Risk  Recent Flowsheet Documentation  Taken 9/8/2022 0231 by Karlos Escobedo RN  Safety Promotion/Fall Prevention: safety round/check completed  Taken 9/8/2022 0007 by Karlos Escobedo RN  Safety Promotion/Fall Prevention: safety round/check completed  Taken 9/7/2022 2259 by Karlos Escobedo RN  Safety Promotion/Fall Prevention: safety round/check completed  Taken 9/7/2022 2038 by Karlos Escobedo RN  Safety Promotion/Fall Prevention:   safety round/check completed   nonskid shoes/slippers when out of bed   fall prevention program maintained   clutter free environment maintained   assistive device/personal items within reach  Intervention: Prevent Skin Injury  Recent Flowsheet Documentation  Taken 9/7/2022 2038 by Karlos Escobedo RN  Body Position:   supine   position changed independently  Intervention: Prevent and Manage VTE (Venous Thromboembolism) Risk  Recent Flowsheet Documentation  Taken 9/7/2022 2038 by Karlos Escobedo RN  Activity Management: bedrest  Intervention: Prevent Infection  Recent Flowsheet Documentation  Taken 9/8/2022 0231 by Karlos Escobedo RN  Infection Prevention:   hand hygiene promoted   personal protective equipment utilized   rest/sleep promoted   single patient room provided  Taken 9/8/2022 0007 by Karlos Escobedo  RN  Infection Prevention:   hand hygiene promoted   personal protective equipment utilized   rest/sleep promoted   single patient room provided  Taken 9/7/2022 2259 by Karlos Escobedo RN  Infection Prevention:   hand hygiene promoted   personal protective equipment utilized   rest/sleep promoted   single patient room provided  Taken 9/7/2022 2038 by Karlos Escobedo RN  Infection Prevention:   hand hygiene promoted   personal protective equipment utilized   rest/sleep promoted   single patient room provided  Goal: Optimal Comfort and Wellbeing  Outcome: Ongoing, Progressing  Intervention: Monitor Pain and Promote Comfort  Recent Flowsheet Documentation  Taken 9/7/2022 2038 by Karlos Escobedo RN  Pain Management Interventions:   care clustered   pillow support provided   position adjusted   quiet environment facilitated   see MAR   unnecessary movement minimized  Intervention: Provide Person-Centered Care  Recent Flowsheet Documentation  Taken 9/7/2022 2038 by Karlos Escobedo RN  Trust Relationship/Rapport:   care explained   choices provided   questions answered   questions encouraged   reassurance provided   thoughts/feelings acknowledged  Goal: Readiness for Transition of Care  Outcome: Ongoing, Progressing     Problem: VTE (Venous Thromboembolism)  Goal: VTE (Venous Thromboembolism) Symptom Resolution  Outcome: Ongoing, Progressing  Intervention: Prevent or Manage VTE (Venous Thromboembolism)  Recent Flowsheet Documentation  Taken 9/7/2022 2038 by Karlos Escobedo RN  Bleeding Precautions: monitored for signs of bleeding   Goal Outcome Evaluation:              Outcome Evaluation: Heparin gtt continues per orders. Next aPTT scheduled for 0700. Pt is NPO at [facility] for bilateral stent placement in the cath lab. Pt is aware she is NPO. Accuchecks changed to Q6 per protocol while NPO. Pt on tele with some bradycardia noted at this time. No C/O voiced at this time. Pt resting abed. Will continue to monitor.

## 2022-09-08 NOTE — PROGRESS NOTES
[facility] Medicine Services Daily Progress Note    Patient Name: Blossom Martin  : 1965  MRN: [id number]  Primary Care Physician:  Collin Low APRN  Date of admission: 2022      Subjective      Chief Complaint: Right leg pain    2022.b  Patient Reports       Review of Systems   All other systems reviewed and are negative.        Objective      Vitals:   Temp:  [97.3 °F (36.3 °C)-98.2 °F (36.8 °C)] 97.3 °F (36.3 °C)  Heart Rate:  [59-88] 76  Resp:  [18-20] 20  BP: (114-161)/(62-87) 143/87    Physical Exam   Vital signs reviewed.  Nursing notes reviewed.  General: well-developed and well-nourished, NAD  HEENT: NC/AT, EOMI, PERRLA  Heart: RRR. No murmur   Chest: CTAB, no w/r/r, normal respiratory effort  Abdominal: Soft. NT/ND. Bowel sounds present  Musculoskeletal: Normal ROM.  RLE > LLE erythema without warmth or induration, BLE cool to touch  Neurological: AAOx3, no focal deficits  Skin: Skin of BLE cool to touch, ruborous, no other obvious rashes or lesions.  Psychiatric: Normal mood and affect.       Result Review    Result Review:  I have personally reviewed the results from the time of this admission to 2022 20:19 EDT and agree with these findings:  [x]  Laboratory  []  Microbiology  [x]  Radiology  [x]  EKG/Telemetry   [x]  Cardiology/Vascular   []  Pathology  [x]  Old records  []  Other:  Most notable findings include:     2022 CTA abdominal aorta bilateral iliofemoral runoff  IMPRESSION:     1. Complete occlusion, previously stented right common iliac artery, age indeterminate, but potentially acute.  2. Multifocal stenoses involving the right external iliac and common femoral arteries.  3. Single vessel infrapopliteal runoff on the right by the posterior tibial artery. Embolic occlusion of the of the vessels is not excluded.  4. Moderate grade stenosis, mid left external iliac artery.  5. Long segment occlusion, left superficial femoral artery, likely  chronic.  6. Moderate grade proximal left renal arterial stenosis.  7. Hepatic steatosis.   8. Numerous additional findings, both vascular and nonvascular, as described above in greater detail.      Assessment & Plan      Brief Patient Summary:  Blossom Martin is a 57 y.o. female who presented to [facility] on 9/6/2022 complaining of right lower extremity pain which is worsened over the last 48 hours.  The pain is significantly worsened with walking and is described as a cramping pain in the lower extremity as well as a hot poker burning pain in the right groin.  The patient denies any known injury.     Review of records: Patient had a right iliac stent placed secondary to peripheral vascular disease approximately a year ago at [facility].  Decision to request these records has been made and order placed.       buPROPion XL, 450 mg, Oral, QAM  clopidogrel, 75 mg, Oral, Daily  DULoxetine, 40 mg, Oral, Daily  hydrOXYzine, 100 mg, Oral, Nightly  insulin glargine, 20 Units, Subcutaneous, Nightly  insulin lispro, 0-14 Units, Subcutaneous, TID AC  isosorbide mononitrate, 60 mg, Oral, Daily  linagliptin, 5 mg, Oral, Daily  metoprolol tartrate, 100 mg, Oral, BID       heparin, 18 Units/kg/hr, Last Rate: 18 Units/kg/hr (09/07/22 3807)         Active Hospital Problems:  Active Hospital Problems    Diagnosis    • **Right leg pain    • Arterial stent thrombosis, initial encounter (HCC)    • CAD (coronary artery disease)    • Hypertension    • Anxiety disorder    • Diabetes mellitus (HCC)      Plan:   Right iliac arterial stenosis with stent occlusion and limb ischemia  Peripheral Vascular disease  --continue heparin drip until Vascular team states otherwise  --Vascular surgery following    --plan for right leg thrombolysis in cath lab tomorrow morning    --plan to return to cath lab the following day for stent placement bilaterally  --I expect patient may need brief stay in ICU post-operatively, defer to  vascular surgeon's preference    Diabetes mellitus type 2  -- Glimepiride held on admission  -- Continue Tradjenta as formulary replacement for Januvia  -- Start Lantus 20 units nightly, will adjust dose upward as needed     --Per diabetes educator, patient was on Levemir 75 units daily, until she lost insurance coverage  -- Sliding scale coverage with meals and as needed  -- Accu-Cheks before meals and at bedtime  -- HgbA1c 10.2  -- Diabetes education    CAD  Essential hypertension  -- Continue Plavix, Imdur  -- Continue metoprolol  -- Continue to monitor BP and vitals with cardiac monitoring    Anxiety  --Continue Wellbutrin, Cymbalta, Atarax    DVT prophylaxis:  Medical DVT prophylaxis orders are present.    CODE STATUS:    Level Of Support Discussed With: Patient  Code Status (Patient has no pulse and is not breathing): CPR (Attempt to Resuscitate)  Medical Interventions (Patient has pulse or is breathing): Full Support  Release to patient: Routine Release      Disposition:  I expect patient to be discharged pending clinical course and consultant recommendations.    This patient has been examined wearing appropriate Personal Protective Equipment. 09/07/22      Electronically signed by Gaby Evangelista MD, 09/07/22, 20:19 EDT.  [facility] Team

## 2022-09-08 NOTE — ANESTHESIA PREPROCEDURE EVALUATION
Anesthesia Evaluation     Patient summary reviewed and Nursing notes reviewed   no history of anesthetic complications:  NPO Solid Status: > 8 hours  NPO Liquid Status: > 8 hours           Airway   Dental      Pulmonary    Cardiovascular     ECG reviewed  PT is on anticoagulation therapy  Patient on routine beta blocker    (+) hypertension, CAD, hyperlipidemia,       Neuro/Psych  (+) psychiatric history Anxiety,    GI/Hepatic/Renal/Endo    (+) obesity,   diabetes mellitus,     Musculoskeletal     Abdominal    Substance History      OB/GYN          Other        ROS/Med Hx Other: Arterial stent thrombosis, hyperglycemia, leg pain    PSH  LEFT HEART CATH ANGIOPLASTY                   Anesthesia Plan    ASA 3     general and Lynne     (Patient identified; pre-operative vital signs, all relevant labs/studies, complete medical/surgical/anesthetic history, full medication list, full allergy list, and NPO status obtained/reviewed; physical assessment performed; anesthetic options, side effects, potential complications, risks, and benefits discussed; questions answered; written anesthesia consent obtained; patient cleared for procedure; anesthesia machine and equipment checked and functioning)  intravenous induction     Anesthetic plan, risks, benefits, and alternatives have been provided, discussed and informed consent has been obtained with: patient.    Plan discussed with CRNA and CAA.        CODE STATUS:    Level Of Support Discussed With: Patient  Code Status (Patient has no pulse and is not breathing): CPR (Attempt to Resuscitate)  Medical Interventions (Patient has pulse or is breathing): Full Support  Release to patient: Routine Release

## 2022-09-08 NOTE — OP NOTE
Preoperative Diagnosis:   Peripheral arterial disease with rest pain, acute right limb ischemia    Postoperative Diagnosis:   Same    Procedure Performed:   US guided right common femoral artery access with micropuncture  US guided left common femoral artery access  Aortogram   Left lower extremity arteriogram  US guided placement of non tunneled central line, right IJV      CPT:  07238-89 Abdominal aortogram (no diagnostic quality arterial imaging prior or significant change in vascular status)  13957-13 Unilateral extremity runoff arteriogram (no diagnostic quality arterial imaging prior or significant change in vascular status)  31489 Ultrasound-guided percutaneous vascular access.  .    Surgeon:   Shirley Kurtz MD    Assistant:    None    Anesthesia:   sedation    Estimated Blood Loss:   Minimal    Findings:    Unable to obtain sheath access in right common femoral artery due to vessel calcification and scar tissue in the groin.  Left common femoral artery access and sheath placed and arteriograms were obtained.  Wire access was lost during sheath exchange and hematoma was appreciated in left groin.  I decided to abort the procedure and direct pressure was held for hemostasis to the left groin for 30 mins.    Implants:    none    Specimen:   none    Complications:   none    Access:  US guided access of bilateral common femoral arteries, right 4 Fr micropuncture, right 6 Fr    Closure:  Manual pressure for 30 minutes    Dispo:  To PACU    Indication for procedure:   57 y.o. female with occluded right common iliac artery stents with acute limb ischemia of the right leg.  She presents today for arteriogram and thrombolysis initiation.     Description of procedure:   Patient brought to the cath lab and positioned supine on the operating table.  Timeout performed with all OR staff present and in agreement.  Right neck prepped and draped in standard sterile fashion.  Under US guidance, the right IJV was accessed  with a finder needle and guidewire was advanced into the SVC.  The skin was cut then the dilator was passed over the wire and triple lumen central line was placed into the right IJV.  This was flushed with saline and secured with sutures.  Dressings placed. Bilateral groins prepped and draped in standard sterile fashion.  Under US guidance, the right common femoral artery was accessed using a micropuncture needle and wire.  The artery was calcified and there was a large amount of scar tissue in the groin.  I attempted to pass multiple micropuncture catheters and dilators into the artery but was ultimately unsuccessful in obtained right common femoral artery access.  Direct pressure was held for hemostasis.  I proceeded to obtain access on the left under US guidance.  The left common femoral artery was accessed and a micropuncture catheter was placed over the wire and an angiogram was performed that confirmed a common femoral artery puncture.  A soft 0.035 wire was advanced centrally into the infrarenal aorta, then an omniflush catheter was advanced into the infrarenal aorta.  An aortogram was obtained that demonstrated: patent renal arteries bilaterally, occlusion of the right common and external iliac artery stents with reconstitution at the distal external iliac artery, patent left common, external, hypogastric arteries. Up and over access was obtained with an omniflush catheter and we attempted to exchange for a rim catheter but were unsuccessful.  I think attempted to place a TourGuide stearable sheath in the left common femoral artery but access was lost during this maneuver.  She developed a left femoral hematoma at this time so direct pressure was held and I elected to abort the procedure.  Direct manual pressure was held on the left groin for >25 mins with improvement in the hematoma.  A stat pseudoaneurysm duplex was ordered for the post operative area.      All counts were reported as correct at the  conclusion of the procedure.  Patient tolerated the procedure well without any apparent complications.  Anesthesia was reversed and patient was transferred to the PACU in stable condition.      Shirley Kurtz MD  09/08/22

## 2022-09-08 NOTE — PROGRESS NOTES
[facility] Medicine Services Daily Progress Note    Patient Name: Blossom Martin  : 1965  MRN: [id number]  Primary Care Physician:  Collin Low APRN  Date of admission: 2022      Subjective      Chief Complaint: Right leg pain    2022  Patient Reports no new complaints. Per d/w nurse, the thrombolysis could not be done. She will be proceeding with surgery tomorrow.      Review of Systems   All other systems reviewed and are negative.        Objective      Vitals:   Temp:  [97.5 °F (36.4 °C)-98.1 °F (36.7 °C)] 97.6 °F (36.4 °C)  Heart Rate:  [53-74] 63  Resp:  [12-21] 17  BP: ()/() 121/58  Flow (L/min):  [2] 2    Physical Exam   Vital signs reviewed.  Nursing notes reviewed.  General: well-developed and well-nourished, NAD  HEENT: NC/AT, EOMI, PERRLA  Heart: RRR. No murmur   Chest: CTAB, no w/r/r, normal respiratory effort  Abdominal: Soft. NT/ND. Bowel sounds present  Musculoskeletal: Normal ROM.  RLE > LLE erythema without warmth or induration, BLE cool to touch  Neurological: AAOx3, no focal deficits  Skin: Skin of BLE cool to touch, ruborous, no other obvious rashes or lesions.  Psychiatric: Normal mood and affect.       Result Review    Result Review:  I have personally reviewed the results from the time of this admission to 2022 18:17 EDT and agree with these findings:  [x]  Laboratory  []  Microbiology  [x]  Radiology  [x]  EKG/Telemetry   [x]  Cardiology/Vascular   []  Pathology  [x]  Old records  []  Other:  Most notable findings include:     2022 CTA abdominal aorta bilateral iliofemoral runoff  IMPRESSION:     1. Complete occlusion, previously stented right common iliac artery, age indeterminate, but potentially acute.  2. Multifocal stenoses involving the right external iliac and common femoral arteries.  3. Single vessel infrapopliteal runoff on the right by the posterior tibial artery. Embolic occlusion of the of the vessels is not  excluded.  4. Moderate grade stenosis, mid left external iliac artery.  5. Long segment occlusion, left superficial femoral artery, likely chronic.  6. Moderate grade proximal left renal arterial stenosis.  7. Hepatic steatosis.   8. Numerous additional findings, both vascular and nonvascular, as described above in greater detail.      Assessment & Plan      Brief Patient Summary:  Blossom Martin is a 57 y.o. female who presented to [facility] on 9/6/2022 complaining of right lower extremity pain which is worsened over the last 48 hours.  The pain is significantly worsened with walking and is described as a cramping pain in the lower extremity as well as a hot poker burning pain in the right groin.  The patient denies any known injury.     Review of records: Patient had a right iliac stent placed secondary to peripheral vascular disease approximately a year ago at [facility].  Decision to request these records has been made and order placed.       buPROPion XL, 450 mg, Oral, QAM  clopidogrel, 75 mg, Oral, Daily  DULoxetine, 40 mg, Oral, Daily  hydrOXYzine, 100 mg, Oral, Nightly  insulin glargine, 20 Units, Subcutaneous, Nightly  insulin lispro, 0-14 Units, Subcutaneous, TID AC  isosorbide mononitrate, 60 mg, Oral, Daily  linagliptin, 5 mg, Oral, Daily  metoprolol tartrate, 100 mg, Oral, BID       heparin, 12 Units/kg/hr, Last Rate: 12 Units/kg/hr (09/08/22 1653)  heparin, 18 Units/kg/hr  sodium chloride, 100 mL/hr, Last Rate: Stopped (09/08/22 1702)         Active Hospital Problems:  Active Hospital Problems    Diagnosis    • **Right leg pain    • Arterial stent thrombosis, initial encounter (HCC)    • CAD (coronary artery disease)    • Hypertension    • Anxiety disorder    • Diabetes mellitus (HCC)      Plan:   Right iliac arterial stenosis with stent occlusion and limb ischemia  Peripheral Vascular disease  --continue heparin drip until Vascular team states otherwise  --Vascular surgery  following    --plan for right leg thrombolysis in cath lab tomorrow morning    --plan to return to cath lab the following day for stent placement bilaterally  --I expect patient may need brief stay in ICU post-operatively, defer to vascular surgeon's preference    Diabetes mellitus type 2  -- Glimepiride held on admission  -- Continue Tradjenta as formulary replacement for Januvia  -- Start Lantus 20 units nightly, will adjust dose upward as needed     --Per diabetes educator, patient was on Levemir 75 units daily, until she lost insurance coverage  -- Sliding scale coverage with meals and as needed  -- Accu-Cheks before meals and at bedtime  -- HgbA1c 10.2  -- Diabetes education    CAD  Essential hypertension  -- Continue Plavix, Imdur  -- Continue metoprolol  -- Continue to monitor BP and vitals with cardiac monitoring    Anxiety  --Continue Wellbutrin, Cymbalta, Atarax    DVT prophylaxis:  Medical DVT prophylaxis orders are present.    CODE STATUS:    Level Of Support Discussed With: Patient  Code Status (Patient has no pulse and is not breathing): CPR (Attempt to Resuscitate)  Medical Interventions (Patient has pulse or is breathing): Full Support  Release to patient: Routine Release      Disposition:  I expect patient to be discharged pending clinical course and consultant recommendations.    This patient has been examined wearing appropriate Personal Protective Equipment. 09/08/22      Electronically signed by Gaby Evangelista MD, 09/08/22, 18:17 EDT.  [facility] Hospitalist Team

## 2022-09-09 ENCOUNTER — APPOINTMENT (OUTPATIENT)
Dept: GENERAL RADIOLOGY | Facility: HOSPITAL | Age: 57
End: 2022-09-09

## 2022-09-09 ENCOUNTER — ANESTHESIA (OUTPATIENT)
Dept: PERIOP | Facility: HOSPITAL | Age: 57
End: 2022-09-09

## 2022-09-09 LAB
ANION GAP SERPL CALCULATED.3IONS-SCNC: 11 MMOL/L (ref 5–15)
APTT PPP: 35.6 SECONDS (ref 61–76.5)
APTT PPP: 59.3 SECONDS (ref 61–76.5)
BASOPHILS # BLD AUTO: 0.1 10*3/MM3 (ref 0–0.2)
BASOPHILS NFR BLD AUTO: 0.8 % (ref 0–1.5)
BUN SERPL-MCNC: 10 MG/DL (ref 6–20)
BUN/CREAT SERPL: 18.9 (ref 7–25)
CALCIUM SPEC-SCNC: 8.6 MG/DL (ref 8.6–10.5)
CHLORIDE SERPL-SCNC: 103 MMOL/L (ref 98–107)
CO2 SERPL-SCNC: 22 MMOL/L (ref 22–29)
CREAT SERPL-MCNC: 0.53 MG/DL (ref 0.57–1)
DEPRECATED RDW RBC AUTO: 42.9 FL (ref 37–54)
DEPRECATED RDW RBC AUTO: 43.3 FL (ref 37–54)
EGFRCR SERPLBLD CKD-EPI 2021: 107.4 ML/MIN/1.73
EOSINOPHIL # BLD AUTO: 0.3 10*3/MM3 (ref 0–0.4)
EOSINOPHIL NFR BLD AUTO: 2.4 % (ref 0.3–6.2)
ERYTHROCYTE [DISTWIDTH] IN BLOOD BY AUTOMATED COUNT: 14.1 % (ref 12.3–15.4)
ERYTHROCYTE [DISTWIDTH] IN BLOOD BY AUTOMATED COUNT: 14.2 % (ref 12.3–15.4)
FIBRINOGEN PPP-MCNC: 445 MG/DL (ref 210–450)
GLUCOSE BLDC GLUCOMTR-MCNC: 174 MG/DL (ref 70–105)
GLUCOSE BLDC GLUCOMTR-MCNC: 177 MG/DL (ref 70–105)
GLUCOSE BLDC GLUCOMTR-MCNC: 179 MG/DL (ref 70–105)
GLUCOSE BLDC GLUCOMTR-MCNC: 201 MG/DL (ref 70–105)
GLUCOSE BLDC GLUCOMTR-MCNC: 203 MG/DL (ref 70–105)
GLUCOSE BLDC GLUCOMTR-MCNC: 244 MG/DL (ref 70–105)
GLUCOSE SERPL-MCNC: 290 MG/DL (ref 65–99)
HCT VFR BLD AUTO: 40.9 % (ref 34–46.6)
HCT VFR BLD AUTO: 41.3 % (ref 34–46.6)
HGB BLD-MCNC: 13.3 G/DL (ref 12–15.9)
HGB BLD-MCNC: 13.4 G/DL (ref 12–15.9)
INR PPP: 0.97 (ref 0.93–1.1)
LYMPHOCYTES # BLD AUTO: 4.7 10*3/MM3 (ref 0.7–3.1)
LYMPHOCYTES NFR BLD AUTO: 36.5 % (ref 19.6–45.3)
MCH RBC QN AUTO: 28 PG (ref 26.6–33)
MCH RBC QN AUTO: 28.1 PG (ref 26.6–33)
MCHC RBC AUTO-ENTMCNC: 32.2 G/DL (ref 31.5–35.7)
MCHC RBC AUTO-ENTMCNC: 32.7 G/DL (ref 31.5–35.7)
MCV RBC AUTO: 85.9 FL (ref 79–97)
MCV RBC AUTO: 86.8 FL (ref 79–97)
MONOCYTES # BLD AUTO: 0.8 10*3/MM3 (ref 0.1–0.9)
MONOCYTES NFR BLD AUTO: 6.2 % (ref 5–12)
NEUTROPHILS NFR BLD AUTO: 54.1 % (ref 42.7–76)
NEUTROPHILS NFR BLD AUTO: 6.9 10*3/MM3 (ref 1.7–7)
NRBC BLD AUTO-RTO: 0 /100 WBC (ref 0–0.2)
PLATELET # BLD AUTO: 229 10*3/MM3 (ref 140–450)
PLATELET # BLD AUTO: 259 10*3/MM3 (ref 140–450)
PMV BLD AUTO: 7.9 FL (ref 6–12)
PMV BLD AUTO: 8.5 FL (ref 6–12)
POTASSIUM SERPL-SCNC: 3.9 MMOL/L (ref 3.5–5.2)
PROTHROMBIN TIME: 10 SECONDS (ref 9.6–11.7)
RBC # BLD AUTO: 4.76 10*6/MM3 (ref 3.77–5.28)
RBC # BLD AUTO: 4.76 10*6/MM3 (ref 3.77–5.28)
SODIUM SERPL-SCNC: 136 MMOL/L (ref 136–145)
WBC NRBC COR # BLD: 12.8 10*3/MM3 (ref 3.4–10.8)
WBC NRBC COR # BLD: 20.1 10*3/MM3 (ref 3.4–10.8)

## 2022-09-09 PROCEDURE — 63710000001 INSULIN GLARGINE PER 5 UNITS: Performed by: STUDENT IN AN ORGANIZED HEALTH CARE EDUCATION/TRAINING PROGRAM

## 2022-09-09 PROCEDURE — 82803 BLOOD GASES ANY COMBINATION: CPT

## 2022-09-09 PROCEDURE — 75710 ARTERY X-RAYS ARM/LEG: CPT

## 2022-09-09 PROCEDURE — 047D0DZ DILATION OF LEFT COMMON ILIAC ARTERY WITH INTRALUMINAL DEVICE, OPEN APPROACH: ICD-10-PCS | Performed by: STUDENT IN AN ORGANIZED HEALTH CARE EDUCATION/TRAINING PROGRAM

## 2022-09-09 PROCEDURE — 25010000002 HYDROMORPHONE 1 MG/ML SOLUTION: Performed by: NURSE PRACTITIONER

## 2022-09-09 PROCEDURE — 25010000002 CEFAZOLIN PER 500 MG: Performed by: NURSE PRACTITIONER

## 2022-09-09 PROCEDURE — 25010000002 PROPOFOL 10 MG/ML EMULSION: Performed by: NURSE ANESTHETIST, CERTIFIED REGISTERED

## 2022-09-09 PROCEDURE — 80048 BASIC METABOLIC PNL TOTAL CA: CPT | Performed by: STUDENT IN AN ORGANIZED HEALTH CARE EDUCATION/TRAINING PROGRAM

## 2022-09-09 PROCEDURE — 85014 HEMATOCRIT: CPT

## 2022-09-09 PROCEDURE — 25010000002 HYDRALAZINE PER 20 MG: Performed by: NURSE ANESTHETIST, CERTIFIED REGISTERED

## 2022-09-09 PROCEDURE — 85347 COAGULATION TIME ACTIVATED: CPT

## 2022-09-09 PROCEDURE — 82947 ASSAY GLUCOSE BLOOD QUANT: CPT

## 2022-09-09 PROCEDURE — 25010000002 HEPARIN (PORCINE) 25000-0.45 UT/250ML-% SOLUTION: Performed by: STUDENT IN AN ORGANIZED HEALTH CARE EDUCATION/TRAINING PROGRAM

## 2022-09-09 PROCEDURE — 85730 THROMBOPLASTIN TIME PARTIAL: CPT | Performed by: STUDENT IN AN ORGANIZED HEALTH CARE EDUCATION/TRAINING PROGRAM

## 2022-09-09 PROCEDURE — 63710000001 INSULIN REGULAR HUMAN PER 5 UNITS: Performed by: NURSE ANESTHETIST, CERTIFIED REGISTERED

## 2022-09-09 PROCEDURE — 25010000002 CEFAZOLIN PER 500 MG: Performed by: STUDENT IN AN ORGANIZED HEALTH CARE EDUCATION/TRAINING PROGRAM

## 2022-09-09 PROCEDURE — B41F1ZZ FLUOROSCOPY OF RIGHT LOWER EXTREMITY ARTERIES USING LOW OSMOLAR CONTRAST: ICD-10-PCS | Performed by: STUDENT IN AN ORGANIZED HEALTH CARE EDUCATION/TRAINING PROGRAM

## 2022-09-09 PROCEDURE — 85025 COMPLETE CBC W/AUTO DIFF WBC: CPT | Performed by: STUDENT IN AN ORGANIZED HEALTH CARE EDUCATION/TRAINING PROGRAM

## 2022-09-09 PROCEDURE — 25010000002 FENTANYL CITRATE (PF) 50 MCG/ML SOLUTION: Performed by: NURSE ANESTHETIST, CERTIFIED REGISTERED

## 2022-09-09 PROCEDURE — 84295 ASSAY OF SERUM SODIUM: CPT

## 2022-09-09 PROCEDURE — 25010000002 HEPARIN (PORCINE) PER 1000 UNITS: Performed by: NURSE ANESTHETIST, CERTIFIED REGISTERED

## 2022-09-09 PROCEDURE — C1757 CATH, THROMBECTOMY/EMBOLECT: HCPCS | Performed by: STUDENT IN AN ORGANIZED HEALTH CARE EDUCATION/TRAINING PROGRAM

## 2022-09-09 PROCEDURE — 76000 FLUOROSCOPY <1 HR PHYS/QHP: CPT

## 2022-09-09 PROCEDURE — 82330 ASSAY OF CALCIUM: CPT

## 2022-09-09 PROCEDURE — C1874 STENT, COATED/COV W/DEL SYS: HCPCS | Performed by: STUDENT IN AN ORGANIZED HEALTH CARE EDUCATION/TRAINING PROGRAM

## 2022-09-09 PROCEDURE — 047H0D1 DILATION OF RIGHT EXTERNAL ILIAC ARTERY WITH INTRALUMINAL DEVICE, USING DRUG-COATED BALLOON, OPEN APPROACH: ICD-10-PCS | Performed by: STUDENT IN AN ORGANIZED HEALTH CARE EDUCATION/TRAINING PROGRAM

## 2022-09-09 PROCEDURE — 85027 COMPLETE CBC AUTOMATED: CPT | Performed by: STUDENT IN AN ORGANIZED HEALTH CARE EDUCATION/TRAINING PROGRAM

## 2022-09-09 PROCEDURE — 0 IOPAMIDOL PER 1 ML: Performed by: STUDENT IN AN ORGANIZED HEALTH CARE EDUCATION/TRAINING PROGRAM

## 2022-09-09 PROCEDURE — C1769 GUIDE WIRE: HCPCS | Performed by: STUDENT IN AN ORGANIZED HEALTH CARE EDUCATION/TRAINING PROGRAM

## 2022-09-09 PROCEDURE — 85610 PROTHROMBIN TIME: CPT | Performed by: STUDENT IN AN ORGANIZED HEALTH CARE EDUCATION/TRAINING PROGRAM

## 2022-09-09 PROCEDURE — 25010000002 ONDANSETRON PER 1 MG: Performed by: NURSE ANESTHETIST, CERTIFIED REGISTERED

## 2022-09-09 PROCEDURE — C1894 INTRO/SHEATH, NON-LASER: HCPCS | Performed by: STUDENT IN AN ORGANIZED HEALTH CARE EDUCATION/TRAINING PROGRAM

## 2022-09-09 PROCEDURE — 82962 GLUCOSE BLOOD TEST: CPT

## 2022-09-09 PROCEDURE — 047C0DZ DILATION OF RIGHT COMMON ILIAC ARTERY WITH INTRALUMINAL DEVICE, OPEN APPROACH: ICD-10-PCS | Performed by: STUDENT IN AN ORGANIZED HEALTH CARE EDUCATION/TRAINING PROGRAM

## 2022-09-09 PROCEDURE — C2623 CATH, TRANSLUMIN, DRUG-COAT: HCPCS | Performed by: STUDENT IN AN ORGANIZED HEALTH CARE EDUCATION/TRAINING PROGRAM

## 2022-09-09 PROCEDURE — 85384 FIBRINOGEN ACTIVITY: CPT | Performed by: STUDENT IN AN ORGANIZED HEALTH CARE EDUCATION/TRAINING PROGRAM

## 2022-09-09 PROCEDURE — 25010000002 ONDANSETRON PER 1 MG: Performed by: NURSE PRACTITIONER

## 2022-09-09 PROCEDURE — 84132 ASSAY OF SERUM POTASSIUM: CPT

## 2022-09-09 PROCEDURE — 25010000002 VANCOMYCIN 1 G RECONSTITUTED SOLUTION 1 EACH VIAL: Performed by: STUDENT IN AN ORGANIZED HEALTH CARE EDUCATION/TRAINING PROGRAM

## 2022-09-09 PROCEDURE — B4101ZZ FLUOROSCOPY OF ABDOMINAL AORTA USING LOW OSMOLAR CONTRAST: ICD-10-PCS | Performed by: STUDENT IN AN ORGANIZED HEALTH CARE EDUCATION/TRAINING PROGRAM

## 2022-09-09 PROCEDURE — 25010000002 HEPARIN (PORCINE) 1000-0.9 UT/500ML-% SOLUTION: Performed by: ANESTHESIOLOGY

## 2022-09-09 PROCEDURE — 25010000002 HEPARIN (PORCINE) PER 1000 UNITS: Performed by: STUDENT IN AN ORGANIZED HEALTH CARE EDUCATION/TRAINING PROGRAM

## 2022-09-09 PROCEDURE — 25010000002 HYDROMORPHONE 1 MG/ML SOLUTION: Performed by: STUDENT IN AN ORGANIZED HEALTH CARE EDUCATION/TRAINING PROGRAM

## 2022-09-09 PROCEDURE — 63710000001 INSULIN LISPRO (HUMAN) PER 5 UNITS: Performed by: NURSE PRACTITIONER

## 2022-09-09 PROCEDURE — C1725 CATH, TRANSLUMIN NON-LASER: HCPCS | Performed by: STUDENT IN AN ORGANIZED HEALTH CARE EDUCATION/TRAINING PROGRAM

## 2022-09-09 PROCEDURE — 25010000002 FENTANYL CITRATE (PF) 100 MCG/2ML SOLUTION: Performed by: NURSE ANESTHETIST, CERTIFIED REGISTERED

## 2022-09-09 DEVICE — STENTGR ENDOPROSTH VIABAHN VBX EXP 7F 7X11X59MM 135CM: Type: IMPLANTABLE DEVICE | Site: ARTERY ILIAC | Status: FUNCTIONAL

## 2022-09-09 DEVICE — LIGACLIP MCA MULTIPLE CLIP APPLIERS, 20 MEDIUM CLIPS
Type: IMPLANTABLE DEVICE | Site: GROIN | Status: FUNCTIONAL
Brand: LIGACLIP

## 2022-09-09 DEVICE — STENTGR ENDOPROSTH VIABAHN VBX EXP 8F 8X16X79MM 135CM: Type: IMPLANTABLE DEVICE | Site: ARTERY ILIAC | Status: FUNCTIONAL

## 2022-09-09 DEVICE — ABSORBABLE HEMOSTAT (OXIDIZED REGENERATED CELLULOSE, U.S.P.)
Type: IMPLANTABLE DEVICE | Site: GROIN | Status: FUNCTIONAL
Brand: SURGICEL FIBRILLAR

## 2022-09-09 DEVICE — LIGACLIP MCA MULTIPLE CLIP APPLIERS, 20 SMALL CLIPS
Type: IMPLANTABLE DEVICE | Site: GROIN | Status: FUNCTIONAL
Brand: LIGACLIP

## 2022-09-09 RX ORDER — ROCURONIUM BROMIDE 10 MG/ML
INJECTION, SOLUTION INTRAVENOUS AS NEEDED
Status: DISCONTINUED | OUTPATIENT
Start: 2022-09-09 | End: 2022-09-09 | Stop reason: SURG

## 2022-09-09 RX ORDER — HYDRALAZINE HYDROCHLORIDE 20 MG/ML
5 INJECTION INTRAMUSCULAR; INTRAVENOUS
Status: DISCONTINUED | OUTPATIENT
Start: 2022-09-09 | End: 2022-09-09 | Stop reason: HOSPADM

## 2022-09-09 RX ORDER — PROPOFOL 10 MG/ML
VIAL (ML) INTRAVENOUS AS NEEDED
Status: DISCONTINUED | OUTPATIENT
Start: 2022-09-09 | End: 2022-09-09 | Stop reason: SURG

## 2022-09-09 RX ORDER — LIDOCAINE HYDROCHLORIDE 10 MG/ML
0.5 INJECTION, SOLUTION INFILTRATION; PERINEURAL ONCE AS NEEDED
Status: DISCONTINUED | OUTPATIENT
Start: 2022-09-09 | End: 2022-09-09 | Stop reason: HOSPADM

## 2022-09-09 RX ORDER — ONDANSETRON 2 MG/ML
INJECTION INTRAMUSCULAR; INTRAVENOUS AS NEEDED
Status: DISCONTINUED | OUTPATIENT
Start: 2022-09-09 | End: 2022-09-09 | Stop reason: SURG

## 2022-09-09 RX ORDER — PHENYLEPHRINE HCL IN 0.9% NACL 1 MG/10 ML
SYRINGE (ML) INTRAVENOUS AS NEEDED
Status: DISCONTINUED | OUTPATIENT
Start: 2022-09-09 | End: 2022-09-09 | Stop reason: SURG

## 2022-09-09 RX ORDER — EPHEDRINE SULFATE 5 MG/ML
INJECTION INTRAVENOUS AS NEEDED
Status: DISCONTINUED | OUTPATIENT
Start: 2022-09-09 | End: 2022-09-09 | Stop reason: SURG

## 2022-09-09 RX ORDER — HEPARIN SODIUM 1000 [USP'U]/ML
INJECTION, SOLUTION INTRAVENOUS; SUBCUTANEOUS AS NEEDED
Status: DISCONTINUED | OUTPATIENT
Start: 2022-09-09 | End: 2022-09-09 | Stop reason: SURG

## 2022-09-09 RX ORDER — INSULIN LISPRO 100 [IU]/ML
0-14 INJECTION, SOLUTION INTRAVENOUS; SUBCUTANEOUS
Status: DISCONTINUED | OUTPATIENT
Start: 2022-09-09 | End: 2022-09-12 | Stop reason: HOSPADM

## 2022-09-09 RX ORDER — SCOLOPAMINE TRANSDERMAL SYSTEM 1 MG/1
PATCH, EXTENDED RELEASE TRANSDERMAL AS NEEDED
Status: DISCONTINUED | OUTPATIENT
Start: 2022-09-09 | End: 2022-09-09 | Stop reason: SURG

## 2022-09-09 RX ORDER — NICOTINE POLACRILEX 4 MG
15 LOZENGE BUCCAL
Status: DISCONTINUED | OUTPATIENT
Start: 2022-09-09 | End: 2022-09-09

## 2022-09-09 RX ORDER — OLANZAPINE 10 MG/2ML
1 INJECTION, POWDER, LYOPHILIZED, FOR SOLUTION INTRAMUSCULAR
Status: DISCONTINUED | OUTPATIENT
Start: 2022-09-09 | End: 2022-09-09

## 2022-09-09 RX ORDER — SODIUM CHLORIDE, SODIUM LACTATE, POTASSIUM CHLORIDE, CALCIUM CHLORIDE 600; 310; 30; 20 MG/100ML; MG/100ML; MG/100ML; MG/100ML
1000 INJECTION, SOLUTION INTRAVENOUS CONTINUOUS
Status: DISCONTINUED | OUTPATIENT
Start: 2022-09-09 | End: 2022-09-09

## 2022-09-09 RX ORDER — PROMETHAZINE HYDROCHLORIDE 25 MG/1
25 SUPPOSITORY RECTAL EVERY 6 HOURS PRN
Status: DISCONTINUED | OUTPATIENT
Start: 2022-09-09 | End: 2022-09-12 | Stop reason: HOSPADM

## 2022-09-09 RX ORDER — LIDOCAINE HYDROCHLORIDE 20 MG/ML
INJECTION, SOLUTION EPIDURAL; INFILTRATION; INTRACAUDAL; PERINEURAL AS NEEDED
Status: DISCONTINUED | OUTPATIENT
Start: 2022-09-09 | End: 2022-09-09 | Stop reason: SURG

## 2022-09-09 RX ORDER — HEPARIN SODIUM 200 [USP'U]/100ML
INJECTION, SOLUTION INTRAVENOUS
Status: COMPLETED | OUTPATIENT
Start: 2022-09-09 | End: 2022-09-09

## 2022-09-09 RX ORDER — CLOPIDOGREL BISULFATE 75 MG/1
75 TABLET ORAL DAILY
Status: DISCONTINUED | OUTPATIENT
Start: 2022-09-09 | End: 2022-09-12 | Stop reason: HOSPADM

## 2022-09-09 RX ORDER — SODIUM CHLORIDE 0.9 % (FLUSH) 0.9 %
10 SYRINGE (ML) INJECTION AS NEEDED
Status: DISCONTINUED | OUTPATIENT
Start: 2022-09-09 | End: 2022-09-09 | Stop reason: HOSPADM

## 2022-09-09 RX ORDER — ONDANSETRON 2 MG/ML
8 INJECTION INTRAMUSCULAR; INTRAVENOUS EVERY 6 HOURS PRN
Status: DISCONTINUED | OUTPATIENT
Start: 2022-09-09 | End: 2022-09-12 | Stop reason: HOSPADM

## 2022-09-09 RX ORDER — ACETAMINOPHEN 160 MG/5ML
650 SOLUTION ORAL EVERY 4 HOURS PRN
Status: DISCONTINUED | OUTPATIENT
Start: 2022-09-09 | End: 2022-09-12 | Stop reason: HOSPADM

## 2022-09-09 RX ORDER — ACETAMINOPHEN 325 MG/1
650 TABLET ORAL ONCE AS NEEDED
Status: DISCONTINUED | OUTPATIENT
Start: 2022-09-09 | End: 2022-09-09 | Stop reason: HOSPADM

## 2022-09-09 RX ORDER — ONDANSETRON 2 MG/ML
4 INJECTION INTRAMUSCULAR; INTRAVENOUS ONCE AS NEEDED
Status: COMPLETED | OUTPATIENT
Start: 2022-09-09 | End: 2022-09-09

## 2022-09-09 RX ORDER — DEXTROSE MONOHYDRATE 25 G/50ML
10-50 INJECTION, SOLUTION INTRAVENOUS
Status: DISCONTINUED | OUTPATIENT
Start: 2022-09-09 | End: 2022-09-09

## 2022-09-09 RX ORDER — ONDANSETRON 4 MG/1
4 TABLET, FILM COATED ORAL EVERY 6 HOURS PRN
Status: DISCONTINUED | OUTPATIENT
Start: 2022-09-09 | End: 2022-09-09

## 2022-09-09 RX ORDER — DIPHENHYDRAMINE HYDROCHLORIDE 50 MG/ML
12.5 INJECTION INTRAMUSCULAR; INTRAVENOUS ONCE AS NEEDED
Status: DISCONTINUED | OUTPATIENT
Start: 2022-09-09 | End: 2022-09-09 | Stop reason: HOSPADM

## 2022-09-09 RX ORDER — PROCHLORPERAZINE EDISYLATE 5 MG/ML
5 INJECTION INTRAMUSCULAR; INTRAVENOUS EVERY 6 HOURS PRN
Status: DISCONTINUED | OUTPATIENT
Start: 2022-09-09 | End: 2022-09-12 | Stop reason: HOSPADM

## 2022-09-09 RX ORDER — HEPARIN SODIUM 10000 [USP'U]/100ML
8 INJECTION, SOLUTION INTRAVENOUS
Status: DISPENSED | OUTPATIENT
Start: 2022-09-09 | End: 2022-09-09

## 2022-09-09 RX ORDER — ACETAMINOPHEN 650 MG/1
650 SUPPOSITORY RECTAL EVERY 4 HOURS PRN
Status: DISCONTINUED | OUTPATIENT
Start: 2022-09-09 | End: 2022-09-12 | Stop reason: HOSPADM

## 2022-09-09 RX ORDER — FENTANYL CITRATE 50 UG/ML
INJECTION, SOLUTION INTRAMUSCULAR; INTRAVENOUS AS NEEDED
Status: DISCONTINUED | OUTPATIENT
Start: 2022-09-09 | End: 2022-09-09 | Stop reason: SURG

## 2022-09-09 RX ORDER — ACETAMINOPHEN 325 MG/1
650 TABLET ORAL EVERY 4 HOURS PRN
Status: DISCONTINUED | OUTPATIENT
Start: 2022-09-09 | End: 2022-09-12 | Stop reason: HOSPADM

## 2022-09-09 RX ORDER — ACETAMINOPHEN 650 MG/1
650 SUPPOSITORY RECTAL ONCE AS NEEDED
Status: DISCONTINUED | OUTPATIENT
Start: 2022-09-09 | End: 2022-09-09 | Stop reason: HOSPADM

## 2022-09-09 RX ORDER — ONDANSETRON 2 MG/ML
4 INJECTION INTRAMUSCULAR; INTRAVENOUS EVERY 6 HOURS PRN
Status: DISCONTINUED | OUTPATIENT
Start: 2022-09-09 | End: 2022-09-09

## 2022-09-09 RX ORDER — ONDANSETRON 4 MG/1
8 TABLET, FILM COATED ORAL EVERY 6 HOURS PRN
Status: DISCONTINUED | OUTPATIENT
Start: 2022-09-09 | End: 2022-09-12 | Stop reason: HOSPADM

## 2022-09-09 RX ORDER — HEPARIN SODIUM 10000 [USP'U]/100ML
18 INJECTION, SOLUTION INTRAVENOUS
Status: DISCONTINUED | OUTPATIENT
Start: 2022-09-09 | End: 2022-09-09

## 2022-09-09 RX ORDER — FENTANYL CITRATE 50 UG/ML
25 INJECTION, SOLUTION INTRAMUSCULAR; INTRAVENOUS
Status: DISCONTINUED | OUTPATIENT
Start: 2022-09-09 | End: 2022-09-09 | Stop reason: HOSPADM

## 2022-09-09 RX ADMIN — ROCURONIUM BROMIDE 20 MG: 50 INJECTION, SOLUTION INTRAVENOUS at 10:04

## 2022-09-09 RX ADMIN — EPHEDRINE SULFATE 5 MG: 5 INJECTION INTRAVENOUS at 09:57

## 2022-09-09 RX ADMIN — SUGAMMADEX 200 MG: 100 INJECTION, SOLUTION INTRAVENOUS at 12:41

## 2022-09-09 RX ADMIN — INSULIN LISPRO 5 UNITS: 100 INJECTION, SOLUTION INTRAVENOUS; SUBCUTANEOUS at 21:03

## 2022-09-09 RX ADMIN — SCOPALAMINE 1 PATCH: 1 PATCH, EXTENDED RELEASE TRANSDERMAL at 07:30

## 2022-09-09 RX ADMIN — HYDROMORPHONE HYDROCHLORIDE 1 MG: 1 INJECTION, SOLUTION INTRAMUSCULAR; INTRAVENOUS; SUBCUTANEOUS at 21:46

## 2022-09-09 RX ADMIN — INSULIN GLARGINE 20 UNITS: 100 INJECTION, SOLUTION SUBCUTANEOUS at 20:37

## 2022-09-09 RX ADMIN — SODIUM CHLORIDE, POTASSIUM CHLORIDE, SODIUM LACTATE AND CALCIUM CHLORIDE 1000 ML: 600; 310; 30; 20 INJECTION, SOLUTION INTRAVENOUS at 07:59

## 2022-09-09 RX ADMIN — CEFAZOLIN 2 G: 2 INJECTION, POWDER, FOR SOLUTION INTRAMUSCULAR; INTRAVENOUS at 08:19

## 2022-09-09 RX ADMIN — INSULIN LISPRO 5 UNITS: 100 INJECTION, SOLUTION INTRAVENOUS; SUBCUTANEOUS at 18:09

## 2022-09-09 RX ADMIN — EPHEDRINE SULFATE 5 MG: 5 INJECTION INTRAVENOUS at 09:49

## 2022-09-09 RX ADMIN — LIDOCAINE HYDROCHLORIDE 60 MG: 20 INJECTION, SOLUTION EPIDURAL; INFILTRATION; INTRACAUDAL; PERINEURAL at 08:16

## 2022-09-09 RX ADMIN — HEPARIN SODIUM 8 UNITS/KG/HR: 10000 INJECTION, SOLUTION INTRAVENOUS at 15:25

## 2022-09-09 RX ADMIN — FENTANYL CITRATE 25 MCG: 50 INJECTION, SOLUTION INTRAMUSCULAR; INTRAVENOUS at 13:57

## 2022-09-09 RX ADMIN — FENTANYL CITRATE 50 MCG: 50 INJECTION, SOLUTION INTRAMUSCULAR; INTRAVENOUS at 13:21

## 2022-09-09 RX ADMIN — HEPARIN SODIUM 1000 UNITS: 200 INJECTION, SOLUTION INTRAVENOUS at 08:41

## 2022-09-09 RX ADMIN — SODIUM CHLORIDE 3.2 UNITS/HR: 9 INJECTION, SOLUTION INTRAVENOUS at 09:30

## 2022-09-09 RX ADMIN — EPHEDRINE SULFATE 10 MG: 5 INJECTION INTRAVENOUS at 11:58

## 2022-09-09 RX ADMIN — EPHEDRINE SULFATE 10 MG: 5 INJECTION INTRAVENOUS at 08:31

## 2022-09-09 RX ADMIN — METOPROLOL TARTRATE 100 MG: 50 TABLET, FILM COATED ORAL at 20:36

## 2022-09-09 RX ADMIN — PROPOFOL 120 MG: 10 INJECTION, EMULSION INTRAVENOUS at 08:16

## 2022-09-09 RX ADMIN — ROCURONIUM BROMIDE 10 MG: 50 INJECTION, SOLUTION INTRAVENOUS at 10:44

## 2022-09-09 RX ADMIN — ROCURONIUM BROMIDE 50 MG: 50 INJECTION, SOLUTION INTRAVENOUS at 08:16

## 2022-09-09 RX ADMIN — ROCURONIUM BROMIDE 20 MG: 50 INJECTION, SOLUTION INTRAVENOUS at 12:13

## 2022-09-09 RX ADMIN — HEPARIN SODIUM 2000 UNITS: 1000 INJECTION INTRAVENOUS; SUBCUTANEOUS at 11:19

## 2022-09-09 RX ADMIN — CEFAZOLIN 2 G: 2 INJECTION, POWDER, FOR SOLUTION INTRAMUSCULAR; INTRAVENOUS at 20:36

## 2022-09-09 RX ADMIN — CLOPIDOGREL BISULFATE 75 MG: 75 TABLET ORAL at 18:01

## 2022-09-09 RX ADMIN — METOPROLOL TARTRATE 25 MG: 25 TABLET, FILM COATED ORAL at 18:01

## 2022-09-09 RX ADMIN — SODIUM CHLORIDE, POTASSIUM CHLORIDE, SODIUM LACTATE AND CALCIUM CHLORIDE: 600; 310; 30; 20 INJECTION, SOLUTION INTRAVENOUS at 10:02

## 2022-09-09 RX ADMIN — EPHEDRINE SULFATE 10 MG: 5 INJECTION INTRAVENOUS at 10:01

## 2022-09-09 RX ADMIN — HYDROMORPHONE HYDROCHLORIDE 1 MG: 1 INJECTION, SOLUTION INTRAMUSCULAR; INTRAVENOUS; SUBCUTANEOUS at 17:04

## 2022-09-09 RX ADMIN — EPHEDRINE SULFATE 10 MG: 5 INJECTION INTRAVENOUS at 12:19

## 2022-09-09 RX ADMIN — EPHEDRINE SULFATE 10 MG: 5 INJECTION INTRAVENOUS at 10:21

## 2022-09-09 RX ADMIN — HEPARIN SODIUM 3000 UNITS: 1000 INJECTION INTRAVENOUS; SUBCUTANEOUS at 12:04

## 2022-09-09 RX ADMIN — FENTANYL CITRATE 50 MCG: 50 INJECTION, SOLUTION INTRAMUSCULAR; INTRAVENOUS at 11:40

## 2022-09-09 RX ADMIN — ONDANSETRON 4 MG: 2 INJECTION INTRAMUSCULAR; INTRAVENOUS at 14:01

## 2022-09-09 RX ADMIN — EPHEDRINE SULFATE 10 MG: 5 INJECTION INTRAVENOUS at 08:48

## 2022-09-09 RX ADMIN — Medication 100 MCG: at 12:21

## 2022-09-09 RX ADMIN — PROMETHAZINE HYDROCHLORIDE 25 MG: 25 SUPPOSITORY RECTAL at 19:40

## 2022-09-09 RX ADMIN — ROCURONIUM BROMIDE 20 MG: 50 INJECTION, SOLUTION INTRAVENOUS at 11:09

## 2022-09-09 RX ADMIN — SODIUM CHLORIDE, POTASSIUM CHLORIDE, SODIUM LACTATE AND CALCIUM CHLORIDE: 600; 310; 30; 20 INJECTION, SOLUTION INTRAVENOUS at 12:35

## 2022-09-09 RX ADMIN — EPHEDRINE SULFATE 5 MG: 5 INJECTION INTRAVENOUS at 12:09

## 2022-09-09 RX ADMIN — EPHEDRINE SULFATE 10 MG: 5 INJECTION INTRAVENOUS at 08:33

## 2022-09-09 RX ADMIN — HEPARIN SODIUM 10000 UNITS: 1000 INJECTION INTRAVENOUS; SUBCUTANEOUS at 10:15

## 2022-09-09 RX ADMIN — ONDANSETRON 8 MG: 2 INJECTION INTRAMUSCULAR; INTRAVENOUS at 16:59

## 2022-09-09 RX ADMIN — EPHEDRINE SULFATE 5 MG: 5 INJECTION INTRAVENOUS at 09:48

## 2022-09-09 RX ADMIN — ONDANSETRON 4 MG: 2 INJECTION INTRAMUSCULAR; INTRAVENOUS at 12:36

## 2022-09-09 RX ADMIN — FENTANYL CITRATE 50 MCG: 50 INJECTION, SOLUTION INTRAMUSCULAR; INTRAVENOUS at 08:16

## 2022-09-09 RX ADMIN — SODIUM CHLORIDE 100 ML/HR: 9 INJECTION, SOLUTION INTRAVENOUS at 15:27

## 2022-09-09 RX ADMIN — EPHEDRINE SULFATE 10 MG: 5 INJECTION INTRAVENOUS at 12:17

## 2022-09-09 RX ADMIN — HYDRALAZINE HYDROCHLORIDE 5 MG: 20 INJECTION, SOLUTION INTRAMUSCULAR; INTRAVENOUS at 14:01

## 2022-09-09 RX ADMIN — CEFAZOLIN 2 G: 2 INJECTION, POWDER, FOR SOLUTION INTRAMUSCULAR; INTRAVENOUS at 12:16

## 2022-09-09 RX ADMIN — FENTANYL CITRATE 50 MCG: 50 INJECTION, SOLUTION INTRAMUSCULAR; INTRAVENOUS at 08:57

## 2022-09-09 RX ADMIN — HYDROMORPHONE HYDROCHLORIDE 0.5 MG: 1 INJECTION, SOLUTION INTRAMUSCULAR; INTRAVENOUS; SUBCUTANEOUS at 14:52

## 2022-09-09 RX ADMIN — FENTANYL CITRATE 25 MCG: 50 INJECTION, SOLUTION INTRAMUSCULAR; INTRAVENOUS at 13:53

## 2022-09-09 RX ADMIN — HYDROXYZINE HYDROCHLORIDE 100 MG: 25 TABLET, FILM COATED ORAL at 20:36

## 2022-09-09 NOTE — PLAN OF CARE
Goal Outcome Evaluation:              Outcome Evaluation: Patient resting with no complaints, Heparin GTT is infusing at this time.

## 2022-09-09 NOTE — ANESTHESIA POSTPROCEDURE EVALUATION
Patient: Blossom Martin    Procedure Summary     Date: 09/09/22 Room / Location: [facility] OR 12 / [facility] MAIN OR    Anesthesia Start: 0809 Anesthesia Stop: 1323    Procedure: BILATERAL COMMON ILIAC ARTERY ANGIOPLASTY AND STENT PLACEMENT, RIGHT EXTERNAL ILIAC ARTERY ANGIOPLASTY AND STENT, RIGHT EXTERNAL ILIAC ARTERY DRUG COATED BALLOON ANGIOPLASTY AND RIGHT LOWER EXTREMITY ANGIOGRAM (Bilateral Hip) Diagnosis:     Surgeons: Shirley Kurtz MD Provider: Asher Foreman MD    Anesthesia Type: general, Lynne ASA Status: 3          Anesthesia Type: general, Colman    Vitals  Vitals Value Taken Time   /72 09/09/22 1427   Temp 97.3 °F (36.3 °C) 09/09/22 1415   Pulse 69 09/09/22 1429   Resp     SpO2 99 % 09/09/22 1429   Vitals shown include unvalidated device data.        Post Anesthesia Care and Evaluation    Patient location during evaluation: PACU  Patient participation: complete - patient participated  Level of consciousness: awake  Pain scale: See nurse's notes for pain score.  Pain management: adequate    Airway patency: patent  Anesthetic complications: No anesthetic complications  PONV Status: none  Cardiovascular status: acceptable  Respiratory status: acceptable and spontaneous ventilation  Hydration status: acceptable    Comments: Patient seen and examined postoperatively; vital signs stable; SpO2 greater than or equal to 90%; cardiopulmonary status stable; nausea/vomiting adequately controlled; pain adequately controlled; no apparent anesthesia complications; patient discharged from anesthesia care when discharge criteria were met

## 2022-09-09 NOTE — CONSULTS
PULMONARY CRITICAL CARE CONSULT NOTE      PATIENT IDENTIFICATION:  Name: Blossom Martin  MRN: [id number]  :  1965     Age: 57 y.o.  Sex: female        DATE OF CONSULTATION:  2022  PRIMARY CARE PHYSICIAN    Collin Low APRN                  CHIEF COMPLAINT: SOB    History of Present Illness:   Blossom Martin is a 57 y.o. female  With a history of CAD, DM II and HTN who came to the ER with complaints of right lower extremity pain which is worsened over the last 48 hours.  The pain is significantly worsened with walking and is described as a cramping pain in the lower extremity as well as a hot poker burning pain in the right groin.  The patient denies any known injury. Patient had a right iliac stent placed secondary to peripheral vascular disease approximately a year ago. Patient seen by CTS and  CT angiogram shows right common iliac artery stent occlusion. Today patient underwent Common iliac artery angioplasty and stent placement, right external iliac artery angioplasty and stent, right external iliac artery drug coated balloon angioplasty and RLE angiogram. Patient now transferred to ICU for further monitoring.       Review of Systems:   Constitutional: As above   Eyes: negative   ENT/oropharynx: negative   Cardiovascular: As above   Respiratory: As above   Gastrointestinal: negative   Genitourinary: negative   Neurological: negative   Musculoskeletal: negative   Integument/breast: negative   Endocrine: negative   Allergic/Immunologic: negative     Past Medical History:  Past Medical History:   Diagnosis Date   • CAD (coronary artery disease)    • Diabetes (HCC)    • Hypertension        Past Surgical History:  Past Surgical History:   Procedure Laterality Date   • ANGIOPLASTY  2022    failed peripheral angioplasty by Dr Kurtz   • ILIAC ARTERY STENT Bilateral     At [facility]   • LEFT HEART CATH          Family History:  Family History   Problem Relation Age of Onset   • No Known  "Problems Mother    • No Known Problems Father         Social History:   Social History     Tobacco Use   • Smoking status: Never Smoker   • Smokeless tobacco: Never Used   Substance Use Topics   • Alcohol use: Never        Allergies:  Allergies   Allergen Reactions   • Aspirin GI Intolerance and Other (See Comments)     Nausea and vomiting          Home Meds:  Medications Prior to Admission   Medication Sig Dispense Refill Last Dose   • buPROPion XL (FORFIVO XL) 450 MG 24 hr tablet Take 450 mg by mouth Every Morning.      • Cholecalciferol (Vitamin D3) 1.25 MG (63242 UT) capsule Take 50,000 Units by mouth Every 7 (Seven) Days.       • clopidogrel (PLAVIX) 75 MG tablet Take 75 mg by mouth Daily.      • DULoxetine (CYMBALTA) 20 MG capsule Take 40 mg by mouth Daily.      • glimepiride (AMARYL) 2 MG tablet Take 2 mg by mouth Daily.      • hydrOXYzine (ATARAX) 50 MG tablet Take 100 mg by mouth Every Night.      • isosorbide mononitrate (IMDUR) 60 MG 24 hr tablet Take 60 mg by mouth Daily.      • metoprolol tartrate (LOPRESSOR) 100 MG tablet Take 100 mg by mouth 2 (Two) Times a Day.      • SITagliptin (JANUVIA) 100 MG tablet Take 100 mg by mouth Daily.          Objective:  tMax 24 hrs: Temp (24hrs), Av.7 °F (36.5 °C), Min:96.8 °F (36 °C), Max:98.2 °F (36.8 °C)      Vitals Ranges:   Temp:  [96.8 °F (36 °C)-98.2 °F (36.8 °C)] 97.5 °F (36.4 °C)  Heart Rate:  [56-86] 86  Resp:  [10-21] 21  BP: (125-178)/() 159/65  Arterial Line BP: (138-187)/(60-77) 138/60    Intake and Output Last 3 Shifts:   I/O last 3 completed shifts:  In: 3773 [P.O.:960; I.V.:2578; Blood:135; IV Piggyback:100]  Out: 3100 [Urine:2500; Blood:600]    Exam:  /65   Pulse 86   Temp 97.5 °F (36.4 °C) (Oral)   Resp 21   Ht 160 cm (63\")   Wt 79.2 kg (174 lb 11.2 oz)   SpO2 98%   BMI 30.95 kg/m²       22   Weight: 81.2 kg (179 lb) 79.2 kg (174 lb 11.2 oz)     General Appearance:  Alert    HEENT:  " Normocephalic, without obvious abnormality, atraumatic.  Conjunctivae/corneas clear.  Normal external ear canals, Nares normal, no drainage  Neck:  Supple, symmetrical, trachea midline. No JVD.  Lungs /Chest wall:   Bilateral basal rhonchi, respirations unlabored symmetrical wall movement.     Heart:  Regular rate and rhythm, systolic murmur PMI left sternal border  Abdomen: Soft, nontender, no masses, no organomegaly.    Extremities: Trace edema, no clubbing or cyanosis        Data Review:  All labs (24hrs):   Recent Results (from the past 24 hour(s))   POC Glucose Once    Collection Time: 09/08/22  9:29 PM    Specimen: Blood   Result Value Ref Range    Glucose 279 (H) 70 - 105 mg/dL   Protime-INR    Collection Time: 09/09/22  3:02 AM    Specimen: Blood   Result Value Ref Range    Protime 10.0 9.6 - 11.7 Seconds    INR 0.97 0.93 - 1.10   Fibrinogen    Collection Time: 09/09/22  3:02 AM    Specimen: Blood   Result Value Ref Range    Fibrinogen 445 210 - 450 mg/dL   aPTT    Collection Time: 09/09/22  3:02 AM    Specimen: Blood   Result Value Ref Range    PTT 59.3 (L) 61.0 - 76.5 seconds   Basic Metabolic Panel    Collection Time: 09/09/22  3:02 AM    Specimen: Blood   Result Value Ref Range    Glucose 290 (H) 65 - 99 mg/dL    BUN 10 6 - 20 mg/dL    Creatinine 0.53 (L) 0.57 - 1.00 mg/dL    Sodium 136 136 - 145 mmol/L    Potassium 3.9 3.5 - 5.2 mmol/L    Chloride 103 98 - 107 mmol/L    CO2 22.0 22.0 - 29.0 mmol/L    Calcium 8.6 8.6 - 10.5 mg/dL    BUN/Creatinine Ratio 18.9 7.0 - 25.0    Anion Gap 11.0 5.0 - 15.0 mmol/L    eGFR 107.4 >60.0 mL/min/1.73   CBC Auto Differential    Collection Time: 09/09/22  3:02 AM    Specimen: Blood   Result Value Ref Range    WBC 12.80 (H) 3.40 - 10.80 10*3/mm3    RBC 4.76 3.77 - 5.28 10*6/mm3    Hemoglobin 13.3 12.0 - 15.9 g/dL    Hematocrit 41.3 34.0 - 46.6 %    MCV 86.8 79.0 - 97.0 fL    MCH 28.0 26.6 - 33.0 pg    MCHC 32.2 31.5 - 35.7 g/dL    RDW 14.1 12.3 - 15.4 %    RDW-SD 42.9  37.0 - 54.0 fl    MPV 8.5 6.0 - 12.0 fL    Platelets 259 140 - 450 10*3/mm3    Neutrophil % 54.1 42.7 - 76.0 %    Lymphocyte % 36.5 19.6 - 45.3 %    Monocyte % 6.2 5.0 - 12.0 %    Eosinophil % 2.4 0.3 - 6.2 %    Basophil % 0.8 0.0 - 1.5 %    Neutrophils, Absolute 6.90 1.70 - 7.00 10*3/mm3    Lymphocytes, Absolute 4.70 (H) 0.70 - 3.10 10*3/mm3    Monocytes, Absolute 0.80 0.10 - 0.90 10*3/mm3    Eosinophils, Absolute 0.30 0.00 - 0.40 10*3/mm3    Basophils, Absolute 0.10 0.00 - 0.20 10*3/mm3    nRBC 0.0 0.0 - 0.2 /100 WBC   POC Glucose Once    Collection Time: 09/09/22  7:39 AM    Specimen: Blood   Result Value Ref Range    Glucose 203 (H) 70 - 105 mg/dL   POC Glucose Once    Collection Time: 09/09/22  1:22 PM    Specimen: Blood   Result Value Ref Range    Glucose 174 (H) 70 - 105 mg/dL   POC Glucose Once    Collection Time: 09/09/22  2:25 PM    Specimen: Blood   Result Value Ref Range    Glucose 179 (H) 70 - 105 mg/dL   POC Glucose Once    Collection Time: 09/09/22  3:33 PM    Specimen: Blood   Result Value Ref Range    Glucose 177 (H) 70 - 105 mg/dL   CBC (No Diff)    Collection Time: 09/09/22  3:42 PM    Specimen: Blood   Result Value Ref Range    WBC 20.10 (H) 3.40 - 10.80 10*3/mm3    RBC 4.76 3.77 - 5.28 10*6/mm3    Hemoglobin 13.4 12.0 - 15.9 g/dL    Hematocrit 40.9 34.0 - 46.6 %    MCV 85.9 79.0 - 97.0 fL    MCH 28.1 26.6 - 33.0 pg    MCHC 32.7 31.5 - 35.7 g/dL    RDW 14.2 12.3 - 15.4 %    RDW-SD 43.3 37.0 - 54.0 fl    MPV 7.9 6.0 - 12.0 fL    Platelets 229 140 - 450 10*3/mm3   POC Glucose Once    Collection Time: 09/09/22  5:59 PM    Specimen: Blood   Result Value Ref Range    Glucose 201 (H) 70 - 105 mg/dL   aPTT    Collection Time: 09/09/22  6:19 PM    Specimen: Blood   Result Value Ref Range    PTT 35.6 (L) 61.0 - 76.5 seconds        Imaging:  Cardiac Catheterization/Vascular Study    Result Date: 9/8/2022  Preoperative Diagnosis: Peripheral arterial disease with rest pain, acute right limb ischemia  Postoperative Diagnosis: Same Procedure Performed: US guided right common femoral artery access with micropuncture US guided left common femoral artery access Aortogram Left lower extremity arteriogram US guided placement of non tunneled central line, right IJV CPT: 55943-18 Abdominal aortogram (no diagnostic quality arterial imaging prior or significant change in vascular status) 76315-92 Unilateral extremity runoff arteriogram (no diagnostic quality arterial imaging prior or significant change in vascular status) 50674 Ultrasound-guided percutaneous vascular access. . Surgeon: Shirley Kurtz MD Assistant:  None Anesthesia: sedation Estimated Blood Loss: Minimal Findings:  Unable to obtain sheath access in right common femoral artery due to vessel calcification and scar tissue in the groin.  Left common femoral artery access and sheath placed and arteriograms were obtained.  Wire access was lost during sheath exchange and hematoma was appreciated in left groin.  I decided to abort the procedure and direct pressure was held for hemostasis to the left groin for 30 mins. Implants:  none Specimen: none Complications: none Access: US guided access of bilateral common femoral arteries, right 4 Fr micropuncture, right 6 Fr Closure: Manual pressure for 30 minutes Dispo: To PACU Indication for procedure: 57 y.o. female with occluded right common iliac artery stents with acute limb ischemia of the right leg.  She presents today for arteriogram and thrombolysis initiation. Description of procedure: Patient brought to the cath lab and positioned supine on the operating table.  Timeout performed with all OR staff present and in agreement.  Right neck prepped and draped in standard sterile fashion.  Under US guidance, the right IJV was accessed with a finder needle and guidewire was advanced into the SVC.  The skin was cut then the dilator was passed over the wire and triple lumen central line was placed into the right IJV.   This was flushed with saline and secured with sutures.  Dressings placed. Bilateral groins prepped and draped in standard sterile fashion.  Under US guidance, the right common femoral artery was accessed using a micropuncture needle and wire.  The artery was calcified and there was a large amount of scar tissue in the groin.  I attempted to pass multiple micropuncture catheters and dilators into the artery but was ultimately unsuccessful in obtained right common femoral artery access.  Direct pressure was held for hemostasis.  I proceeded to obtain access on the left under US guidance.  The left common femoral artery was accessed and a micropuncture catheter was placed over the wire and an angiogram was performed that confirmed a common femoral artery puncture.  A soft 0.035 wire was advanced centrally into the infrarenal aorta, then an omniflush catheter was advanced into the infrarenal aorta.  An aortogram was obtained that demonstrated: patent renal arteries bilaterally, occlusion of the right common and external iliac artery stents with reconstitution at the distal external iliac artery, patent left common, external, hypogastric arteries. Up and over access was obtained with an omniflush catheter and we attempted to exchange for a rim catheter but were unsuccessful.  I think attempted to place a TourGuide stearable sheath in the left common femoral artery but access was lost during this maneuver.  She developed a left femoral hematoma at this time so direct pressure was held and I elected to abort the procedure.  Direct manual pressure was held on the left groin for >25 mins with improvement in the hematoma.  A stat pseudoaneurysm duplex was ordered for the post operative area.  All counts were reported as correct at the conclusion of the procedure.  Patient tolerated the procedure well without any apparent complications.  Anesthesia was reversed and patient was transferred to the PACU in stable condition.   Shirley Kurtz MD 09/08/22     CT Angio Abdominal Aorta Bilateral Iliofem Runoff    Result Date: 9/6/2022   DATE OF EXAM: 9/6/2022 3:00 PM  PROCEDURE: CT ANGIO ABDOMINAL AORTA BILAT ILIOFEM RUNOFF-  INDICATIONS: 57-year-old female with known peripheral arterial occlusive disease, with apparent prior bilateral iliac endoluminal stenting, who presents currently with right lower extremity pain and discoloration.  COMPARISON: CT abdomen and pelvis, 12/10/2018.  TECHNIQUE: Routine transaxial slices were obtained through the abdomen, pelvis, and both lower extremities after the intravenous administration of 100 cc Isovue 370. Reconstructed coronal and sagittal images were also obtained. In addition, a 3 D volume rendered image was obtained after post processing. Automated exposure control and iterative reconstruction methods were used.  FINDINGS: Nonvascular findings-included portions of each lung base are very grossly unremarkable. There are degenerative changes present throughout the spine and pelvis. The gallbladder surgically absent. There is suggestion of hepatic steatosis. The spleen, pancreas, adrenal glands and kidneys appear grossly unremarkable otherwise. CT evaluation unopacified bowel is limited. There is questionable mild bladder wall thickening. The uterus is surgically absent. There is a small fat-containing umbilical hernia noted.  Vascular findings-the abdominal aorta is patent, with moderate grade atheromatous calcific plaquing throughout its infrarenal segment as well as tapering.. There is complete occlusion of the previously stented right common iliac artery, age indeterminate. There is reconstitution at the origin of the right external iliac artery which itself is moderately diseased and narrowed. The right external iliac artery stent appears patent. The right hypogastric artery is patent with ostial stenosis. An additional stenosis is present just distal to the external iliac stent which is  moderate to high-grade in severity. The right common femoral artery is patent as is its bifurcation with a focal moderate grade stenosis within the mid common femoral noted. The right profunda femoral artery is patent. The right superficial femoral artery is patent throughout its course with scattered mild disease throughout and calcific plaque noted distally. The right popliteal artery is patent with mild disease in its midportion. There is moderate plaquing involving the terminus of the right popliteal artery into its bifurcation, and single vessel runoff below the right knee accomplished by the posterior tibial artery. The tibioperoneal trunk is patent. The peroneal artery is patent to the mid lower leg and occludes. The anterior tibial artery is patent proximally then occludes in the proximal to mid lower leg region. These occlusions are also age-indeterminate. The left common iliac artery demonstrates moderate calcific disease and has been previously stented. The proximal most aspect of the stent extends into the aortic terminus alongside the right common iliac stent. The remainder the common iliac artery is patent and there is a moderate grade stenosis involving the mid left external iliac artery. The hypogastric artery is patent with a moderate to high-grade ostial stenosis identified and suggested. The remainder of the left external iliac is patent. The left common femoral artery is patent. The left superficial femoral artery is diminutive in caliber in its proximal most portion for several centimeters, then occludes. The left profunda femoral artery is hypertrophied and grossly patent. There is reconstitution of the distal left SFA which itself is moderately diseased. The left popliteal artery is patent with mild disease and there is suggestion of single vessel runoff accomplished via the peroneal artery. The left posterior tibial artery tapers distally and may occluded. The left anterior tibial artery is  patent just beyond its origin for a few centimeters then occludes. There is a patent single right main renal artery. The left main renal artery is patent with a moderate grade proximal/ostial stenosis estimated to be 50% diameter range extending for approximately just below 1 cm in length. The celiac trunk and superior mesenteric artery are both patent with some ostial disease which is largely mild. The inferior mesenteric artery is patent as well.       1. Complete occlusion, previously stented right common iliac artery, age indeterminate, but potentially acute. 2. Multifocal stenoses involving the right external iliac and common femoral arteries. 3. Single vessel infrapopliteal runoff on the right by the posterior tibial artery. Embolic occlusion of the of the vessels is not excluded. 4. Moderate grade stenosis, mid left external iliac artery. 5. Long segment occlusion, left superficial femoral artery, likely chronic. 6. Moderate grade proximal left renal arterial stenosis. 7. Hepatic steatosis. 8. Numerous additional findings, both vascular and nonvascular, as described above in greater detail.  Electronically Signed By-Nohemy Steve MD On:9/6/2022 4:30 PM This report was finalized on [id number] by  Nohemy Steve MD.    XR Chest 1 View    Result Date: 9/8/2022  DATE OF EXAM: 9/8/2022 11:20 AM  PROCEDURE: XR CHEST 1 VW-  INDICATIONS: s/p central line placement, eval for pneumothorax; T82.868A-Thrombosis due to vascular prosthetic devices, implants and grafts, initial encounter; M79.604-Pain in right leg; I70.209-Unspecified atherosclerosis of native arteries of extremities, unspecified extremity; N30.01-Acute cystitis with hematuria   COMPARISON: None  TECHNIQUE: Single radiographic view of the chest was obtained.  FINDINGS: No acute airspace disease. Normal heart size. No pleural effusion or pneumothorax is identified. Right internal jugular central line tip extends to the right atrial level. There is no  visible pneumothorax. No acute osseous abnormalities are identified.       1. The right neck approach central line tip terminates at the level of the right atrium. If placement is desired at the level of the cavoatrial junction, it could be retracted approximately 4 cm. 2. No pneumothorax.  Electronically Signed By-Myranda Briones MD On:9/8/2022 11:37 AM This report was finalized on [id number] by  Myranda Briones MD.    Duplex Pseudoaneurysm CAR    Result Date: 9/8/2022  · No evidence of pseudoaneurysm or AV fistula in the left groin. · Left FSA occlusion noted.      Arterial Line    Result Date: 9/9/2022  Asher Foreman MD     9/9/2022  8:42 AM Arterial Line Patient reassessed immediately prior to procedure Patient location during procedure: OR Line placed for hemodynamic monitoring and ABGs/Labs/ISTAT. Performed By Anesthesiologist: Asher Foreman MD Preanesthetic Checklist Completed: patient identified, IV checked, site marked, risks and benefits discussed, surgical consent, monitors and equipment checked, pre-op evaluation and timeout performed Arterial Line Prep Sterile Tech: cap, gloves and mask Prep: ChloraPrep Patient monitoring: blood pressure monitoring, continuous pulse oximetry and EKG Arterial Line Procedure Laterality:left Location:  radial artery Catheter size: 20 G Guidance: ultrasound guided, landmark technique and palpation technique Successful placement: yes Heparin (Porcine) in NaCl 1000-0.9 UT/500ML-% for arterial line pressure bag, 1,000 Units Post Assessment Dressing Type: occlusive dressing applied, secured with tape and wrist guard applied. Complications no Circ/Move/Sens Assessment: unchanged. Patient Tolerance: patient tolerated the procedure well with no apparent complications Additional Notes Pre-procedure: Patient identified; pre-procedure vital signs, all relevant labs/studies, complete medical/surgical/anesthetic history, full medication list, full allergy list, and  NPO status obtained/reviewed; physical assessment performed; anesthetic options, side effects, potential complications, risks, and benefits discussed; questions answered; written anesthesia procedure consent obtained; patient cleared for procedure; IV access in situ Procedure: Arterial line placed after induction of general anesthesia; ASA monitor placed; patient positioned; hand hygiene performed; sterile technique maintained throughout the procedure; sterile prep and drape applied; insertion site determined by anatomical landmarks, palpation, and ultrasound imaging; live ultrasound guidance throughout the procedure; target artery identified on live ultrasound; target artery is patent with flow; needle placed into the skin and advanced into the target artery with correct placement confirmed by return of arterial blood; realtime needle entry into the target artery witnessed on live ultrasound; wire slide into the target artery; arterial catheter threaded into the target artery over the needle/wire; needle/wire removed; correct catheter placement confirmed by transducing systemic arterial blood pressure; catheter secured with occlusive dressing and tape Post-procedure: Arterial catheter placed successfully; no apparent complications; vital signs stable throughout; minimal estimated blood loss        ASSESSMENT:  Arterial stent thrombosis  DM II  CAD  HTN  Right leg pain  Anxiety  PAD       PLAN:  Monitor BP's closely   Monitor pulse   Plavix  IV fluids  Monitor hematoma  Incentive spirometer  Electrolytes/ glycemic control  DVT and GI prophylaxis.    Discussed with Dr. Leda Vega, APRN   9/9/2022  19:40 EDT      I personally have examined  and interviewed the patient. I have reviewed the history, data, problems, assessment and plan with our NP.  Total Critical care time in direct medical management (   ) minutes, This time specifically excludes time spent performing procedures.    Taylor Tompkins MD    9/9/2022  22:12 EDT

## 2022-09-09 NOTE — PLAN OF CARE
Goal Outcome Evaluation:  Plan of Care Reviewed With: patient        Progress: no change  Outcome Evaluation: To ICU after vascular stenting. BP labile via A-line. Much lower via cuff. Unable to dopper RLE DP pulse, MD aware. Severe nausea despite intervention. Meds per MAR. Will CTM

## 2022-09-09 NOTE — ANESTHESIA PROCEDURE NOTES
Arterial Line      Patient reassessed immediately prior to procedure    Patient location during procedure: OR   Line placed for hemodynamic monitoring and ABGs/Labs/ISTAT.  Performed By   Anesthesiologist: Asher Foreman MD  Preanesthetic Checklist  Completed: patient identified, IV checked, site marked, risks and benefits discussed, surgical consent, monitors and equipment checked, pre-op evaluation and timeout performed  Arterial Line Prep   Sterile Tech: cap, gloves and mask  Prep: ChloraPrep  Patient monitoring: blood pressure monitoring, continuous pulse oximetry and EKG  Arterial Line Procedure   Laterality:left  Location:  radial artery  Catheter size: 20 G   Guidance: ultrasound guided, landmark technique and palpation technique  Successful placement: yes  Heparin (Porcine) in NaCl 1000-0.9 UT/500ML-% for arterial line pressure bag, 1,000 Units  Post Assessment   Dressing Type: occlusive dressing applied, secured with tape and wrist guard applied.   Complications no  Circ/Move/Sens Assessment: unchanged.   Patient Tolerance: patient tolerated the procedure well with no apparent complications  Additional Notes  Pre-procedure:  Patient identified; pre-procedure vital signs, all relevant labs/studies, complete medical/surgical/anesthetic history, full medication list, full allergy list, and NPO status obtained/reviewed; physical assessment performed; anesthetic options, side effects, potential complications, risks, and benefits discussed; questions answered; written anesthesia procedure consent obtained; patient cleared for procedure; IV access in situ    Procedure:  Arterial line placed after induction of general anesthesia; ASA monitor placed; patient positioned; hand hygiene performed; sterile technique maintained throughout the procedure; sterile prep and drape applied; insertion site determined by anatomical landmarks, palpation, and ultrasound imaging; live ultrasound guidance throughout the  procedure; target artery identified on live ultrasound; target artery is patent with flow; needle placed into the skin and advanced into the target artery with correct placement confirmed by return of arterial blood; realtime needle entry into the target artery witnessed on live ultrasound; wire slide into the target artery; arterial catheter threaded into the target artery over the needle/wire; needle/wire removed; correct catheter placement confirmed by transducing systemic arterial blood pressure; catheter secured with occlusive dressing and tape    Post-procedure:  Arterial catheter placed successfully; no apparent complications; vital signs stable throughout; minimal estimated blood loss

## 2022-09-09 NOTE — OP NOTE
Date of Admission:  9/6/2022 09/09/22  Shirley Kurtz MD  [facility]    Preoperative Diagnosis:   Peripheral arterial disease with rest pain, acute limb ischemia of the right lower extremity    Postoperative Diagnosis:   Same    Procedure Performed:   1) aortogram with right lower extremity runoff  2) angioplasty and stenting of bilateral common iliac arteries with 8 x 79 mm VBX stents  3) angioplasty and stenting of right common and external iliac arteries with 7x 59 VBX stent  4) drug coated balloon angioplasty of the right external iliac artery  5) open exposure of bilateral common femoral arteries for delivery of covered endoprostheses    CPT:  10583 Iliac PTA additional ipsilateral  59959-92 Iliac PTA initial bilateral  25071-48 Iliac PTA additional bilateral  67028 Iliac stent initial  28155 Iliac stent additional ipsilateral  85045-17 Abdominal aortogram (no diagnostic quality arterial imaging prior or significant change in vascular status)  96210-64 Unilateral extremity runoff arteriogram (no diagnostic quality arterial imaging prior or significant change in vascular status)  37345 Aortic catheter placement  .    Surgeon:   Shirley Kurtz MD    Assistant:    Willard Martel MD , Provided critical assistance in exposure, retraction, and suction that overall decrease blood loss and operative time.    Anesthesia:   General    Estimated Blood Loss:   100-250 cc    Findings:    Closure device in place on anterior surface of right common femoral artery, otherwise artery soft but small caliber.  No pseudoaneurysm or hematoma appreciated.  Left common femoral artery with anterior puncture site, small hematoma, no pseudoaneurysm.  Left SFA occluded.  Right leg runoff via posterior tibial artery to foot.    Implants:    Implant Name Type Inv. Item Serial No.  Lot No. LRB No. Used Action   CLIPAPPLR M/ ENDO LIGACLIP 20CLP 11IN MD - [id number] Implant CLIPAPPLR M/ ENDO LIGACLIP 20CLP 11IN MD  [employer] [id number] N/A 1 Implanted   CLIPAPPLR M/ ENDO LIGACLIP 9 3/8IN SM - [id number] Implant CLIPAPPLR M/ ENDO LIGACLIP 9 3/8IN SM  [employer] [id number] N/A 1 Implanted   STENTGR ENDOPROSTH VIABAHN VBX EXP 8F [id number] 135CM - [id number] - [id number] Implant STENTGR ENDOPROSTH VIABAHN VBX EXP 8F [id number] 135CM [id number] [employer] [id number] Left 1 Implanted   STENTGR ENDOPROSTH VIABAHN VBX EXP 8F [id number] 135CM - [id number] - [id number] Implant STENTGR ENDOPROSTH VIABAHN VBX EXP 8F [id number] 135CM [id number] [employer] [id number] Right 1 Implanted   STENTGR ENDOPROSTH VIABAHN VBX EXP 7F [id number] 135CM - [id number] - [id number] Implant STENTGR ENDOPROSTH VIABAHN VBX EXP 7F [id number] 135CM [id number] [employer]  Right 1 Implanted   HEMOST ABS SURGICEL FIBRILLAR 4X4IN - [id number] Implant HEMOST ABS SURGICEL FIBRILLAR 4X4IN  [employer] [id number] Right 1 Implanted       Specimen:   none    Complications:   none    Access:  Open bilateral common femoral artery exposure    Closure:  open repair of bilateral common femoral arteries    Dispo:  To PACU    Indication for procedure:   57 y.o. female with subacute onset of short distance right leg claudication with occlusion of her indwelling right common iliac artery stent.  Attempt at thrombolysis yesterday was unsuccessful.  She presents for open revascularization.      Description of procedure:   Patient brought to the operating room and positioned supine on the operating table.  Timeout performed with all OR staff present and in agreement.  Appropriate perioperative antibiotics were given prior to procedure start.  Bilateral groins prepped and draped in standard sterile fashion.  Transverse incision was made over the right groin.  Dissection was carried down through the subcutaneous tissues to the femoral sheath, which was opened longitudinally.  Crossing vein branches were ligated then the common  femoral artery, superficial femoral artery, and profundofemoral artery were dissected free circumferentially and encircled with Vesseloops.  There was evidence of a closure device on the anterior surface of the common femoral artery.  Otherwise, the artery appeared normal and was soft.  The common femoral artery itself was quite short, with only about 8 mm between the profunda femoral takeoff and the circumflex iliac arteries.  Then I proceeded to make a transverse incision over the left groin and in the similar fashion expose the common femoral artery, superficial femoral artery, profundofemoral artery.  There was evidence of a puncture site on the anterior surface of the common femoral artery with a small hematoma subcutaneous tissues but no pseudoaneurysm.  There was no closure device on the side as a did not use one yesterday and I did not find evidence of one that been used in the past.  These were all controlled with Vesseloops.  Using a micropuncture needle, the common femoral arteries were accessed, then micropuncture wires micropuncture catheters were placed into the arteries.  An 035 Glidewire was advanced into the infrarenal aorta and 8 [place] sheaths were placed bilaterally.  A pigtail catheter was placed in the infrarenal aorta from the left common femoral artery and aortogram was obtained.  This demonstrated bilateral patent renal arteries, occlusion of the right common iliac artery indwelling stent with reconstitution in the distal external iliac artery.  The right hypogastric artery appeared patent.  The right external iliac artery was small in caliber.  Patient was chemically heparinized and ACT of greater than 250 and maintained at that level during the case.  Bilateral glide advantage wires were placed into the infrarenal aorta.  We proceeded to predilate with a 7 x 80 mm balloons and bilateral common iliac arteries.  Then we placed a 8 x 79 VBX stents and bilateral common iliac arteries in a  kissing fashion.  An angiogram was obtained that demonstrated poor filling of the right general iliac artery due to thrombus.  We extended the stent on the right with a 7 x 59 VBX stent from the common iliac artery into the external iliac artery.    The distal right external iliac artery was of small caliber but was patent.  This was angioplastied with a 5 mm drug-coated balloon that was inflated for 3 minutes.  The sheath removed and transverse arteriotomies were made.  There was sluggish backbleeding from bilateral superficial femoral arteries so #3 [place] was advanced distally into bilateral superficial femoral arteries with return of no clot bilaterally.  The [place] was also advanced into the right profundofemoral artery with return of no clot.  The common femoral arteries were repaired primarily with Prolene sutures.  There was a left posterior tibial signal present at the foot.  There were not any signals present in the right foot at this time.  A right lower extremity angiogram was obtained through a micropuncture catheter in the common femoral artery that demonstrated patent profundofemoral artery, patent superficial femoral artery with evidence of chronic severe stenosis in the distal superficial femoral artery, patent popliteal artery, occlusion of the proximal anterior tibial artery, and runoff via the posterior tibial artery and peroneal artery.  The posterior tibial artery continued to the right foot.  The catheter was removed and the puncture site was primarily repaired with Prolene sutures.  Heparinization was not reversed and the incisions were irrigated.  Hemostasis was achieved.  Incisions were closed in layers with glue for the skin.  There were posterior tibial signals present in bilateral feet at the conclusion of the procedure.    All counts were reported as correct at the conclusion of the procedure.  Patient tolerated the procedure well without any apparent complications.  Anesthesia was  reversed and patient was transferred to the PACU in stable condition.      Shirley Kurtz MD  09/09/22    Active Hospital Problems    Diagnosis  POA   • **Right leg pain [M79.604]  Yes   • CAD (coronary artery disease) [I25.10]  Yes   • Hypertension [I10]  Yes   • Anxiety disorder [F41.9]  Yes   • Arterial stent thrombosis, initial encounter ([facility]) [T82.868A]  Yes   • Diabetes mellitus ([facility]) [E11.9]  Yes      Resolved Hospital Problems   No resolved problems to display.

## 2022-09-09 NOTE — ANESTHESIA PROCEDURE NOTES
Airway  Urgency: elective    Date/Time: 9/9/2022 8:17 AM  Airway not difficult    General Information and Staff    Patient location during procedure: OR  Anesthesiologist: Asher Foreman MD  CRNA/CAA: Anna Marie Duffy CRNA    Indications and Patient Condition  Indications for airway management: airway protection    Preoxygenated: yes  MILS maintained throughout  Mask difficulty assessment: 1 - vent by mask    Final Airway Details  Final airway type: endotracheal airway      Successful airway: ETT  Cuffed: yes   Successful intubation technique: direct laryngoscopy  Facilitating devices/methods: intubating stylet  Endotracheal tube insertion site: oral  Blade: Duong  Blade size: 2  ETT size (mm): 7.0  Cormack-Lehane Classification: grade IIb - view of arytenoids or posterior of glottis only  Placement verified by: chest auscultation and capnometry   Cuff volume (mL): 5  Measured from: lips  ETT/EBT  to lips (cm): 21  Number of attempts at approach: 1  Assessment: lips, teeth, and gum same as pre-op and atraumatic intubation

## 2022-09-09 NOTE — SIGNIFICANT NOTE
The patient has been transferred to ICU post-operatively. Further care is deferred to the intensivist, and I will sign off. We will be happy to resume care when the patient transfer out of the ICU. Please call for a hospitalist consult at that time.    Electronically signed by Gaby Evangelista MD, 09/09/22, 4:38 PM EDT.

## 2022-09-10 LAB
ACT BLD: 150 SECONDS (ref 89–137)
ACT BLD: 248 SECONDS (ref 89–137)
ACT BLD: 248 SECONDS (ref 89–137)
ACT BLD: 300 SECONDS (ref 89–137)
ANION GAP SERPL CALCULATED.3IONS-SCNC: 9 MMOL/L (ref 5–15)
APTT PPP: 60.6 SECONDS (ref 61–76.5)
APTT PPP: 70.6 SECONDS (ref 61–76.5)
APTT PPP: 77.2 SECONDS (ref 61–76.5)
BASE DEFICIT: ABNORMAL
BASE DEFICIT: ABNORMAL
BASE EXCESS BLDA CALC-SCNC: 1 MMOL/L (ref 0–3)
BASE EXCESS BLDA CALC-SCNC: <0 MMOL/L (ref 0–3)
BUN SERPL-MCNC: 5 MG/DL (ref 6–20)
BUN/CREAT SERPL: 12.2 (ref 7–25)
CA-I BLDA-SCNC: 1.19 MMOL/L (ref 1.12–1.32)
CA-I BLDA-SCNC: 1.2 MMOL/L (ref 1.12–1.32)
CALCIUM SPEC-SCNC: 8.7 MG/DL (ref 8.6–10.5)
CHLORIDE SERPL-SCNC: 108 MMOL/L (ref 98–107)
CO2 BLDA-SCNC: 25 MMOL/L (ref 23–27)
CO2 BLDA-SCNC: 28 MMOL/L (ref 23–27)
CO2 SERPL-SCNC: 23 MMOL/L (ref 22–29)
CREAT SERPL-MCNC: 0.41 MG/DL (ref 0.57–1)
DEPRECATED RDW RBC AUTO: 42.9 FL (ref 37–54)
EGFRCR SERPLBLD CKD-EPI 2021: 114.2 ML/MIN/1.73
ERYTHROCYTE [DISTWIDTH] IN BLOOD BY AUTOMATED COUNT: 14 % (ref 12.3–15.4)
GLUCOSE BLDC GLUCOMTR-MCNC: 139 MG/DL (ref 70–105)
GLUCOSE BLDC GLUCOMTR-MCNC: 161 MG/DL (ref 70–105)
GLUCOSE BLDC GLUCOMTR-MCNC: 179 MG/DL (ref 70–105)
GLUCOSE BLDC GLUCOMTR-MCNC: 189 MG/DL (ref 70–105)
GLUCOSE BLDC GLUCOMTR-MCNC: 218 MG/DL (ref 70–105)
GLUCOSE BLDC GLUCOMTR-MCNC: 218 MG/DL (ref 70–105)
GLUCOSE BLDC GLUCOMTR-MCNC: 232 MG/DL (ref 70–105)
GLUCOSE SERPL-MCNC: 162 MG/DL (ref 65–99)
HCO3 BLDA-SCNC: 24 MMOL/L (ref 22–26)
HCO3 BLDA-SCNC: 26.7 MMOL/L (ref 22–26)
HCT VFR BLD AUTO: 36 % (ref 34–46.6)
HCT VFR BLDA CALC: 36 % (ref 38–51)
HCT VFR BLDA CALC: 37 % (ref 38–51)
HGB BLD-MCNC: 11.9 G/DL (ref 12–15.9)
HGB BLDA-MCNC: 12.2 G/DL (ref 12–17)
HGB BLDA-MCNC: 12.6 G/DL (ref 12–17)
MCH RBC QN AUTO: 28.3 PG (ref 26.6–33)
MCHC RBC AUTO-ENTMCNC: 33 G/DL (ref 31.5–35.7)
MCV RBC AUTO: 85.7 FL (ref 79–97)
PCO2 BLDA: 45.2 MM HG (ref 35–45)
PCO2 BLDA: 48.6 MM HG (ref 35–45)
PH BLDA: 7.33 PH UNITS (ref 7.35–7.45)
PH BLDA: 7.35 PH UNITS (ref 7.35–7.45)
PLATELET # BLD AUTO: 219 10*3/MM3 (ref 140–450)
PMV BLD AUTO: 8.1 FL (ref 6–12)
PO2 BLDA: 177 MM HG (ref 80–105)
PO2 BLDA: 405 MM HG (ref 80–105)
POTASSIUM BLDA-SCNC: 3.8 MMOL/L (ref 3.5–4.9)
POTASSIUM BLDA-SCNC: 3.8 MMOL/L (ref 3.5–4.9)
POTASSIUM SERPL-SCNC: 3.4 MMOL/L (ref 3.5–5.2)
POTASSIUM SERPL-SCNC: 4.1 MMOL/L (ref 3.5–5.2)
RBC # BLD AUTO: 4.2 10*6/MM3 (ref 3.77–5.28)
SAO2 % BLDCOA: 100 % (ref 95–98)
SAO2 % BLDCOA: 99 % (ref 95–98)
SODIUM BLD-SCNC: 136 MMOL/L (ref 138–146)
SODIUM BLD-SCNC: 137 MMOL/L (ref 138–146)
SODIUM SERPL-SCNC: 140 MMOL/L (ref 136–145)
WBC NRBC COR # BLD: 16.3 10*3/MM3 (ref 3.4–10.8)

## 2022-09-10 PROCEDURE — 63710000001 INSULIN GLARGINE PER 5 UNITS: Performed by: STUDENT IN AN ORGANIZED HEALTH CARE EDUCATION/TRAINING PROGRAM

## 2022-09-10 PROCEDURE — 25010000002 HEPARIN (PORCINE) 25000-0.45 UT/250ML-% SOLUTION: Performed by: STUDENT IN AN ORGANIZED HEALTH CARE EDUCATION/TRAINING PROGRAM

## 2022-09-10 PROCEDURE — 80048 BASIC METABOLIC PNL TOTAL CA: CPT | Performed by: STUDENT IN AN ORGANIZED HEALTH CARE EDUCATION/TRAINING PROGRAM

## 2022-09-10 PROCEDURE — 84132 ASSAY OF SERUM POTASSIUM: CPT | Performed by: INTERNAL MEDICINE

## 2022-09-10 PROCEDURE — 85730 THROMBOPLASTIN TIME PARTIAL: CPT | Performed by: INTERNAL MEDICINE

## 2022-09-10 PROCEDURE — 82962 GLUCOSE BLOOD TEST: CPT

## 2022-09-10 PROCEDURE — 85730 THROMBOPLASTIN TIME PARTIAL: CPT | Performed by: STUDENT IN AN ORGANIZED HEALTH CARE EDUCATION/TRAINING PROGRAM

## 2022-09-10 PROCEDURE — 25010000002 HYDROMORPHONE 1 MG/ML SOLUTION: Performed by: NURSE PRACTITIONER

## 2022-09-10 PROCEDURE — 25010000002 CEFAZOLIN PER 500 MG: Performed by: NURSE PRACTITIONER

## 2022-09-10 PROCEDURE — 63710000001 INSULIN LISPRO (HUMAN) PER 5 UNITS: Performed by: NURSE PRACTITIONER

## 2022-09-10 PROCEDURE — 85027 COMPLETE CBC AUTOMATED: CPT | Performed by: STUDENT IN AN ORGANIZED HEALTH CARE EDUCATION/TRAINING PROGRAM

## 2022-09-10 PROCEDURE — 99232 SBSQ HOSP IP/OBS MODERATE 35: CPT | Performed by: INTERNAL MEDICINE

## 2022-09-10 RX ORDER — POTASSIUM CHLORIDE 20 MEQ/1
40 TABLET, EXTENDED RELEASE ORAL AS NEEDED
Status: DISCONTINUED | OUTPATIENT
Start: 2022-09-10 | End: 2022-09-12 | Stop reason: HOSPADM

## 2022-09-10 RX ORDER — POTASSIUM CHLORIDE 1.5 G/1.77G
40 POWDER, FOR SOLUTION ORAL AS NEEDED
Status: DISCONTINUED | OUTPATIENT
Start: 2022-09-10 | End: 2022-09-12 | Stop reason: HOSPADM

## 2022-09-10 RX ORDER — POTASSIUM CHLORIDE 7.45 MG/ML
10 INJECTION INTRAVENOUS
Status: DISCONTINUED | OUTPATIENT
Start: 2022-09-10 | End: 2022-09-12 | Stop reason: HOSPADM

## 2022-09-10 RX ORDER — HEPARIN SODIUM 10000 [USP'U]/100ML
18 INJECTION, SOLUTION INTRAVENOUS
Status: DISCONTINUED | OUTPATIENT
Start: 2022-09-10 | End: 2022-09-10

## 2022-09-10 RX ORDER — MAGNESIUM SULFATE HEPTAHYDRATE 40 MG/ML
2 INJECTION, SOLUTION INTRAVENOUS AS NEEDED
Status: DISCONTINUED | OUTPATIENT
Start: 2022-09-10 | End: 2022-09-12 | Stop reason: HOSPADM

## 2022-09-10 RX ORDER — AMLODIPINE BESYLATE 5 MG/1
5 TABLET ORAL
Status: DISCONTINUED | OUTPATIENT
Start: 2022-09-10 | End: 2022-09-12 | Stop reason: HOSPADM

## 2022-09-10 RX ORDER — MAGNESIUM SULFATE 1 G/100ML
1 INJECTION INTRAVENOUS AS NEEDED
Status: DISCONTINUED | OUTPATIENT
Start: 2022-09-10 | End: 2022-09-12 | Stop reason: HOSPADM

## 2022-09-10 RX ADMIN — INSULIN LISPRO 5 UNITS: 100 INJECTION, SOLUTION INTRAVENOUS; SUBCUTANEOUS at 16:21

## 2022-09-10 RX ADMIN — INSULIN LISPRO 3 UNITS: 100 INJECTION, SOLUTION INTRAVENOUS; SUBCUTANEOUS at 22:03

## 2022-09-10 RX ADMIN — HYDROMORPHONE HYDROCHLORIDE 1 MG: 1 INJECTION, SOLUTION INTRAMUSCULAR; INTRAVENOUS; SUBCUTANEOUS at 20:18

## 2022-09-10 RX ADMIN — HYDROMORPHONE HYDROCHLORIDE 1 MG: 1 INJECTION, SOLUTION INTRAMUSCULAR; INTRAVENOUS; SUBCUTANEOUS at 04:34

## 2022-09-10 RX ADMIN — INSULIN GLARGINE 20 UNITS: 100 INJECTION, SOLUTION SUBCUTANEOUS at 21:56

## 2022-09-10 RX ADMIN — BUPROPION HYDROCHLORIDE 450 MG: 150 TABLET, EXTENDED RELEASE ORAL at 07:35

## 2022-09-10 RX ADMIN — HEPARIN SODIUM 16 UNITS/KG/HR: 10000 INJECTION, SOLUTION INTRAVENOUS at 11:42

## 2022-09-10 RX ADMIN — INSULIN LISPRO 5 UNITS: 100 INJECTION, SOLUTION INTRAVENOUS; SUBCUTANEOUS at 11:23

## 2022-09-10 RX ADMIN — METOPROLOL TARTRATE 100 MG: 50 TABLET, FILM COATED ORAL at 20:18

## 2022-09-10 RX ADMIN — Medication 10 ML: at 07:36

## 2022-09-10 RX ADMIN — CLOPIDOGREL BISULFATE 75 MG: 75 TABLET ORAL at 07:36

## 2022-09-10 RX ADMIN — APIXABAN 2.5 MG: 2.5 TABLET, FILM COATED ORAL at 20:17

## 2022-09-10 RX ADMIN — CEFAZOLIN 2 G: 2 INJECTION, POWDER, FOR SOLUTION INTRAMUSCULAR; INTRAVENOUS at 04:21

## 2022-09-10 RX ADMIN — POTASSIUM CHLORIDE 40 MEQ: 1500 TABLET, EXTENDED RELEASE ORAL at 06:01

## 2022-09-10 RX ADMIN — HYDROCODONE BITARTRATE AND ACETAMINOPHEN 1 TABLET: 7.5; 325 TABLET ORAL at 11:22

## 2022-09-10 RX ADMIN — HYDROMORPHONE HYDROCHLORIDE 1 MG: 1 INJECTION, SOLUTION INTRAMUSCULAR; INTRAVENOUS; SUBCUTANEOUS at 11:23

## 2022-09-10 RX ADMIN — HYDROXYZINE HYDROCHLORIDE 100 MG: 25 TABLET, FILM COATED ORAL at 20:17

## 2022-09-10 RX ADMIN — DULOXETINE HYDROCHLORIDE 40 MG: 20 CAPSULE, DELAYED RELEASE ORAL at 07:36

## 2022-09-10 RX ADMIN — HYDROCODONE BITARTRATE AND ACETAMINOPHEN 1 TABLET: 7.5; 325 TABLET ORAL at 07:36

## 2022-09-10 RX ADMIN — POTASSIUM CHLORIDE 40 MEQ: 1500 TABLET, EXTENDED RELEASE ORAL at 09:50

## 2022-09-10 RX ADMIN — HYDROCODONE BITARTRATE AND ACETAMINOPHEN 1 TABLET: 7.5; 325 TABLET ORAL at 15:45

## 2022-09-10 RX ADMIN — METOPROLOL TARTRATE 100 MG: 50 TABLET, FILM COATED ORAL at 07:36

## 2022-09-10 RX ADMIN — CEFAZOLIN 2 G: 2 INJECTION, POWDER, FOR SOLUTION INTRAMUSCULAR; INTRAVENOUS at 11:22

## 2022-09-10 RX ADMIN — HYDROMORPHONE HYDROCHLORIDE 1 MG: 1 INJECTION, SOLUTION INTRAMUSCULAR; INTRAVENOUS; SUBCUTANEOUS at 01:22

## 2022-09-10 RX ADMIN — ISOSORBIDE MONONITRATE 60 MG: 60 TABLET, EXTENDED RELEASE ORAL at 07:35

## 2022-09-10 RX ADMIN — Medication 10 ML: at 20:18

## 2022-09-10 NOTE — PROGRESS NOTES
"PULMONARY CRITICAL CARE PROGRESS  NOTE      PATIENT IDENTIFICATION:  Name: Blossom Martin  MRN: [id number]  :  1965     Age: 57 y.o.  Sex: female    DATE OF Note:  9/10/2022   Referring Physician: No admitting provider for patient encounter.                  Subjective:   Feeling better,   Over night still labile BP    no SOB, no chest pain, no nausea or vomiting, no change in bowel habit, no dysuria,  no new  skin rash or itching.      Objective:  tMax 24 hrs: Temp (24hrs), Av °F (36.7 °C), Min:96.8 °F (36 °C), Max:99.2 °F (37.3 °C)      Vitals Ranges:   Temp:  [96.8 °F (36 °C)-99.2 °F (37.3 °C)] 99.2 °F (37.3 °C)  Heart Rate:  [63-90] 65  Resp:  [21] 21  BP: (131-179)/(42-83) 148/66  Arterial Line BP: (138-203)/(53-87) 161/68    Intake and Output Last 3 Shifts:   I/O last 3 completed shifts:  In: 4861 [P.O.:600; I.V.:4026; Blood:135; IV Piggyback:100]  Out: 4700 [Urine:4100; Blood:600]    Exam:  /66   Pulse 65   Temp 99.2 °F (37.3 °C) (Oral)   Resp 21   Ht 160 cm (63\")   Wt 82.7 kg (182 lb 5.1 oz)   SpO2 96%   BMI 32.30 kg/m²     General Appearance:   AA  HEENT:  Normocephalic, without obvious abnormality. Conjunctivae/corneas clear.  Normal external ear canals. Nares normal, no drainage     Neck:  Supple, symmetrical, trachea midline. No JVD.  Lungs /Chest wall:   Bilateral basal rhonchi, respirations unlabored, symmetrical wall movement.     Heart:  Regular rate and rhythm, systolic murmur PMI left sternal border  Abdomen: Soft, nontender, no masses, no organomegaly.    Extremities: Trace edema, no clubbing or cyanosis        Medications:    Current Facility-Administered Medications:   •  acetaminophen (TYLENOL) tablet 650 mg, 650 mg, Oral, Q4H PRN **OR** acetaminophen (TYLENOL) 160 MG/5ML solution 650 mg, 650 mg, Oral, Q4H PRN **OR** acetaminophen (TYLENOL) suppository 650 mg, 650 mg, Rectal, Q4H PRN, Prateek Nixon APRN  •  buPROPion XL (WELLBUTRIN XL) 24 hr tablet 450 mg, 450 mg, " Oral, QAM, Shirley Kurtz MD, 450 mg at 09/10/22 0735  •  clopidogrel (PLAVIX) tablet 75 mg, 75 mg, Oral, Daily, Shirley Kurtz MD, 75 mg at 09/10/22 0736  •  dextrose (D50W) (25 g/50 mL) IV injection 25 g, 25 g, Intravenous, Q15 Min PRN, Shirley Kurtz MD  •  dextrose (GLUTOSE) oral gel 15 g, 15 g, Oral, Q15 Min PRN, Shirley Kurtz MD  •  DULoxetine (CYMBALTA) DR capsule 40 mg, 40 mg, Oral, Daily, Shirley Kurtz MD, 40 mg at 09/10/22 0736  •  glucagon (human recombinant) (GLUCAGEN DIAGNOSTIC) 1 mg in sterile water (preservative free) 1 mL injection, 1 mg, Intramuscular, Q15 Min PRN, Shirley Kurtz MD  •  heparin 30482 units/250 mL (100 units/mL) in 0.45 % NaCl infusion, 18 Units/kg/hr, Intravenous, Titrated, Shirley Kurtz MD, Last Rate: 12.67 mL/hr at 09/10/22 1142, 16 Units/kg/hr at 09/10/22 1142  •  heparin bolus from bag 3,200 Units, 40 Units/kg, Intravenous, Q6H PRN, Shirley Kurtz MD  •  heparin bolus from bag 6,300 Units, 80 Units/kg, Intravenous, Q6H PRN, Shirley Kurtz MD  •  HYDROcodone-acetaminophen (NORCO) 7.5-325 MG per tablet 1 tablet, 1 tablet, Oral, Q4H PRN, Shirley Kurtz MD, 1 tablet at 09/10/22 1122  •  HYDROmorphone (DILAUDID) injection 1 mg, 1 mg, Intravenous, Q3H PRN, Prateek Nixon APRN, 1 mg at 09/10/22 1123  •  hydrOXYzine (ATARAX) tablet 100 mg, 100 mg, Oral, Nightly, Shirley Kurtz MD, 100 mg at 09/09/22 2036  •  insulin glargine (LANTUS, SEMGLEE) injection 20 Units, 20 Units, Subcutaneous, Nightly, Shirley Kurtz MD, 20 Units at 09/09/22 2037  •  insulin lispro (ADMELOG) injection 0-14 Units, 0-14 Units, Subcutaneous, 4x Daily AC & at Bedtime, Prateek Nixon, APRN, 5 Units at 09/10/22 1123  •  isosorbide mononitrate (IMDUR) 24 hr tablet 60 mg, 60 mg, Oral, Daily, Shirley Kurtz MD, 60 mg at 09/10/22 0735  •  Magnesium Sulfate 2 gram infusion - Mg less than or equal to 1.5 mg/dL, 2 g, Intravenous, PRN **OR**  Magnesium Sulfate 1 gram infusion - Mg 1.6-1.9 mg/dL, 1 g, Intravenous, PRN, Ivory Ortiz APRN  •  metoprolol tartrate (LOPRESSOR) tablet 100 mg, 100 mg, Oral, BID, Shirley Kurtz MD, 100 mg at 09/10/22 0736  •  ondansetron (ZOFRAN) tablet 8 mg, 8 mg, Oral, Q6H PRN **OR** ondansetron (ZOFRAN) injection 8 mg, 8 mg, Intravenous, Q6H PRN, Prateek Nixon APRN, 8 mg at 09/09/22 1659  •  potassium chloride (K-DUR,KLOR-CON) CR tablet 40 mEq, 40 mEq, Oral, PRN, 40 mEq at 09/10/22 0950 **OR** potassium chloride (KLOR-CON) packet 40 mEq, 40 mEq, Oral, PRN **OR** potassium chloride 10 mEq in 100 mL IVPB, 10 mEq, Intravenous, Q1H PRN, Ivory Ortiz APRN  •  prochlorperazine (COMPAZINE) injection 5 mg, 5 mg, Intravenous, Q6H PRN, Prateek Nixon APRN  •  promethazine (PHENERGAN) suppository 25 mg, 25 mg, Rectal, Q6H PRN, Taylor Tompkins MD, 25 mg at 09/09/22 1940  •  [COMPLETED] Insert peripheral IV, , , Once **AND** sodium chloride 0.9 % flush 10 mL, 10 mL, Intravenous, PRN, Shirley Kurtz MD, 10 mL at 09/10/22 0736  •  sodium chloride 0.9 % infusion, 100 mL/hr, Intravenous, Continuous, Shirley Kurtz MD, Last Rate: 100 mL/hr at 09/09/22 1527, 100 mL/hr at 09/09/22 1527    Data Review:  All labs (24hrs):   Recent Results (from the past 24 hour(s))   POC Glucose Once    Collection Time: 09/09/22  1:22 PM    Specimen: Blood   Result Value Ref Range    Glucose 174 (H) 70 - 105 mg/dL   POC Glucose Once    Collection Time: 09/09/22  2:25 PM    Specimen: Blood   Result Value Ref Range    Glucose 179 (H) 70 - 105 mg/dL   POC Glucose Once    Collection Time: 09/09/22  3:33 PM    Specimen: Blood   Result Value Ref Range    Glucose 177 (H) 70 - 105 mg/dL   CBC (No Diff)    Collection Time: 09/09/22  3:42 PM    Specimen: Blood   Result Value Ref Range    WBC 20.10 (H) 3.40 - 10.80 10*3/mm3    RBC 4.76 3.77 - 5.28 10*6/mm3    Hemoglobin 13.4 12.0 - 15.9 g/dL    Hematocrit 40.9 34.0 - 46.6 %    MCV 85.9  79.0 - 97.0 fL    MCH 28.1 26.6 - 33.0 pg    MCHC 32.7 31.5 - 35.7 g/dL    RDW 14.2 12.3 - 15.4 %    RDW-SD 43.3 37.0 - 54.0 fl    MPV 7.9 6.0 - 12.0 fL    Platelets 229 140 - 450 10*3/mm3   POC Glucose Once    Collection Time: 09/09/22  5:59 PM    Specimen: Blood   Result Value Ref Range    Glucose 201 (H) 70 - 105 mg/dL   aPTT    Collection Time: 09/09/22  6:19 PM    Specimen: Blood   Result Value Ref Range    PTT 35.6 (L) 61.0 - 76.5 seconds   POC Glucose Once    Collection Time: 09/09/22  8:58 PM    Specimen: Blood   Result Value Ref Range    Glucose 244 (H) 70 - 105 mg/dL   POC Glucose Once    Collection Time: 09/09/22 11:58 PM    Specimen: Blood   Result Value Ref Range    Glucose 161 (H) 70 - 105 mg/dL   aPTT    Collection Time: 09/10/22 12:04 AM    Specimen: Blood   Result Value Ref Range    PTT 70.6 61.0 - 76.5 seconds   Basic Metabolic Panel    Collection Time: 09/10/22  4:21 AM    Specimen: Blood   Result Value Ref Range    Glucose 162 (H) 65 - 99 mg/dL    BUN 5 (L) 6 - 20 mg/dL    Creatinine 0.41 (L) 0.57 - 1.00 mg/dL    Sodium 140 136 - 145 mmol/L    Potassium 3.4 (L) 3.5 - 5.2 mmol/L    Chloride 108 (H) 98 - 107 mmol/L    CO2 23.0 22.0 - 29.0 mmol/L    Calcium 8.7 8.6 - 10.5 mg/dL    BUN/Creatinine Ratio 12.2 7.0 - 25.0    Anion Gap 9.0 5.0 - 15.0 mmol/L    eGFR 114.2 >60.0 mL/min/1.73   CBC (No Diff)    Collection Time: 09/10/22  4:21 AM    Specimen: Blood   Result Value Ref Range    WBC 16.30 (H) 3.40 - 10.80 10*3/mm3    RBC 4.20 3.77 - 5.28 10*6/mm3    Hemoglobin 11.9 (L) 12.0 - 15.9 g/dL    Hematocrit 36.0 34.0 - 46.6 %    MCV 85.7 79.0 - 97.0 fL    MCH 28.3 26.6 - 33.0 pg    MCHC 33.0 31.5 - 35.7 g/dL    RDW 14.0 12.3 - 15.4 %    RDW-SD 42.9 37.0 - 54.0 fl    MPV 8.1 6.0 - 12.0 fL    Platelets 219 140 - 450 10*3/mm3   aPTT    Collection Time: 09/10/22  4:21 AM    Specimen: Blood   Result Value Ref Range    PTT 77.2 (H) 61.0 - 76.5 seconds   POC Glucose Once    Collection Time: 09/10/22  7:34 AM     Specimen: Blood   Result Value Ref Range    Glucose 139 (H) 70 - 105 mg/dL   POC Glucose Once    Collection Time: 09/10/22 11:18 AM    Specimen: Blood   Result Value Ref Range    Glucose 218 (H) 70 - 105 mg/dL   aPTT    Collection Time: 09/10/22 11:33 AM    Specimen: Blood   Result Value Ref Range    PTT 60.6 (L) 61.0 - 76.5 seconds        Imaging:  Arterial Line  Asher Foreman MD     9/9/2022  8:42 AM  Arterial Line    Patient reassessed immediately prior to procedure    Patient location during procedure: OR   Line placed for hemodynamic monitoring and ABGs/Labs/ISTAT.  Performed By   Anesthesiologist: Ahser Foreman MD  Preanesthetic Checklist  Completed: patient identified, IV checked, site marked, risks and benefits   discussed, surgical consent, monitors and equipment checked, pre-op   evaluation and timeout performed  Arterial Line Prep   Sterile Tech: cap, gloves and mask  Prep: ChloraPrep  Patient monitoring: blood pressure monitoring, continuous pulse oximetry   and EKG  Arterial Line Procedure   Laterality:left  Location:  radial artery  Catheter size: 20 G   Guidance: ultrasound guided, landmark technique and palpation technique  Successful placement: yes  Heparin (Porcine) in NaCl 1000-0.9 UT/500ML-% for arterial line pressure   bag, 1,000 Units  Post Assessment   Dressing Type: occlusive dressing applied, secured with tape and wrist   guard applied.   Complications no  Circ/Move/Sens Assessment: unchanged.   Patient Tolerance: patient tolerated the procedure well with no apparent   complications  Additional Notes  Pre-procedure:  Patient identified; pre-procedure vital signs, all relevant labs/studies,   complete medical/surgical/anesthetic history, full medication list, full   allergy list, and NPO status obtained/reviewed; physical assessment   performed; anesthetic options, side effects, potential complications,   risks, and benefits discussed; questions answered; written anesthesia    procedure consent obtained; patient cleared for procedure; IV access in   situ    Procedure:  Arterial line placed after induction of general anesthesia; ASA monitor   placed; patient positioned; hand hygiene performed; sterile technique   maintained throughout the procedure; sterile prep and drape applied;   insertion site determined by anatomical landmarks, palpation, and   ultrasound imaging; live ultrasound guidance throughout the procedure;   target artery identified on live ultrasound; target artery is patent with   flow; needle placed into the skin and advanced into the target artery with   correct placement confirmed by return of arterial blood; realtime needle   entry into the target artery witnessed on live ultrasound; wire slide into   the target artery; arterial catheter threaded into the target artery over   the needle/wire; needle/wire removed; correct catheter placement confirmed   by transducing systemic arterial blood pressure; catheter secured with   occlusive dressing and tape    Post-procedure:  Arterial catheter placed successfully; no apparent complications; vital   signs stable throughout; minimal estimated blood loss       ASSESSMENT:  Arterial stent thrombosis  DM II  CAD  HTN  Right leg pain  Anxiety  PAD        PLAN:  Continue Monitor BP's closely   Monitor pulse   Plavix  Dc IV fluids   pain management   Incentive spirometer  Electrolytes/ glycemic control  DVT and GI prophylaxis.    Total Critical care time in direct medical management (   ) minutes. This time specifically excludes time spent performing procedures.  Taylor Tompkins MD. D, ABSM.     9/10/2022  12:06 EDT

## 2022-09-10 NOTE — PLAN OF CARE
Pt SIPs overflow. A-line, IJ central line, and F/C removed. Tolerating diet. Doppler pulses in BLE. Pain controlled w/ q4 norco and dilaudid

## 2022-09-10 NOTE — PROGRESS NOTES
Name: Blossom Martin ADMIT: 2022   : 1965  PCP: Collin Low APRN    MRN: [id number] LOS: 4 days   AGE/SEX: 57 y.o. female  ROOM: [address] [place] [address]    [place], Post Op [employer]    Chief Complaint   Patient presents with   • Groin Pain     CC: Postop day 1 status post bilateral femoral cutdowns with bilateral iliac stent grafts  Subjective     57 y.o. female doing well status post bilateral femoral cutdowns with bilateral iliac stent grafts.  Patient has warm right foot and good signals on both feet.  She has no complaints.    Review of Systems feels well and wishes to walk.    Objective     Scheduled Medications:   amLODIPine, 5 mg, Oral, Q24H  buPROPion XL, 450 mg, Oral, QAM  clopidogrel, 75 mg, Oral, Daily  DULoxetine, 40 mg, Oral, Daily  hydrOXYzine, 100 mg, Oral, Nightly  insulin glargine, 20 Units, Subcutaneous, Nightly  insulin lispro, 0-14 Units, Subcutaneous, 4x Daily AC & at Bedtime  isosorbide mononitrate, 60 mg, Oral, Daily  metoprolol tartrate, 100 mg, Oral, BID        Active Infusions:  heparin, 18 Units/kg/hr, Last Rate: 16 Units/kg/hr (09/10/22 1142)        As Needed Medications:  •  acetaminophen **OR** acetaminophen **OR** acetaminophen  •  dextrose  •  dextrose  •  glucagon (human recombinant)  •  heparin  •  heparin  •  HYDROcodone-acetaminophen  •  HYDROmorphone  •  magnesium sulfate **OR** magnesium sulfate in D5W 1g/100mL (PREMIX)  •  ondansetron **OR** ondansetron  •  potassium chloride **OR** potassium chloride **OR** potassium chloride  •  prochlorperazine  •  promethazine  •  [COMPLETED] Insert peripheral IV **AND** sodium chloride    Vital Signs  Vital Signs Patient Vitals for the past 24 hrs:   BP Temp Temp src Pulse SpO2 Weight   09/10/22 1700 132/65 -- -- 66 98 % --   09/10/22 1600 -- -- -- 68 -- --   09/10/22 1546 -- 97.8 °F (36.6 °C) Axillary -- -- --   09/10/22 1500 125/63 -- -- 66 96 % --   09/10/22 1400 97/45 -- -- 68 93 % --   09/10/22 1316 109/50 -- -- --  -- --   09/10/22 1300 119/60 -- -- 67 99 % --   09/10/22 1200 124/56 -- -- 66 98 % --   09/10/22 1154 -- -- -- 65 96 % --   09/10/22 1100 -- -- -- 67 98 % --   09/10/22 1030 -- -- -- 68 97 % --   09/10/22 1000 148/66 -- -- 66 -- --   09/10/22 0930 -- -- -- 64 100 % --   09/10/22 0900 139/74 -- -- 65 98 % --   09/10/22 0830 -- -- -- 72 92 % --   09/10/22 0800 -- -- -- 75 98 % --   09/10/22 0735 170/74 -- -- 74 -- --   09/10/22 0730 170/74 -- -- 75 98 % --   09/10/22 0700 149/73 99.2 °F (37.3 °C) Oral 76 94 % --   09/10/22 0600 -- -- -- 74 92 % --   09/10/22 0500 -- -- -- 75 (!) 87 % 82.7 kg (182 lb 5.1 oz)   09/10/22 0400 -- -- -- 76 96 % --   09/10/22 0300 137/66 -- -- 75 93 % --   09/10/22 0200 134/75 -- -- 78 96 % --   09/10/22 0126 -- -- -- 79 -- --   09/10/22 0100 145/68 -- -- 84 91 % --   09/10/22 0000 131/61 -- -- 80 97 % --   09/09/22 2356 -- 98.6 °F (37 °C) Oral -- -- --   09/09/22 2300 151/64 -- -- 78 99 % --   09/09/22 2200 135/71 -- -- 80 95 % --   09/09/22 2100 141/70 -- -- 90 100 % --   09/09/22 2030 158/81 -- -- 86 94 % --   09/09/22 2011 -- 98.4 °F (36.9 °C) Oral -- -- --   09/09/22 2000 147/77 -- -- 87 -- --   09/09/22 1930 179/83 -- -- 84 97 % --   09/09/22 1900 -- -- -- 80 93 % --   09/09/22 1830 -- -- -- 84 95 % --   09/09/22 1801 159/65 -- -- 86 -- --   09/09/22 1800 -- -- -- 84 98 % --     Vital Signs (range)  Temp:  [97.8 °F (36.6 °C)-99.2 °F (37.3 °C)] 97.8 °F (36.6 °C)  Heart Rate:  [64-90] 66  BP: ()/(45-83) 132/65  Arterial Line BP: (113-203)/(49-87) 156/63  I/O:  I/O last 3 completed shifts:  In: 4861 [P.O.:600; I.V.:4026; Blood:135; IV Piggyback:100]  Out: 4700 [Urine:4100; Blood:600]  I/O:   Intake/Output Summary (Last 24 hours) at 9/10/2022 1715  Last data filed at 9/10/2022 1621  Gross per 24 hour   Intake 3636 ml   Output 3050 ml   Net 586 ml     BMI:  Body mass index is 32.3 kg/m².    Physical Exam:  Physical Exam   Lungs clear and equal  Abdomen benign  Groins  clear  Extremities with good signals    Results Review:     CBC    Results from last 7 days   Lab Units 09/10/22  0421 09/09/22  1542 09/09/22  1201 09/09/22  0906 09/09/22  0302 09/08/22 1840 09/07/22  2347 09/06/22  1239   WBC 10*3/mm3 16.30* 20.10*  --   --  12.80* 14.90* 12.50* 11.50*   HEMOGLOBIN g/dL 11.9* 13.4  --   --  13.3 13.9 15.4 15.4   HEMOGLOBIN, POC g/dL  --   --  12.2 12.6  --   --   --   --    PLATELETS 10*3/mm3 219 229  --   --  259 283 317 306     BMP   Results from last 7 days   Lab Units 09/10/22  1426 09/10/22  0421 09/09/22  0302 09/08/22 1840 09/06/22  1239   SODIUM mmol/L  --  140 136 136 137   POTASSIUM mmol/L 4.1 3.4* 3.9 3.8 4.1   CHLORIDE mmol/L  --  108* 103 102 98   CO2 mmol/L  --  23.0 22.0 24.0 25.0   BUN mg/dL  --  5* 10 12 11   CREATININE mg/dL  --  0.41* 0.53* 0.80 0.71   GLUCOSE mg/dL  --  162* 290* 328* 373*     Cr Clearance Estimated Creatinine Clearance: 152.3 mL/min (A) (by C-G formula based on SCr of 0.41 mg/dL (L)).  Coag   Results from last 7 days   Lab Units 09/10/22  1133 09/10/22  0421 09/10/22  0004 09/09/22  1819 09/09/22  0302 09/08/22 1840 09/07/22  2347 09/07/22  1543 09/07/22  0024 09/06/22  1239   INR   --   --   --   --  0.97 1.00  --   --   --  <0.93*   APTT seconds 60.6* 77.2* 70.6 35.6* 59.3*  --  60.4* 86.8*   < > 26.2*    < > = values in this interval not displayed.     HbA1C   Lab Results   Component Value Date    HGBA1C 10.2 (H) 09/07/2022     Infection   Results from last 7 days   Lab Units 09/06/22  1739 09/06/22  1736   BLOODCX  No growth at 3 days No growth at 3 days     Radiology(recent) No radiology results for the last day    Assessment & Plan     Assessment & Plan      Right leg pain    Arterial stent thrombosis, initial encounter (HCC)    Diabetes mellitus (HCC)    CAD (coronary artery disease)    Hypertension    Anxiety disorder      57 y.o. female doing well status post revascularization of lower extremities.  Plans to advance activity made.   We will get lines out.  Okay for transfer to the floor when available.  We will stop heparin drip and begin Eliquis with her Plavix.      Gerald Martel MD  09/10/22  17:14 EDT    Please call my office with any question: [phone]    Active Hospital Problems    Diagnosis  POA   • **Right leg pain [M79.604]  Yes   • CAD (coronary artery disease) [I25.10]  Yes   • Hypertension [I10]  Yes   • Anxiety disorder [F41.9]  Yes   • Arterial stent thrombosis, initial encounter (HCC) [T82.868A]  Yes   • Diabetes mellitus (HCC) [E11.9]  Yes      Resolved Hospital Problems   No resolved problems to display.

## 2022-09-10 NOTE — PROGRESS NOTES
[facility] Medicine Services Daily Progress Note    Patient Name: Blossom Martin  : 1965  MRN: [id number]  Primary Care Physician:  Collin Low APRN  Date of admission: 2022      Subjective      Chief Complaint: Right leg pain      Reconsulted to assume care of the patient upon transfer out of ICU. No acute issues.      Review of Systems   All other systems reviewed and are negative.        Objective      Vitals:   Temp:  [97.8 °F (36.6 °C)-99.2 °F (37.3 °C)] 97.8 °F (36.6 °C)  Heart Rate:  [64-90] 66  BP: ()/(45-83) 132/65  Arterial Line BP: (113-203)/(49-87) 156/63    Physical Exam   Vital signs reviewed.  Nursing notes reviewed.  General: well-developed and well-nourished, NAD  HEENT: NC/AT, EOMI, PERRLA  Heart: RRR. No murmur   Chest: CTAB, no w/r/r, normal respiratory effort  Abdominal: Soft. NT/ND. Bowel sounds present  Musculoskeletal: Normal ROM.    Neurological: AAOx3, no focal deficits  Skin: Warm ,dry, no rash or lesions  Psychiatric: Normal mood and affect.       Result Review    Result Review:  I have personally reviewed the results from the time of this admission to 9/10/2022 18:56 EDT and agree with these findings:  [x]  Laboratory  []  Microbiology  [x]  Radiology  [x]  EKG/Telemetry   [x]  Cardiology/Vascular   []  Pathology  [x]  Old records  []  Other:  Most notable findings include:     Wounds (last 24 hours)     LDA Wound     Row Name 09/10/22 1534 09/10/22 1151 09/10/22 0750       Wound 22 Bilateral anterior groin Incision    Wound - Properties Group Placement Date: 22  -CM Present on Hospital Admission: N  -CM Side: Bilateral  -CM Orientation: anterior  -CM Location: groin  -CM Primary Wound Type: Incision  -CM    Closure Approximated;Liquid skin adhesive  -KZ Approximated;Liquid skin adhesive  -KZ Approximated;Liquid skin adhesive  -KZ    Base clean;dry  -KZ clean;dry  -KZ clean;dry  -KZ    Periwound dry;intact  -KZ  dry;intact  -KZ dry;intact  -KZ    Drainage Amount none  -KZ none  -KZ none  -KZ    Retired Wound - Properties Group Placement Date: 09/09/22  -CM Present on Hospital Admission: N  -CM Side: Bilateral  -CM Orientation: anterior  -CM Location: groin  -CM Primary Wound Type: Incision  -CM    Retired Wound - Properties Group Date first assessed: 09/09/22  -CM Present on Hospital Admission: N  -CM Side: Bilateral  -CM Location: groin  -CM Primary Wound Type: Incision  -CM    Row Name 09/10/22 0400 09/10/22 0000 09/09/22 2000       Wound 09/09/22 Bilateral anterior groin Incision    Wound - Properties Group Placement Date: 09/09/22  -CM Present on Hospital Admission: N  -CM Side: Bilateral  -CM Orientation: anterior  -CM Location: groin  -CM Primary Wound Type: Incision  -CM    Dressing Appearance dry;intact;no drainage  -JS dry;intact;no drainage  -JS dry;intact  -JS    Closure Approximated;Liquid skin adhesive  -JS Approximated;Liquid skin adhesive  -JS Approximated;Liquid skin adhesive  -JS    Base clean;dry  -JS clean;dry  -JS clean;dry  -JS    Periwound dry;intact  -JS dry;intact  -JS dry;intact  -JS    Drainage Amount none  -JS none  -JS none  -JS    Care, Wound -- cleansed with;antimicrobial agent applied  -JS --    Dressing Care -- dressing applied;non-adherent;transparent film  -JS --    Periwound Care -- dry periwound area maintained  -JS --    Retired Wound - Properties Group Placement Date: 09/09/22  -CM Present on Hospital Admission: N  -CM Side: Bilateral  -CM Orientation: anterior  -CM Location: groin  -CM Primary Wound Type: Incision  -CM    Retired Wound - Properties Group Date first assessed: 09/09/22  -CM Present on Hospital Admission: N  -CM Side: Bilateral  -CM Location: groin  -CM Primary Wound Type: Incision  -CM          User Key  (r) = Recorded By, (t) = Taken By, (c) = Cosigned By    Initials Name Provider Type    Kayla Burroughs RN Registered Nurse    Angelia Flores RN Registered  Nurse    Daniel Alexander, RN Registered Nurse            9/6/2022 CTA abdominal aorta bilateral iliofemoral runoff  IMPRESSION:     1. Complete occlusion, previously stented right common iliac artery, age indeterminate, but potentially acute.  2. Multifocal stenoses involving the right external iliac and common femoral arteries.  3. Single vessel infrapopliteal runoff on the right by the posterior tibial artery. Embolic occlusion of the of the vessels is not excluded.  4. Moderate grade stenosis, mid left external iliac artery.  5. Long segment occlusion, left superficial femoral artery, likely chronic.  6. Moderate grade proximal left renal arterial stenosis.  7. Hepatic steatosis.   8. Numerous additional findings, both vascular and nonvascular, as described above in greater detail.      Assessment & Plan      Brief Patient Summary:  Blossom Martin is a 57 y.o. female who presented to [facility] on 9/6/2022 complaining of right lower extremity pain which is worsened over the last 48 hours.  The pain is significantly worsened with walking and is described as a cramping pain in the lower extremity as well as a hot poker burning pain in the right groin.  The patient denies any known injury.     Review of records: Patient had a right iliac stent placed secondary to peripheral vascular disease approximately a year ago at [facility].  Decision to request these records has been made and order placed.       amLODIPine, 5 mg, Oral, Q24H  apixaban, 2.5 mg, Oral, Q12H  buPROPion XL, 450 mg, Oral, QAM  clopidogrel, 75 mg, Oral, Daily  DULoxetine, 40 mg, Oral, Daily  hydrOXYzine, 100 mg, Oral, Nightly  insulin glargine, 20 Units, Subcutaneous, Nightly  insulin lispro, 0-14 Units, Subcutaneous, 4x Daily AC & at Bedtime  isosorbide mononitrate, 60 mg, Oral, Daily  metoprolol tartrate, 100 mg, Oral, BID             Active Hospital Problems:  Active Hospital Problems    Diagnosis    • **Right leg pain     • Arterial stent thrombosis, initial encounter (HCC)    • CAD (coronary artery disease)    • Hypertension    • Anxiety disorder    • Diabetes mellitus (HCC)      Plan:   Right iliac arterial stenosis with stent occlusion and limb ischemia  Peripheral Vascular disease  --continue heparin drip until Vascular team states otherwise  --Vascular surgery following    --unable to do right leg thrombolysis in cath lab    --9/9/2022 s/p revascularization (op note reviewed)    --heparin drip discontinued    --start Eliquis and Plavix    Diabetes mellitus type 2  -- Glimepiride held on admission  -- Continue Tradjenta as formulary replacement for Januvia  -- Start Lantus 20 units nightly, will adjust dose upward as needed     --Per diabetes educator, patient was on Levemir 75 units daily, until she lost insurance coverage  -- Sliding scale coverage with meals and as needed  -- Accu-Cheks before meals and at bedtime  -- HgbA1c 10.2  -- Diabetes education    CAD  Essential hypertension  -- Continue Plavix, Imdur  -- Continue metoprolol  -- Continue to monitor BP and vitals with cardiac monitoring    Anxiety  --Continue Wellbutrin, Cymbalta, Atarax    DVT prophylaxis:  Medical DVT prophylaxis orders are present.    CODE STATUS:    Level Of Support Discussed With: Patient  Code Status (Patient has no pulse and is not breathing): CPR (Attempt to Resuscitate)  Medical Interventions (Patient has pulse or is breathing): Full Support  Release to patient: Routine Release      Disposition:  I expect patient to be discharged pending clinical course and consultant recommendations.    This patient has been examined wearing appropriate Personal Protective Equipment. 09/10/22      Electronically signed by Gaby Evangelista MD, 09/10/22, 18:56 EDT.  [facility] Hospitalist Team

## 2022-09-11 LAB
ALBUMIN SERPL-MCNC: 3.2 G/DL (ref 3.5–5.2)
ALBUMIN/GLOB SERPL: 0.9 G/DL
ALP SERPL-CCNC: 103 U/L (ref 39–117)
ALT SERPL W P-5'-P-CCNC: 14 U/L (ref 1–33)
ANION GAP SERPL CALCULATED.3IONS-SCNC: 12 MMOL/L (ref 5–15)
APTT PPP: 51.5 SECONDS (ref 61–76.5)
AST SERPL-CCNC: 20 U/L (ref 1–32)
BACTERIA SPEC AEROBE CULT: NORMAL
BACTERIA SPEC AEROBE CULT: NORMAL
BASOPHILS # BLD AUTO: 0.1 10*3/MM3 (ref 0–0.2)
BASOPHILS NFR BLD AUTO: 0.7 % (ref 0–1.5)
BILIRUB SERPL-MCNC: 0.6 MG/DL (ref 0–1.2)
BUN SERPL-MCNC: 4 MG/DL (ref 6–20)
BUN/CREAT SERPL: 6.9 (ref 7–25)
CALCIUM SPEC-SCNC: 9.1 MG/DL (ref 8.6–10.5)
CHLORIDE SERPL-SCNC: 101 MMOL/L (ref 98–107)
CO2 SERPL-SCNC: 22 MMOL/L (ref 22–29)
CREAT SERPL-MCNC: 0.58 MG/DL (ref 0.57–1)
DEPRECATED RDW RBC AUTO: 44.6 FL (ref 37–54)
EGFRCR SERPLBLD CKD-EPI 2021: 105 ML/MIN/1.73
EOSINOPHIL # BLD AUTO: 0.3 10*3/MM3 (ref 0–0.4)
EOSINOPHIL NFR BLD AUTO: 1.7 % (ref 0.3–6.2)
ERYTHROCYTE [DISTWIDTH] IN BLOOD BY AUTOMATED COUNT: 14.2 % (ref 12.3–15.4)
GLOBULIN UR ELPH-MCNC: 3.4 GM/DL
GLUCOSE BLDC GLUCOMTR-MCNC: 240 MG/DL (ref 70–105)
GLUCOSE BLDC GLUCOMTR-MCNC: 248 MG/DL (ref 70–105)
GLUCOSE BLDC GLUCOMTR-MCNC: 252 MG/DL (ref 70–105)
GLUCOSE BLDC GLUCOMTR-MCNC: 343 MG/DL (ref 70–105)
GLUCOSE SERPL-MCNC: 313 MG/DL (ref 65–99)
HCT VFR BLD AUTO: 36.5 % (ref 34–46.6)
HGB BLD-MCNC: 11.8 G/DL (ref 12–15.9)
HOLD SPECIMEN: NORMAL
LYMPHOCYTES # BLD AUTO: 3.3 10*3/MM3 (ref 0.7–3.1)
LYMPHOCYTES NFR BLD AUTO: 19.2 % (ref 19.6–45.3)
MCH RBC QN AUTO: 28.6 PG (ref 26.6–33)
MCHC RBC AUTO-ENTMCNC: 32.2 G/DL (ref 31.5–35.7)
MCV RBC AUTO: 88.6 FL (ref 79–97)
MONOCYTES # BLD AUTO: 1.4 10*3/MM3 (ref 0.1–0.9)
MONOCYTES NFR BLD AUTO: 8.1 % (ref 5–12)
NEUTROPHILS NFR BLD AUTO: 12.2 10*3/MM3 (ref 1.7–7)
NEUTROPHILS NFR BLD AUTO: 70.3 % (ref 42.7–76)
NRBC BLD AUTO-RTO: 0 /100 WBC (ref 0–0.2)
PLATELET # BLD AUTO: 212 10*3/MM3 (ref 140–450)
PMV BLD AUTO: 8.6 FL (ref 6–12)
POTASSIUM SERPL-SCNC: 4 MMOL/L (ref 3.5–5.2)
PROT SERPL-MCNC: 6.6 G/DL (ref 6–8.5)
RBC # BLD AUTO: 4.12 10*6/MM3 (ref 3.77–5.28)
SODIUM SERPL-SCNC: 135 MMOL/L (ref 136–145)
WBC NRBC COR # BLD: 17.3 10*3/MM3 (ref 3.4–10.8)

## 2022-09-11 PROCEDURE — 85025 COMPLETE CBC W/AUTO DIFF WBC: CPT | Performed by: INTERNAL MEDICINE

## 2022-09-11 PROCEDURE — 85730 THROMBOPLASTIN TIME PARTIAL: CPT | Performed by: STUDENT IN AN ORGANIZED HEALTH CARE EDUCATION/TRAINING PROGRAM

## 2022-09-11 PROCEDURE — 80053 COMPREHEN METABOLIC PANEL: CPT | Performed by: INTERNAL MEDICINE

## 2022-09-11 PROCEDURE — 82962 GLUCOSE BLOOD TEST: CPT

## 2022-09-11 PROCEDURE — 97166 OT EVAL MOD COMPLEX 45 MIN: CPT

## 2022-09-11 PROCEDURE — 99231 SBSQ HOSP IP/OBS SF/LOW 25: CPT | Performed by: INTERNAL MEDICINE

## 2022-09-11 PROCEDURE — 63710000001 INSULIN LISPRO (HUMAN) PER 5 UNITS: Performed by: NURSE PRACTITIONER

## 2022-09-11 PROCEDURE — 25010000002 HYDROMORPHONE 1 MG/ML SOLUTION: Performed by: NURSE PRACTITIONER

## 2022-09-11 PROCEDURE — 63710000001 INSULIN GLARGINE PER 5 UNITS: Performed by: STUDENT IN AN ORGANIZED HEALTH CARE EDUCATION/TRAINING PROGRAM

## 2022-09-11 PROCEDURE — 97162 PT EVAL MOD COMPLEX 30 MIN: CPT

## 2022-09-11 RX ADMIN — INSULIN LISPRO 5 UNITS: 100 INJECTION, SOLUTION INTRAVENOUS; SUBCUTANEOUS at 12:34

## 2022-09-11 RX ADMIN — BUPROPION HYDROCHLORIDE 450 MG: 150 TABLET, EXTENDED RELEASE ORAL at 08:24

## 2022-09-11 RX ADMIN — ACETAMINOPHEN 650 MG: 325 TABLET, FILM COATED ORAL at 18:38

## 2022-09-11 RX ADMIN — ACETAMINOPHEN 650 MG: 325 TABLET, FILM COATED ORAL at 22:04

## 2022-09-11 RX ADMIN — ISOSORBIDE MONONITRATE 60 MG: 60 TABLET, EXTENDED RELEASE ORAL at 08:24

## 2022-09-11 RX ADMIN — INSULIN LISPRO 0 UNITS: 100 INJECTION, SOLUTION INTRAVENOUS; SUBCUTANEOUS at 17:14

## 2022-09-11 RX ADMIN — HYDROXYZINE HYDROCHLORIDE 100 MG: 25 TABLET, FILM COATED ORAL at 22:00

## 2022-09-11 RX ADMIN — Medication 10 ML: at 22:07

## 2022-09-11 RX ADMIN — INSULIN LISPRO 5 UNITS: 100 INJECTION, SOLUTION INTRAVENOUS; SUBCUTANEOUS at 08:24

## 2022-09-11 RX ADMIN — METOPROLOL TARTRATE 100 MG: 50 TABLET, FILM COATED ORAL at 08:25

## 2022-09-11 RX ADMIN — CLOPIDOGREL BISULFATE 75 MG: 75 TABLET ORAL at 08:26

## 2022-09-11 RX ADMIN — HYDROMORPHONE HYDROCHLORIDE 1 MG: 1 INJECTION, SOLUTION INTRAMUSCULAR; INTRAVENOUS; SUBCUTANEOUS at 08:25

## 2022-09-11 RX ADMIN — AMLODIPINE BESYLATE 5 MG: 5 TABLET ORAL at 08:25

## 2022-09-11 RX ADMIN — INSULIN GLARGINE 20 UNITS: 100 INJECTION, SOLUTION SUBCUTANEOUS at 22:00

## 2022-09-11 RX ADMIN — APIXABAN 2.5 MG: 2.5 TABLET, FILM COATED ORAL at 08:25

## 2022-09-11 RX ADMIN — INSULIN LISPRO 10 UNITS: 100 INJECTION, SOLUTION INTRAVENOUS; SUBCUTANEOUS at 22:00

## 2022-09-11 RX ADMIN — APIXABAN 2.5 MG: 2.5 TABLET, FILM COATED ORAL at 22:00

## 2022-09-11 RX ADMIN — METOPROLOL TARTRATE 100 MG: 50 TABLET, FILM COATED ORAL at 22:00

## 2022-09-11 RX ADMIN — DULOXETINE HYDROCHLORIDE 40 MG: 20 CAPSULE, DELAYED RELEASE ORAL at 08:24

## 2022-09-11 NOTE — PROGRESS NOTES
"PULMONARY CRITICAL CARE Progress  NOTE      PATIENT IDENTIFICATION:  Name: Blossom Martin  MRN: [id number]  :  1965     Age: 57 y.o.  Sex: female    DATE OF Note:  2022   Referring Physician: Gaby Evangelista MD                  Subjective:   Feeling better, pain improving and leg, currently on room air, no SOB, no chest or abdominal pain, no nausea or vomiting, no bowel or bladder issues reported    Objective:  tMax 24 hrs: Temp (24hrs), Av.1 °F (36.7 °C), Min:97.6 °F (36.4 °C), Max:99.1 °F (37.3 °C)      Vitals Ranges:   Temp:  [97.6 °F (36.4 °C)-99.1 °F (37.3 °C)] 98.2 °F (36.8 °C)  Heart Rate:  [64-73] 64  Resp:  [14-16] 14  BP: ()/(45-84) 108/63  Arterial Line BP: (113-156)/(49-65) 156/63    Intake and Output Last 3 Shifts:   I/O last 3 completed shifts:  In: 3058 [P.O.:860; I.V.:2198]  Out: 2300 [Urine:2300]    Exam:  /63 (BP Location: Right arm, Patient Position: Lying)   Pulse 64   Temp 98.2 °F (36.8 °C) (Oral)   Resp 14   Ht 160 cm (63\")   Wt 82.7 kg (182 lb 5.1 oz)   SpO2 100%   BMI 32.30 kg/m²     General Appearance:   AAO  HEENT:  Normocephalic, without obvious abnormality, Conjunctivae/corneas clear.  Normal external ear canals, nares normal, no drainage     Neck:  Supple, symmetrical, trachea midline. No JVD.  Lungs /Chest wall:   Bilateral basal rhonchi, respirations unlabored, symmetrical wall movement.     Heart:  Regular rate and rhythm, systolic murmur PMI left sternal border  Abdomen: Soft, nontender, no masses, no organomegaly.    Extremities: Trace edema, no clubbing or cyanosis        Medications:    Current Facility-Administered Medications:   •  acetaminophen (TYLENOL) tablet 650 mg, 650 mg, Oral, Q4H PRN **OR** acetaminophen (TYLENOL) 160 MG/5ML solution 650 mg, 650 mg, Oral, Q4H PRN **OR** acetaminophen (TYLENOL) suppository 650 mg, 650 mg, Rectal, Q4H PRN, Prateek Nixon APRN  •  amLODIPine (NORVASC) tablet 5 mg, 5 mg, Oral, Q24H, Day, Martha, " APRN, 5 mg at 09/11/22 0825  •  apixaban (ELIQUIS) tablet 2.5 mg, 2.5 mg, Oral, Q12H, Gerald Martel MD, 2.5 mg at 09/11/22 0825  •  buPROPion XL (WELLBUTRIN XL) 24 hr tablet 450 mg, 450 mg, Oral, QAM, Shirley Kurtz MD, 450 mg at 09/11/22 0824  •  clopidogrel (PLAVIX) tablet 75 mg, 75 mg, Oral, Daily, Shirley Kurtz MD, 75 mg at 09/11/22 0826  •  dextrose (D50W) (25 g/50 mL) IV injection 25 g, 25 g, Intravenous, Q15 Min PRN, Shirley Kurtz MD  •  dextrose (GLUTOSE) oral gel 15 g, 15 g, Oral, Q15 Min PRN, Shirley Kurtz MD  •  DULoxetine (CYMBALTA) DR capsule 40 mg, 40 mg, Oral, Daily, Shirley Kurtz MD, 40 mg at 09/11/22 0824  •  glucagon (human recombinant) (GLUCAGEN DIAGNOSTIC) 1 mg in sterile water (preservative free) 1 mL injection, 1 mg, Intramuscular, Q15 Min PRN, Shirley Kurtz MD  •  heparin bolus from bag 3,200 Units, 40 Units/kg, Intravenous, Q6H PRN, Shirley Kurtz MD  •  heparin bolus from bag 6,300 Units, 80 Units/kg, Intravenous, Q6H PRN, Shirley Kurtz MD  •  HYDROmorphone (DILAUDID) injection 1 mg, 1 mg, Intravenous, Q3H PRN, Prateek Nixon, APRN, 1 mg at 09/11/22 0825  •  hydrOXYzine (ATARAX) tablet 100 mg, 100 mg, Oral, Nightly, Shirley Kurtz MD, 100 mg at 09/10/22 2017  •  insulin glargine (LANTUS, SEMGLEE) injection 20 Units, 20 Units, Subcutaneous, Nightly, Shirley Kurtz MD, 20 Units at 09/10/22 2156  •  insulin lispro (ADMELOG) injection 0-14 Units, 0-14 Units, Subcutaneous, 4x Daily AC & at Bedtime, Prateek Nixon APRN, 5 Units at 09/11/22 1234  •  isosorbide mononitrate (IMDUR) 24 hr tablet 60 mg, 60 mg, Oral, Daily, Shirley Kurtz MD, 60 mg at 09/11/22 0824  •  Magnesium Sulfate 2 gram infusion - Mg less than or equal to 1.5 mg/dL, 2 g, Intravenous, PRN **OR** Magnesium Sulfate 1 gram infusion - Mg 1.6-1.9 mg/dL, 1 g, Intravenous, PRN, Ivory Ortiz APRN  •  metoprolol tartrate (LOPRESSOR) tablet 100 mg, 100 mg,  Oral, BID, Shirley Kurtz MD, 100 mg at 09/11/22 0825  •  ondansetron (ZOFRAN) tablet 8 mg, 8 mg, Oral, Q6H PRN **OR** ondansetron (ZOFRAN) injection 8 mg, 8 mg, Intravenous, Q6H PRN, Prateek Nixon, APRN, 8 mg at 09/09/22 1659  •  potassium chloride (K-DUR,KLOR-CON) CR tablet 40 mEq, 40 mEq, Oral, PRN, 40 mEq at 09/10/22 0950 **OR** potassium chloride (KLOR-CON) packet 40 mEq, 40 mEq, Oral, PRN **OR** potassium chloride 10 mEq in 100 mL IVPB, 10 mEq, Intravenous, Q1H PRN, Ivory Ortiz APRN  •  prochlorperazine (COMPAZINE) injection 5 mg, 5 mg, Intravenous, Q6H PRN, Prateek Nixon APRN  •  promethazine (PHENERGAN) suppository 25 mg, 25 mg, Rectal, Q6H PRN, Taylor Tompkins MD, 25 mg at 09/09/22 1940  •  [COMPLETED] Insert peripheral IV, , , Once **AND** sodium chloride 0.9 % flush 10 mL, 10 mL, Intravenous, PRN, Shirley Kurtz MD, 10 mL at 09/10/22 2018    Data Review:  All labs (24hrs):   Recent Results (from the past 24 hour(s))   Potassium    Collection Time: 09/10/22  2:26 PM    Specimen: Blood   Result Value Ref Range    Potassium 4.1 3.5 - 5.2 mmol/L   POC Glucose Once    Collection Time: 09/10/22  3:44 PM    Specimen: Blood   Result Value Ref Range    Glucose 232 (H) 70 - 105 mg/dL   POC Glucose Once    Collection Time: 09/10/22  8:25 PM    Specimen: Blood   Result Value Ref Range    Glucose 189 (H) 70 - 105 mg/dL   aPTT    Collection Time: 09/11/22  2:56 AM    Specimen: Blood   Result Value Ref Range    PTT 51.5 (L) 61.0 - 76.5 seconds   CBC Auto Differential    Collection Time: 09/11/22  2:56 AM    Specimen: Blood   Result Value Ref Range    WBC 17.30 (H) 3.40 - 10.80 10*3/mm3    RBC 4.12 3.77 - 5.28 10*6/mm3    Hemoglobin 11.8 (L) 12.0 - 15.9 g/dL    Hematocrit 36.5 34.0 - 46.6 %    MCV 88.6 79.0 - 97.0 fL    MCH 28.6 26.6 - 33.0 pg    MCHC 32.2 31.5 - 35.7 g/dL    RDW 14.2 12.3 - 15.4 %    RDW-SD 44.6 37.0 - 54.0 fl    MPV 8.6 6.0 - 12.0 fL    Platelets 212 140 - 450 10*3/mm3     Neutrophil % 70.3 42.7 - 76.0 %    Lymphocyte % 19.2 (L) 19.6 - 45.3 %    Monocyte % 8.1 5.0 - 12.0 %    Eosinophil % 1.7 0.3 - 6.2 %    Basophil % 0.7 0.0 - 1.5 %    Neutrophils, Absolute 12.20 (H) 1.70 - 7.00 10*3/mm3    Lymphocytes, Absolute 3.30 (H) 0.70 - 3.10 10*3/mm3    Monocytes, Absolute 1.40 (H) 0.10 - 0.90 10*3/mm3    Eosinophils, Absolute 0.30 0.00 - 0.40 10*3/mm3    Basophils, Absolute 0.10 0.00 - 0.20 10*3/mm3    nRBC 0.0 0.0 - 0.2 /100 WBC   POC Glucose Once    Collection Time: 09/11/22  7:52 AM    Specimen: Blood   Result Value Ref Range    Glucose 248 (H) 70 - 105 mg/dL   Comprehensive Metabolic Panel    Collection Time: 09/11/22  8:26 AM    Specimen: Blood   Result Value Ref Range    Glucose 313 (H) 65 - 99 mg/dL    BUN 4 (L) 6 - 20 mg/dL    Creatinine 0.58 0.57 - 1.00 mg/dL    Sodium 135 (L) 136 - 145 mmol/L    Potassium 4.0 3.5 - 5.2 mmol/L    Chloride 101 98 - 107 mmol/L    CO2 22.0 22.0 - 29.0 mmol/L    Calcium 9.1 8.6 - 10.5 mg/dL    Total Protein 6.6 6.0 - 8.5 g/dL    Albumin 3.20 (L) 3.50 - 5.20 g/dL    ALT (SGPT) 14 1 - 33 U/L    AST (SGOT) 20 1 - 32 U/L    Alkaline Phosphatase 103 39 - 117 U/L    Total Bilirubin 0.6 0.0 - 1.2 mg/dL    Globulin 3.4 gm/dL    A/G Ratio 0.9 g/dL    BUN/Creatinine Ratio 6.9 (L) 7.0 - 25.0    Anion Gap 12.0 5.0 - 15.0 mmol/L    eGFR 105.0 >60.0 mL/min/1.73   Gold Top - SST    Collection Time: 09/11/22  8:26 AM   Result Value Ref Range    Extra Tube Hold for add-ons.    POC Glucose Once    Collection Time: 09/11/22 11:14 AM    Specimen: Blood   Result Value Ref Range    Glucose 240 (H) 70 - 105 mg/dL        Imaging:  Cardiac Catheterization/Vascular Study  Preoperative Diagnosis:   Peripheral arterial disease with rest pain, acute right limb ischemia    Postoperative Diagnosis:   Same    Procedure Performed:   US guided right common femoral artery access with micropuncture  US guided left common femoral artery access  Aortogram   Left lower extremity  arteriogram  US guided placement of non tunneled central line, right IJV    CPT:  56547-55 Abdominal aortogram (no diagnostic quality arterial imaging prior   or significant change in vascular status)  74423-43 Unilateral extremity runoff arteriogram (no diagnostic quality   arterial imaging prior or significant change in vascular status)  57125 Ultrasound-guided percutaneous vascular access.  .    Surgeon:   Shirley Kurtz MD    Assistant:    None    Anesthesia:   sedation    Estimated Blood Loss:   Minimal    Findings:    Unable to obtain sheath access in right common femoral artery due to   vessel calcification and scar tissue in the groin.  Left common femoral   artery access and sheath placed and arteriograms were obtained.  Wire   access was lost during sheath exchange and hematoma was appreciated in   left groin.  I decided to abort the procedure and direct pressure was held   for hemostasis to the left groin for 30 mins.    Implants:    none    Specimen:   none    Complications:   none    Access:  US guided access of bilateral common femoral arteries, right 4 Fr   micropuncture, right 6 Fr    Closure:  Manual pressure for 30 minutes    Dispo:  To PACU    Indication for procedure:   57 y.o. female with occluded right common iliac artery stents with acute   limb ischemia of the right leg.  She presents today for arteriogram and   thrombolysis initiation.     Description of procedure:   Patient brought to the cath lab and positioned supine on the operating   table.  Timeout performed with all OR staff present and in agreement.    Right neck prepped and draped in standard sterile fashion.  Under US   guidance, the right IJV was accessed with a finder needle and guidewire   was advanced into the SVC.  The skin was cut then the dilator was passed   over the wire and triple lumen central line was placed into the right IJV.    This was flushed with saline and secured with sutures.  Dressings placed.   Bilateral  groins prepped and draped in standard sterile fashion.  Under US   guidance, the right common femoral artery was accessed using a   micropuncture needle and wire.  The artery was calcified and there was a   large amount of scar tissue in the groin.  I attempted to pass multiple   micropuncture catheters and dilators into the artery but was ultimately   unsuccessful in obtained right common femoral artery access.  Direct   pressure was held for hemostasis.  I proceeded to obtain access on the   left under US guidance.  The left common femoral artery was accessed and a   micropuncture catheter was placed over the wire and an angiogram was   performed that confirmed a common femoral artery puncture.  A soft 0.035   wire was advanced centrally into the infrarenal aorta, then an omniflush   catheter was advanced into the infrarenal aorta.  An aortogram was   obtained that demonstrated: patent renal arteries bilaterally, occlusion   of the right common and external iliac artery stents with reconstitution   at the distal external iliac artery, patent left common, external,   hypogastric arteries. Up and over access was obtained with an omniflush   catheter and we attempted to exchange for a rim catheter but were   unsuccessful.  I think attempted to place a TourGuide stearable sheath in   the left common femoral artery but access was lost during this maneuver.    She developed a left femoral hematoma at this time so direct pressure was   held and I elected to abort the procedure.  Direct manual pressure was   held on the left groin for >25 mins with improvement in the hematoma.  A   stat pseudoaneurysm duplex was ordered for the post operative area.      All counts were reported as correct at the conclusion of the procedure.    Patient tolerated the procedure well without any apparent complications.    Anesthesia was reversed and patient was transferred to the PACU in stable   condition.      Shirley Kurtz  MD  09/08/22       ASSESSMENT:  Arterial stent thrombosis  DM II  CAD  HTN  Right leg pain  Anxiety  PAD       PLAN:  Out of bed daily  Monitor Bps  PT/OT  Continue Plavix  Pain management  Antibiotics  Bronchodilator  Inhaled corticosteroids  Electrolytes/ glycemic control  DVT and GI prophylaxis-changed to Eliquis    Discussed with Dr. Leda Vega, APRN   9/11/2022  13:35 EDT     /;

## 2022-09-11 NOTE — PROGRESS NOTES
Name: Blossom Martin ADMIT: 2022   : 1965  PCP: Collin Low APRN    MRN: [id number] LOS: 5 days   AGE/SEX: 57 y.o. female  ROOM: [address] [place] [address]    [place], Post Op [employer]    Chief Complaint   Patient presents with   • Groin Pain     CC: Postop day 2 status post bilateral femoral cutdowns with bilateral iliac stent grafts  Subjective     57 y.o. female doing well status post bilateral femoral cutdowns with bilateral iliac stent grafts.  Patient has warm right foot and good signals on both feet.  She has no complaints.    Review of Systems feels well and is walking aggressively.    Objective     Scheduled Medications:   amLODIPine, 5 mg, Oral, Q24H  apixaban, 2.5 mg, Oral, Q12H  buPROPion XL, 450 mg, Oral, QAM  clopidogrel, 75 mg, Oral, Daily  DULoxetine, 40 mg, Oral, Daily  hydrOXYzine, 100 mg, Oral, Nightly  insulin glargine, 20 Units, Subcutaneous, Nightly  insulin lispro, 0-14 Units, Subcutaneous, 4x Daily AC & at Bedtime  isosorbide mononitrate, 60 mg, Oral, Daily  metoprolol tartrate, 100 mg, Oral, BID        Active Infusions:       As Needed Medications:  •  acetaminophen **OR** acetaminophen **OR** acetaminophen  •  dextrose  •  dextrose  •  glucagon (human recombinant)  •  heparin  •  heparin  •  HYDROmorphone  •  magnesium sulfate **OR** magnesium sulfate in D5W 1g/100mL (PREMIX)  •  ondansetron **OR** ondansetron  •  potassium chloride **OR** potassium chloride **OR** potassium chloride  •  prochlorperazine  •  promethazine  •  [COMPLETED] Insert peripheral IV **AND** sodium chloride    Vital Signs  Vital Signs   Patient Vitals for the past 24 hrs:   BP Temp Temp src Pulse Resp SpO2   22 1644 -- 98.1 °F (36.7 °C) Oral 69 14 98 %   22 1112 108/63 98.2 °F (36.8 °C) Oral 64 14 100 %   22 0750 149/83 98.1 °F (36.7 °C) Oral 73 16 98 %   22 0040 125/67 99.1 °F (37.3 °C) Oral 68 16 98 %   09/10/22 2100 174/81 97.6 °F (36.4 °C) Oral 70 16 99 %   09/10/22 1909  158/84 97.6 °F (36.4 °C) Oral 71 15 98 %   09/10/22 1700 132/65 -- -- 66 -- 98 %     Vital Signs (range)  Temp:  [97.6 °F (36.4 °C)-99.1 °F (37.3 °C)] 98.1 °F (36.7 °C)  Heart Rate:  [64-73] 69  Resp:  [14-16] 14  BP: (108-174)/(63-84) 108/63  I/O:  I/O last 3 completed shifts:  In: 3058 [P.O.:860; I.V.:2198]  Out: 2300 [Urine:2300]  I/O:     Intake/Output Summary (Last 24 hours) at 9/11/2022 1646  Last data filed at 9/11/2022 0825  Gross per 24 hour   Intake 240 ml   Output --   Net 240 ml     BMI:  Body mass index is 32.3 kg/m².    Physical Exam:  Physical Exam   Lungs clear and equal  Abdomen benign  Groins clear  Extremities with good signals    Results Review:     CBC    Results from last 7 days   Lab Units 09/11/22  0256 09/10/22  0421 09/09/22  1542 09/09/22  1201 09/09/22  0906 09/09/22  0302 09/08/22  1840 09/07/22  2347 09/06/22  1239   WBC 10*3/mm3 17.30* 16.30* 20.10*  --   --  12.80* 14.90* 12.50* 11.50*   HEMOGLOBIN g/dL 11.8* 11.9* 13.4  --   --  13.3 13.9 15.4 15.4   HEMOGLOBIN, POC g/dL  --   --   --  12.2 12.6  --   --   --   --    PLATELETS 10*3/mm3 212 219 229  --   --  259 283 317 306     BMP   Results from last 7 days   Lab Units 09/11/22  0826 09/10/22  1426 09/10/22  0421 09/09/22  0302 09/08/22  1840 09/06/22  1239   SODIUM mmol/L 135*  --  140 136 136 137   POTASSIUM mmol/L 4.0 4.1 3.4* 3.9 3.8 4.1   CHLORIDE mmol/L 101  --  108* 103 102 98   CO2 mmol/L 22.0  --  23.0 22.0 24.0 25.0   BUN mg/dL 4*  --  5* 10 12 11   CREATININE mg/dL 0.58  --  0.41* 0.53* 0.80 0.71   GLUCOSE mg/dL 313*  --  162* 290* 328* 373*     Cr Clearance Estimated Creatinine Clearance: 107.7 mL/min (by C-G formula based on SCr of 0.58 mg/dL).  Coag   Results from last 7 days   Lab Units 09/11/22  0256 09/10/22  1133 09/10/22  0421 09/10/22  0004 09/09/22  1819 09/09/22  0302 09/08/22  1840 09/07/22  2347 09/07/22  0024 09/06/22  1239   INR   --   --   --   --   --  0.97 1.00  --   --  <0.93*   APTT seconds 51.5* 60.6*  77.2* 70.6 35.6* 59.3*  --  60.4*   < > 26.2*    < > = values in this interval not displayed.     HbA1C   Lab Results   Component Value Date    HGBA1C 10.2 (H) 09/07/2022     Infection   Results from last 7 days   Lab Units 09/06/22  1739 09/06/22  1736   BLOODCX  No growth at 4 days No growth at 4 days     Radiology(recent) No radiology results for the last day    Assessment & Plan     Assessment & Plan      Right leg pain    Arterial stent thrombosis, initial encounter ([facility])    Diabetes mellitus ([facility])    CAD (coronary artery disease)    Hypertension    Anxiety disorder      57 y.o. female doing well status post revascularization of lower extremities.  Tolerating Eliquis and Plavix.  Walking well.  Elevated white count is likely reactive as she is completely afebrile and stable and it is down from earlier this week.  Strongly desires discharge tomorrow.  She will be cleared from a vascular standpoint for discharge in the morning.  She is okay for going upstairs and lifting less than 20 pounds.  She is not to drive until she is off pain medication for greater than 48 hours.  She will need to be discharged on Eliquis 2.5 mg p.o. twice daily and Plavix 75 mg daily please    Gerald Martel MD  09/11/22  16:46 EDT    Please call my office with any question: [phone]    Active Hospital Problems    Diagnosis  POA   • **Right leg pain [M79.604]  Yes   • CAD (coronary artery disease) [I25.10]  Yes   • Hypertension [I10]  Yes   • Anxiety disorder [F41.9]  Yes   • Arterial stent thrombosis, initial encounter ([facility]) [T82.538A]  Yes   • Diabetes mellitus ([facility]) [E11.9]  Yes      Resolved Hospital Problems   No resolved problems to display.

## 2022-09-11 NOTE — PLAN OF CARE
"Goal Outcome Evaluation:     Pt is a 58 y/o female who presents to [facility] s/p LE vascular surgery on 9/9 d/t PAD and worsening RLE pain. PMH significant for DM2 and HTN. At PLOF pt lives with her dght and AVIVA with 4 steps to enter her home. Pt lives downstairs, but is able to live on the main floor with bedroom/bathroom. At this time pt ambulates 120ft w/ RW and SBA. She notes improvements in RLE sensation. Pt presents with decreased activity tolerance, BLE pain, and impaired gait following sx. Pt may need a RW ordered and would benefit from HHPT pending pt progress.       Low Intensity Therapy recommended post-acute care - This is recommended as therapy feels this patient would require 2-3 visits per week. OP or HH would be the best option depending on patient's home bound status. Consider, if the patient has other  \"skilled\" needs such as wounds, IV antibiotics, etc. Combined with \"low intensity\" could also equate to a SNF. If patient is medically complex, consider LTAC.  "

## 2022-09-11 NOTE — THERAPY EVALUATION
Patient Name: Blossom Martin  : 1965    MRN: [id number]                              Today's Date: 2022       Admit Date: 2022    Visit Dx:     ICD-10-CM ICD-9-CM   1. Arterial stent thrombosis, initial encounter ([facility])  T82.868A 996.74     444.9   2. Right leg pain  M79.604 729.5   3. Occlusion of artery of leg ([facility])  I70.209 444.22   4. Acute cystitis with hematuria  N30.01 595.0     Patient Active Problem List   Diagnosis   • Arterial stent thrombosis, initial encounter ([facility])   • Right leg pain   • Diabetes mellitus ([facility])   • Hyperlipidemia   • Tobacco abuse   • CAD (coronary artery disease)   • Hypertension   • Anxiety disorder     Past Medical History:   Diagnosis Date   • CAD (coronary artery disease)    • Diabetes ([facility])    • Hypertension      Past Surgical History:   Procedure Laterality Date   • ANGIOPLASTY  2022    failed peripheral angioplasty by Dr Kurtz   • CARDIAC CATHETERIZATION N/A 2022    Procedure: Thrombolysis-peripheral;  Surgeon: Shirley Kurtz MD;  Location: [facility] CATH INVASIVE LOCATION;  Service: Cardiovascular;  Laterality: N/A;   • ILIAC ARTERY STENT Bilateral     At [facility]   • LEFT HEART CATH        General Information     Row Name 22 1136          Physical Therapy Time and Intention    Document Type evaluation  -     Mode of Treatment physical therapy  -STACEY     Row Name 22 1136          General Information    Prior Level of Function independent:;community mobility;gait;transfer;bed mobility;driving  Does not have AD  -STACEY     Existing Precautions/Restrictions fall  -STACEY     Row Name 22 1136          Living Environment    People in Home child(tramaine), adult;grandchild(tramaine)  -STACEY     Row Name 22 1136          Home Main Entrance    Number of Stairs, Main Entrance four  -STACEY     Stair Railings, Main Entrance railings safe and in good condition;other (see comments)  Unilateral HR  -STACEY     Row Name 22 1136          Stairs Within  Home, Primary    Stairs, Within Home, Primary Lives on the downstairs level, but can live on the main floor w/ bedroom and full bath  -     Number of Stairs, Within Home, Primary twelve  -     Row Name 09/11/22 1136          Cognition    Orientation Status (Cognition) oriented x 4  -     Row Name 09/11/22 1136          Safety Issues, Functional Mobility    Impairments Affecting Function (Mobility) balance;endurance/activity tolerance;sensation/sensory awareness  -           User Key  (r) = Recorded By, (t) = Taken By, (c) = Cosigned By    Initials Name Provider Type    STACEY Maeve Ramsey, PT Physical Therapist               Mobility     Row Name 09/11/22 1138          Bed Mobility    Bed Mobility supine-sit  -     Supine-Sit [place] (Bed Mobility) minimum assist (75% patient effort)  -     Assistive Device (Bed Mobility) bed rails  -     Row Name 09/11/22 1138          Sit-Stand Transfer    Sit-Stand [place] (Transfers) standby assist  -     Assistive Device (Sit-Stand Transfers) walker, front-wheeled  -     Comment, (Sit-Stand Transfer) STS from EOB; hesitant d/t LE pain  -     Row Name 09/11/22 1138          Gait/Stairs (Locomotion)    [place] Level (Gait) standby assist  -     Distance in Feet (Gait) 120ft; pt initially ambulates with step to gait, but improves to step through, forward flexed posture, decreased gait speed  -     Bilateral Gait Deviations heel strike decreased  -           User Key  (r) = Recorded By, (t) = Taken By, (c) = Cosigned By    Initials Name Provider Type    Maeve Beach PT Physical Therapist               Obj/Interventions     Row Name 09/11/22 1139          Strength Comprehensive (MMT)    Comment, General Manual Muscle Testing (MMT) Assessment BLE grossly 4/5  -     Row Name 09/11/22 1139          Balance    Balance Assessment sitting static balance;sitting dynamic balance;standing static balance;standing dynamic balance  -     Static  Sitting Balance independent  -STACEY     Dynamic Sitting Balance independent  -STACEY     Position, Sitting Balance unsupported;sitting edge of bed  -STACEY     Static Standing Balance supervision  -STACEY     Dynamic Standing Balance standby assist  -SATCEY     Position/Device Used, Standing Balance supported;walker, rolling  -     Row Name 09/11/22 1139          Sensory Assessment (Somatosensory)    Sensory Assessment (Somatosensory) other (see comments)  Pt reports improvements in RLE sensation, but still notes minimal numbness to BLE  -STACEY           User Key  (r) = Recorded By, (t) = Taken By, (c) = Cosigned By    Initials Name Provider Type    Maeve Beach, PT Physical Therapist               Goals/Plan     Row Name 09/11/22 1141          Bed Mobility Goal 1 (PT)    Activity/Assistive Device (Bed Mobility Goal 1, PT) bed mobility activities, all  -STACEY     [place] Level/Cues Needed (Bed Mobility Goal 1, PT) independent  -STACEY     Time Frame (Bed Mobility Goal 1, PT) long term goal (LTG);2 weeks  -     Row Name 09/11/22 1141          Transfer Goal 1 (PT)    Activity/Assistive Device (Transfer Goal 1, PT) sit-to-stand/stand-to-sit;bed-to-chair/chair-to-bed  -STACEY     [place] Level/Cues Needed (Transfer Goal 1, PT) independent  -STACEY     Time Frame (Transfer Goal 1, PT) long term goal (LTG);2 weeks  -     Row Name 09/11/22 1141          Gait Training Goal 1 (PT)    Activity/Assistive Device (Gait Training Goal 1, PT) gait (walking locomotion)  -STACEY     [place] Level (Gait Training Goal 1, PT) independent  -STACEY     Distance (Gait Training Goal 1, PT) 200ft  -STACEY     Time Frame (Gait Training Goal 1, PT) long term goal (LTG);2 weeks  -     Row Name 09/11/22 1141          Stairs Goal 1 (PT)    Activity/Assistive Device (Stairs Goal 1, PT) stairs, all skills  -STACEY     [place] Level/Cues Needed (Stairs Goal 1, PT) independent  -STACEY     Number of Stairs (Stairs Goal 1, PT) 4 steps  -STACEY     Time Frame (Stairs Goal 1, PT)  long term goal (LTG);2 weeks  -     Row Name 09/11/22 1141          Therapy Assessment/Plan (PT)    Planned Therapy Interventions (PT) balance training;bed mobility training;gait training;home exercise program;patient/family education;stair training;strengthening;neuromuscular re-education;transfer training  -           User Key  (r) = Recorded By, (t) = Taken By, (c) = Cosigned By    Initials Name Provider Type    Maeve Beach, KAUSHAL Physical Therapist               Clinical Impression     Row Name 09/11/22 1140          Pain    Additional Documentation Pain Scale: FACES Pre/Post-Treatment (Group)  -     Row Name 09/11/22 1140          Pain Scale: FACES Pre/Post-Treatment    Pain: FACES Scale, Pretreatment 2-->hurts little bit  -     Posttreatment Pain Rating 4-->hurts little more  -     Pain Location - Side/Orientation Bilateral  -     Pain Location lower  -     Pain Location - extremity  -     Row Name 09/11/22 1140          Plan of Care Review    Plan of Care Reviewed With patient  -     Outcome Evaluation Pt is a 58 y/o female who presents to [facility] s/p LE vascular surgery d/t PAD and worsening RLE pain. PMH significant for DM2 and HTN. At PLOF pt lives with her dght and AVIVA with 4 steps to enter her home. Pt lives downstairs, but is able to live on the main floor with bedroom/bathroom. At this time pt ambulates 120ft w/ RW and SBA. She notes improvements in RLE sensation. Pt presents with decreased activity tolerance, BLE pain, and impaired gait following sx. Pt may need a RW ordered and would benefit from HHPT pending pt progress.  -     Row Name 09/11/22 1140          Therapy Assessment/Plan (PT)    Therapy Frequency (PT) 5 times/wk  -     Predicted Duration of Therapy Intervention (PT) Until d/c  -     Row Name 09/11/22 1140          Vital Signs    O2 Delivery Pre Treatment room air  -     O2 Delivery Intra Treatment room air  -     O2 Delivery Post Treatment room air  -      Row Name 09/11/22 1140          Positioning and Restraints    Pre-Treatment Position in bed  -STACEY     Post Treatment Position bed  -STACEY     In Bed notified nsg;sitting EOB;call light within reach;encouraged to call for assist;with nsg  -STACEY           User Key  (r) = Recorded By, (t) = Taken By, (c) = Cosigned By    Initials Name Provider Type    Maeve Beach, PT Physical Therapist               Outcome Measures     Row Name 09/11/22 1142 09/11/22 0825       How much help from another person do you currently need...    Turning from your back to your side while in flat bed without using bedrails? 4  -STACEY 4  -KA    Moving from lying on back to sitting on the side of a flat bed without bedrails? 4  -STACEY 4  -KA    Moving to and from a bed to a chair (including a wheelchair)? 3  -STACEY 4  -KA    Standing up from a chair using your arms (e.g., wheelchair, bedside chair)? 3  -STACEY 4  -KA    Climbing 3-5 steps with a railing? 3  -STACEY 4  -KA    To walk in hospital room? 3  -STACEY 4  -KA    AM-PAC 6 Clicks Score (PT) 20  -STACEY 24  -KA    Highest level of mobility 6 --> Walked 10 steps or more  -STACEY 8 --> Walked 250 feet or more  -KA    Row Name 09/11/22 1142          Functional Assessment    Outcome Measure Options AM-PAC 6 Clicks Basic Mobility (PT)  -STACEY           User Key  (r) = Recorded By, (t) = Taken By, (c) = Cosigned By    Initials Name Provider Type    Glenda Deutsch LPN Licensed Nurse    Maeve Beach, PT Physical Therapist                             Physical Therapy Education                 Title: PT OT SLP Therapies (Done)     Topic: Physical Therapy (Done)     Point: Mobility training (Done)     Learning Progress Summary           Patient Acceptance, E,TB, VU by STACEY at 9/11/2022 1142                   Point: Home exercise program (Done)     Learning Progress Summary           Patient Acceptance, E,TB, VU by STACEY at 9/11/2022 1142                   Point: Body mechanics (Done)     Learning Progress Summary            Patient Acceptance, E,TB, VU by  at 9/11/2022 1142                   Point: Precautions (Done)     Learning Progress Summary           Patient Acceptance, E,TB, VU by  at 9/11/2022 1142                               User Key     Initials Effective Dates Name Provider Type Discipline    STACEY 08/23/21 -  Maeve Ramsey PT Physical Therapist PT              PT Recommendation and Plan  Planned Therapy Interventions (PT): balance training, bed mobility training, gait training, home exercise program, patient/family education, stair training, strengthening, neuromuscular re-education, transfer training  Plan of Care Reviewed With: patient  Outcome Evaluation: Pt is a 56 y/o female who presents to [facility] s/p LE vascular surgery d/t PAD and worsening RLE pain. PMH significant for DM2 and HTN. At PLOF pt lives with her dght and AVIVA with 4 steps to enter her home. Pt lives downstairs, but is able to live on the main floor with bedroom/bathroom. At this time pt ambulates 120ft w/ RW and SBA. She notes improvements in RLE sensation. Pt presents with decreased activity tolerance, BLE pain, and impaired gait following sx. Pt may need a RW ordered and would benefit from HHPT pending pt progress.     Time Calculation:    PT Charges     Row Name 09/11/22 1143             Time Calculation    Start Time 0803  -      Stop Time 0817  -      Time Calculation (min) 14 min  -STACEY      PT Received On 09/11/22  -STACEY      PT - Next Appointment 09/12/22  -STACEY      PT Goal Re-Cert Due Date 09/25/22  -            User Key  (r) = Recorded By, (t) = Taken By, (c) = Cosigned By    Initials Name Provider Type    Maeve Beach, KAUSHAL Physical Therapist              Therapy Charges for Today     Code Description Service Date Service Provider Modifiers Qty    14607131277 HC PT EVAL MOD COMPLEXITY 3 9/11/2022 Maeve Ramsey, PT GP 1          PT G-Codes  Outcome Measure Options: AM-PAC 6 Clicks Basic Mobility (PT)  AM-PAC 6 Clicks Score (PT):  20    Maeve Ramsey, PT  9/11/2022

## 2022-09-11 NOTE — PLAN OF CARE
Goal Outcome Evaluation:  Plan of Care Reviewed With: patient        Progress: improving  Outcome Evaluation: Peripheral arterial disease with rest pain, acute limb ischemia of the right lower extremity. Initial revascularization procedure aborted due to inaccessable femoral artery and development of a hematoma. Surgery was successfully done the nexyt day and pt is now POD 2 post bilateral femoral cutdowns with bilateral iliac stent grafts. She is moving around very well though slowly with wide stance. 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 Pt appears to have recovered her basic (I) mobility and ADl functioning. Recommend home at d/c. No further acute OT needs identified.

## 2022-09-11 NOTE — PROGRESS NOTES
[facility] Medicine Services Daily Progress Note    Patient Name: Blossom Martin  : 1965  MRN: [id number]  Primary Care Physician:  Collin Low APRN  Date of admission: 2022      Subjective      Chief Complaint: Right leg pain    2022  No acute events. No new complaints      Review of Systems   All other systems reviewed and are negative.        Objective      Vitals:   Temp:  [97.6 °F (36.4 °C)-99.1 °F (37.3 °C)] 98.1 °F (36.7 °C)  Heart Rate:  [64-73] 69  Resp:  [14-16] 14  BP: (108-174)/(63-83) 108/63    Physical Exam   Vital signs reviewed.  Nursing notes reviewed.  General: well-developed and well-nourished, NAD  HEENT: NC/AT, EOMI, PERRLA  Heart: RRR. No murmur   Chest: CTAB, no w/r/r, normal respiratory effort  Abdominal: Soft. NT/ND. Bowel sounds present  Musculoskeletal: Normal ROM.  RLE > LLE erythema without warmth or induration, BLE cool to touch  Neurological: AAOx3, no focal deficits  Skin: Skin of BLE cool to touch, ruborous, no other obvious rashes or lesions.  Psychiatric: Normal mood and affect.       Result Review    Result Review:  I have personally reviewed the results from the time of this admission to 2022 19:57 EDT and agree with these findings:  [x]  Laboratory  []  Microbiology  [x]  Radiology  [x]  EKG/Telemetry   [x]  Cardiology/Vascular   []  Pathology  [x]  Old records  []  Other:  Most notable findings include:     Wounds (last 24 hours)     LDA Wound     Row Name 22 0825 22 0050 09/10/22 2018       Wound 22 Bilateral anterior groin Incision    Wound - Properties Group Placement Date: 22  -CM Present on Hospital Admission: N  -CM Side: Bilateral  -CM Orientation: anterior  -CM Location: groin  -CM Primary Wound Type: Incision  -CM    Dressing Appearance -- dry;intact  small shadow drainage on right groin  -LR --    Closure Approximated;Liquid skin adhesive  -KA Liquid skin adhesive  -LR Approximated;Liquid  skin adhesive  -LR    Base -- clean;dry  -LR --    Periwound intact;dry  -KA -- intact;dry  -LR    Drainage Amount --  slight shadow drainage on right groin  -KA -- --  slight shadow drainage on right groin  -LR    Retired Wound - Properties Group Placement Date: 09/09/22  -CM Present on Hospital Admission: N  -CM Side: Bilateral  -CM Orientation: anterior  -CM Location: groin  -CM Primary Wound Type: Incision  -CM    Retired Wound - Properties Group Date first assessed: 09/09/22  -CM Present on Hospital Admission: N  -CM Side: Bilateral  -CM Location: groin  -CM Primary Wound Type: Incision  -CM          User Key  (r) = Recorded By, (t) = Taken By, (c) = Cosigned By    Initials Name Provider Type    CM Kayla Greco, RN Registered Nurse    Nica Gandhi RN Registered Nurse    Glenda Deutsch LPN Licensed Nurse            9/6/2022 CTA abdominal aorta bilateral iliofemoral runoff  IMPRESSION:     1. Complete occlusion, previously stented right common iliac artery, age indeterminate, but potentially acute.  2. Multifocal stenoses involving the right external iliac and common femoral arteries.  3. Single vessel infrapopliteal runoff on the right by the posterior tibial artery. Embolic occlusion of the of the vessels is not excluded.  4. Moderate grade stenosis, mid left external iliac artery.  5. Long segment occlusion, left superficial femoral artery, likely chronic.  6. Moderate grade proximal left renal arterial stenosis.  7. Hepatic steatosis.   8. Numerous additional findings, both vascular and nonvascular, as described above in greater detail.      Assessment & Plan      Brief Patient Summary:  Blossom Martin is a 57 y.o. female who presented to [facility] on 9/6/2022 complaining of right lower extremity pain which is worsened over the last 48 hours.  The pain is significantly worsened with walking and is described as a cramping pain in the lower extremity as well as a hot poker burning pain in  the right groin.  The patient denies any known injury.     Review of records: Patient had a right iliac stent placed secondary to peripheral vascular disease approximately a year ago at [facility].  Decision to request these records has been made and order placed.       amLODIPine, 5 mg, Oral, Q24H  apixaban, 2.5 mg, Oral, Q12H  buPROPion XL, 450 mg, Oral, QAM  clopidogrel, 75 mg, Oral, Daily  DULoxetine, 40 mg, Oral, Daily  hydrOXYzine, 100 mg, Oral, Nightly  insulin glargine, 20 Units, Subcutaneous, Nightly  insulin lispro, 0-14 Units, Subcutaneous, 4x Daily AC & at Bedtime  isosorbide mononitrate, 60 mg, Oral, Daily  metoprolol tartrate, 100 mg, Oral, BID             Active Hospital Problems:  Active Hospital Problems    Diagnosis    • **Right leg pain    • Arterial stent thrombosis, initial encounter ([facility])    • CAD (coronary artery disease)    • Hypertension    • Anxiety disorder    • Diabetes mellitus ([facility])      Plan:   Right iliac arterial stenosis with stent occlusion and limb ischemia  Peripheral Vascular disease  --continue heparin drip until Vascular team states otherwise  --Vascular surgery following    --unable to do right leg thrombolysis in cath lab    --9/9/2022 s/p revascularization (op note reviewed)    --heparin drip discontinued    --continue Eliquis 2.5mg BID and Plavix 75mg daily  --leukocytosis -- reactive to surgery -- resolving gradually  --possible discharge on Monday    Diabetes mellitus type 2  -- Glimepiride held on admission  -- Continue Tradjenta as formulary replacement for Januvia  -- Start Lantus 20 units nightly, will adjust dose upward as needed     --Per diabetes educator, patient was on Levemir 75 units daily, until she lost insurance coverage  -- Sliding scale coverage with meals and as needed  -- Accu-Cheks before meals and at bedtime  -- HgbA1c 10.2  -- Diabetes education    CAD  Essential hypertension  -- Continue Plavix, Imdur  -- Continue metoprolol  --  Continue to monitor BP and vitals with cardiac monitoring    Anxiety  --Continue Wellbutrin, Cymbalta, Atarax    DVT prophylaxis:  Medical DVT prophylaxis orders are present.    CODE STATUS:    Level Of Support Discussed With: Patient  Code Status (Patient has no pulse and is not breathing): CPR (Attempt to Resuscitate)  Medical Interventions (Patient has pulse or is breathing): Full Support  Release to patient: Routine Release      Disposition:  I expect patient to be discharged pending clinical course and consultant recommendations.    This patient has been examined wearing appropriate Personal Protective Equipment. 09/11/22      Electronically signed by Gaby Evangelista MD, 09/11/22, 19:57 EDT.  [facility] Hospitalist Team

## 2022-09-11 NOTE — THERAPY EVALUATION
Patient Name: Blossom Martin  : 1965    MRN: [id number]                              Today's Date: 2022       Admit Date: 2022    Visit Dx:     ICD-10-CM ICD-9-CM   1. Arterial stent thrombosis, initial encounter ([facility])  T82.868A 996.74     444.9   2. Right leg pain  M79.604 729.5   3. Occlusion of artery of leg ([facility])  I70.209 444.22   4. Acute cystitis with hematuria  N30.01 595.0     Patient Active Problem List   Diagnosis   • Arterial stent thrombosis, initial encounter ([facility])   • Right leg pain   • Diabetes mellitus ([facility])   • Hyperlipidemia   • Tobacco abuse   • CAD (coronary artery disease)   • Hypertension   • Anxiety disorder     Past Medical History:   Diagnosis Date   • CAD (coronary artery disease)    • Diabetes ([facility])    • Hypertension      Past Surgical History:   Procedure Laterality Date   • ANGIOPLASTY  2022    failed peripheral angioplasty by Dr Kurtz   • CARDIAC CATHETERIZATION N/A 2022    Procedure: Thrombolysis-peripheral;  Surgeon: Shirley Kurtz MD;  Location: [facility] CATH INVASIVE LOC[facility];  Service: Cardiovascular;  Laterality: N/A;   • ILIAC ARTERY STENT Bilateral     At [facility]   • LEFT HEART CATH        General Information     Row Name 22 1324          General Information    Prior Level of Function independent:;community mobility;driving;using stairs;ADL's  pt is disabled per her report but is the CG for 2 young grandchildren in her household.  -     Existing Precautions/Restrictions fall  -     Barriers to Rehab none identified  -     Row Name 22 1324          Living Environment    People in Home child(tramaine), adult;grandchild(tramaine)  -     Row Name 22 1324          Home Main Entrance    Number of Stairs, Main Entrance four  -     Stair Railings, Main Entrance railings safe and in good condition  -     Row Name 22 1324          Stairs Within Home, Primary    Number of Stairs, Within Home, Primary other (see comments)   has basement living quarters but family clifton set her up on the main level temporarily.  -     Row Name 09/11/22 1324          Cognition    Orientation Status (Cognition) oriented x 4  -     Row Name 09/11/22 1324          Safety Issues, Functional Mobility    Impairments Affecting Function (Mobility) endurance/activity tolerance  -           User Key  (r) = Recorded By, (t) = Taken By, (c) = Cosigned By    Initials Name Provider Type     Marisol Hay OT Occupational Therapist                 Mobility/ADL's     Row Name 09/11/22 1327          Bed Mobility    Bed Mobility supine-sit  -     Supine-Sit Bryan (Bed Mobility) modified independence  -     Assistive Device (Bed Mobility) bed rails  -     Row Name 09/11/22 1327          Transfers    Transfers sit-stand transfer;toilet transfer  -     Sit-Stand Bryan (Transfers) independent  -     Bryan Level (Toilet Transfer) independent  -     Row Name 09/11/22 1327          Functional Mobility    Functional Mobility- Ind. Level independent  -[facility] Name 09/11/22 1327          Activities of Daily Living    BADL Assessment/Intervention toileting;grooming;lower body dressing  -[facility] Name 09/11/22 1327          Toileting Assessment/Training    Bryan Level (Toileting) toileting skills;modified [place]  -     Assistive Devices (Toileting) grab bar/safety frame;raised toilet seat  -[facility] Name 09/11/22 1327          Grooming Assessment/Training    Bryan Level (Grooming) oral care regimen;wash face, hands;hair care, combing/brushing;independent  -     Position (Grooming) sink side;unsupported standing  -[facility] Name 09/11/22 1327          Lower Body Dressing Assessment/Training    Bryan Level (Lower Body Dressing) don;socks;modified [place]  -     Position (Lower Body Dressing) edge of bed sitting  -           User Key  (r) = Recorded By, (t) = Taken By, (c) = Cosigned By    Initials  Name Provider Type     Marisol Hay OT Occupational Therapist               Obj/Interventions     [facility] Name 09/11/22 1329 09/11/22 1328       Sensory Assessment (Somatosensory)    Sensory Assessment (Somatosensory) -- sensation intact  -    Sensory Assessment has been numb in her RLE lately but sensation is now returning with pins & needles  - --    [facility] Name 09/11/22 1328          Vision Assessment/Intervention    Visual Impairment/Limitations WFL  -[facility]     Row Name 09/11/22 1328          Range of Motion Comprehensive    General Range of Motion no range of motion deficits identified  -[facility]     Row Name 09/11/22 1328          Strength Comprehensive (MMT)    General Manual Muscle Testing (MMT) Assessment no strength deficits identified  -[facility]     Row Name 09/11/22 1328          Balance    Balance Assessment sitting static balance;sitting dynamic balance;standing static balance;standing dynamic balance  -     Static Sitting Balance independent  -     Dynamic Sitting Balance independent  -     Static Standing Balance independent  -     Dynamic Standing Balance independent  -           User Key  (r) = Recorded By, (t) = Taken By, (c) = Cosigned By    Initials Name Provider Type     Marisol Hay OT Occupational Therapist               Goals/Plan    No documentation.                Clinical Impression     Row Name 09/11/22 1329          Pain Assessment    Pretreatment Pain Rating 5/10  -     Posttreatment Pain Rating 5/10  -     Pain Location - groin  -[facility] 09/11/22 1330          Plan of Care Review    Plan of Care Reviewed With patient  -     Progress improving  -     Outcome Evaluation Peripheral arterial disease with rest pain, acute limb ischemia of the right lower extremity. Initial revascularization procedure aborted due to inaccessable femoral artery and development of a hematoma. Surgery was successfully done the nexyt day and pt is now POD 2 post bilateral femoral cutdowns with  bilateral iliac stent grafts. She is moving around very well though slowly with wide stance. 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 Pt appears to have recovered her basic (I) mobility and ADl functioning. Recommend home at d/c. No further acute OT needs identified.  -     Row Name 09/11/22 1330          Therapy Assessment/Plan (OT)    Therapy Frequency (OT) evaluation only  -     Row Name 09/11/22 1330          Vital Signs    O2 Delivery Pre Treatment room air  -     O2 Delivery Intra Treatment room air  -MH     O2 Delivery Post Treatment room air  -     Pre Patient Position Supine  -     Intra Patient Position Standing  -     Post Patient Position Supine  -     Row Name 09/11/22 1330          Positioning and Restraints    Pre-Treatment Position in bed  -     Post Treatment Position bed  -     In Bed fowlers;encouraged to call for assist;call light within reach;exit alarm on  -           User Key  (r) = Recorded By, (t) = Taken By, (c) = Cosigned By    Initials Name Provider Type     Marisol Hay, OT Occupational Therapist               Outcome Measures     Row Name 09/11/22 1142 09/11/22 0825       How much help from another person do you currently need...    Turning from your back to your side while in flat bed without using bedrails? 4  -STACEY 4  -KA    Moving from lying on back to sitting on the side of a flat bed without bedrails? 4  -STACEY 4  -KA    Moving to and from a bed to a chair (including a wheelchair)? 3  -STACEY 4  -KA    Standing up from a chair using your arms (e.g., wheelchair, bedside chair)? 3  -STACEY 4  -KA    Climbing 3-5 steps with a railing? 3  -STACEY 4  -KA    To walk in hospital room? 3  -STACEY 4  -KA    AM-PAC 6 Clicks Score (PT) 20  -STACEY 24  -KA    Highest level of mobility 6 --> Walked 10 steps or more  -STACEY 8 --> Walked 250 feet or more  -FAVIAN Morales  Name 09/11/22 1142          Functional Assessment    Outcome Measure Options AM-PAC 6 Clicks Basic Mobility (PT)  -SATCEY           User Key  (r) = Recorded By, (t) = Taken By, (c) = Cosigned By    Initials Name Provider Type    Glenda Deutsch LPN Licensed Nurse    [place], KAUSHAL Physical Therapist                Occupational Therapy Education                 Title: PT OT SLP Therapies (Done)     Topic: Occupational Therapy (Done)     Point: ADL training (Done)     Description:   Instruct learner(s) on proper safety adaptation and remediation techniques during self care or transfers.   Instruct in proper use of assistive devices.              Learning Progress Summary           Patient Acceptance, E,TB, VU,DU by  at 9/11/2022 1335                   Point: Home exercise program (Done)     Description:   Instruct learner(s) on appropriate technique for monitoring, assisting and/or progressing therapeutic exercises/activities.              Learning Progress Summary           Patient Acceptance, E,TB, VU,DU by  at 9/11/2022 1335                   Point: Precautions (Done)     Description:   Instruct learner(s) on prescribed precautions during self-care and functional transfers.              Learning Progress Summary           Patient Acceptance, E,TB, VU,DU by  at 9/11/2022 1335                   Point: Body mechanics (Done)     Description:   Instruct learner(s) on proper positioning and spine alignment during self-care, functional mobility activities and/or exercises.              Learning Progress Summary           Patient Acceptance, E,TB, VU,DU by  at 9/11/2022 1335                               User Key     Initials Effective Dates Name Provider Type [facility] 06/16/21 -  Marisol Hay OT Occupational Therapist OT              OT Recommendation and Plan  Therapy Frequency (OT): evaluation only  Plan of Care Review  Plan of Care Reviewed With: patient  Progress: improving  Outcome Evaluation:  Peripheral arterial disease with rest pain, acute limb ischemia of the right lower extremity. Initial revascularization procedure aborted due to inaccessable femoral artery and development of a hematoma. Surgery was successfully done the nexyt day and pt is now POD 2 post bilateral femoral cutdowns with bilateral iliac stent grafts. She is moving around very well though slowly with wide stance. 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 Pt appears to have recovered her basic (I) mobility and ADl functioning. Recommend home at d/c. No further acute OT needs identified.     Time Calculation:    Time Calculation- OT     Row Name 09/11/22 1335             Time Calculation-     OT Start Time 0900  -      OT Stop Time 0910  -      OT Time Calculation (min) 10 min  -      Total Timed Code Minutes- OT 0 minute(s)  -      OT Received On 09/11/22  -            User Key  (r) = Recorded By, (t) = Taken By, (c) = Cosigned By    Initials Name Provider Type     Marisol Hay OT Occupational Therapist              Therapy Charges for Today     Code Description Service Date Service Provider Modifiers Qty    [id number] HC OT EVAL MOD COMPLEXITY 2 9/11/2022 Marisol Hay OT GO 1               Marisol Hay OT  9/11/2022

## 2022-09-12 ENCOUNTER — READMISSION MANAGEMENT (OUTPATIENT)
Dept: CALL CENTER | Facility: HOSPITAL | Age: 57
End: 2022-09-12

## 2022-09-12 VITALS
HEART RATE: 63 BPM | DIASTOLIC BLOOD PRESSURE: 71 MMHG | HEIGHT: 63 IN | TEMPERATURE: 98.1 F | WEIGHT: 182.32 LBS | SYSTOLIC BLOOD PRESSURE: 111 MMHG | RESPIRATION RATE: 16 BRPM | BODY MASS INDEX: 32.3 KG/M2 | OXYGEN SATURATION: 99 %

## 2022-09-12 LAB
ANION GAP SERPL CALCULATED.3IONS-SCNC: 10 MMOL/L (ref 5–15)
APTT PPP: 31 SECONDS (ref 61–76.5)
BASOPHILS # BLD AUTO: 0.1 10*3/MM3 (ref 0–0.2)
BASOPHILS NFR BLD AUTO: 0.6 % (ref 0–1.5)
BUN SERPL-MCNC: 7 MG/DL (ref 6–20)
BUN/CREAT SERPL: 10.1 (ref 7–25)
CALCIUM SPEC-SCNC: 8.6 MG/DL (ref 8.6–10.5)
CHLORIDE SERPL-SCNC: 104 MMOL/L (ref 98–107)
CO2 SERPL-SCNC: 24 MMOL/L (ref 22–29)
CREAT SERPL-MCNC: 0.69 MG/DL (ref 0.57–1)
DEPRECATED RDW RBC AUTO: 43.8 FL (ref 37–54)
EGFRCR SERPLBLD CKD-EPI 2021: 100.7 ML/MIN/1.73
EOSINOPHIL # BLD AUTO: 0.4 10*3/MM3 (ref 0–0.4)
EOSINOPHIL NFR BLD AUTO: 3 % (ref 0.3–6.2)
ERYTHROCYTE [DISTWIDTH] IN BLOOD BY AUTOMATED COUNT: 14 % (ref 12.3–15.4)
GLUCOSE BLDC GLUCOMTR-MCNC: 235 MG/DL (ref 70–105)
GLUCOSE BLDC GLUCOMTR-MCNC: 261 MG/DL (ref 70–105)
GLUCOSE SERPL-MCNC: 277 MG/DL (ref 65–99)
HCT VFR BLD AUTO: 31.2 % (ref 34–46.6)
HGB BLD-MCNC: 10.2 G/DL (ref 12–15.9)
LYMPHOCYTES # BLD AUTO: 3.7 10*3/MM3 (ref 0.7–3.1)
LYMPHOCYTES NFR BLD AUTO: 29.8 % (ref 19.6–45.3)
MCH RBC QN AUTO: 28.8 PG (ref 26.6–33)
MCHC RBC AUTO-ENTMCNC: 32.9 G/DL (ref 31.5–35.7)
MCV RBC AUTO: 87.7 FL (ref 79–97)
MONOCYTES # BLD AUTO: 1.3 10*3/MM3 (ref 0.1–0.9)
MONOCYTES NFR BLD AUTO: 10.7 % (ref 5–12)
NEUTROPHILS NFR BLD AUTO: 55.9 % (ref 42.7–76)
NEUTROPHILS NFR BLD AUTO: 6.9 10*3/MM3 (ref 1.7–7)
NRBC BLD AUTO-RTO: 0.1 /100 WBC (ref 0–0.2)
PLATELET # BLD AUTO: 205 10*3/MM3 (ref 140–450)
PMV BLD AUTO: 8.7 FL (ref 6–12)
POTASSIUM SERPL-SCNC: 3.5 MMOL/L (ref 3.5–5.2)
RBC # BLD AUTO: 3.55 10*6/MM3 (ref 3.77–5.28)
SODIUM SERPL-SCNC: 138 MMOL/L (ref 136–145)
WBC NRBC COR # BLD: 12.3 10*3/MM3 (ref 3.4–10.8)

## 2022-09-12 PROCEDURE — 63710000001 INSULIN LISPRO (HUMAN) PER 5 UNITS: Performed by: STUDENT IN AN ORGANIZED HEALTH CARE EDUCATION/TRAINING PROGRAM

## 2022-09-12 PROCEDURE — 63710000001 INSULIN LISPRO (HUMAN) PER 5 UNITS: Performed by: NURSE PRACTITIONER

## 2022-09-12 PROCEDURE — 99239 HOSP IP/OBS DSCHRG MGMT >30: CPT | Performed by: STUDENT IN AN ORGANIZED HEALTH CARE EDUCATION/TRAINING PROGRAM

## 2022-09-12 PROCEDURE — 85730 THROMBOPLASTIN TIME PARTIAL: CPT | Performed by: STUDENT IN AN ORGANIZED HEALTH CARE EDUCATION/TRAINING PROGRAM

## 2022-09-12 PROCEDURE — 82962 GLUCOSE BLOOD TEST: CPT

## 2022-09-12 PROCEDURE — 80048 BASIC METABOLIC PNL TOTAL CA: CPT | Performed by: STUDENT IN AN ORGANIZED HEALTH CARE EDUCATION/TRAINING PROGRAM

## 2022-09-12 PROCEDURE — 85025 COMPLETE CBC W/AUTO DIFF WBC: CPT | Performed by: INTERNAL MEDICINE

## 2022-09-12 RX ORDER — AMLODIPINE BESYLATE 5 MG/1
5 TABLET ORAL
Qty: 30 TABLET | Refills: 2 | Status: SHIPPED | OUTPATIENT
Start: 2022-09-13

## 2022-09-12 RX ORDER — LANCETS
1 EACH MISCELLANEOUS
Qty: 100 EACH | Refills: 2 | Status: SHIPPED | OUTPATIENT
Start: 2022-09-12 | End: 2022-09-12 | Stop reason: SDUPTHER

## 2022-09-12 RX ORDER — UBIQUINOL 100 MG
1 CAPSULE ORAL
Qty: 100 EACH | Refills: 2 | Status: SHIPPED | OUTPATIENT
Start: 2022-09-12

## 2022-09-12 RX ORDER — INSULIN LISPRO 100 [IU]/ML
4 INJECTION, SOLUTION INTRAVENOUS; SUBCUTANEOUS
Status: DISCONTINUED | OUTPATIENT
Start: 2022-09-12 | End: 2022-09-12 | Stop reason: HOSPADM

## 2022-09-12 RX ORDER — LANCETS
1 EACH MISCELLANEOUS
Qty: 100 EACH | Refills: 2 | Status: SHIPPED | OUTPATIENT
Start: 2022-09-12

## 2022-09-12 RX ORDER — CLOPIDOGREL BISULFATE 75 MG/1
75 TABLET ORAL DAILY
Qty: 30 TABLET | Refills: 2 | Status: SHIPPED | OUTPATIENT
Start: 2022-09-12

## 2022-09-12 RX ORDER — BLOOD-GLUCOSE METER
1 EACH MISCELLANEOUS
Qty: 1 KIT | Refills: 0 | Status: SHIPPED | OUTPATIENT
Start: 2022-09-12

## 2022-09-12 RX ADMIN — CLOPIDOGREL BISULFATE 75 MG: 75 TABLET ORAL at 08:04

## 2022-09-12 RX ADMIN — Medication 10 ML: at 08:04

## 2022-09-12 RX ADMIN — INSULIN LISPRO 4 UNITS: 100 INJECTION, SOLUTION INTRAVENOUS; SUBCUTANEOUS at 12:54

## 2022-09-12 RX ADMIN — METOPROLOL TARTRATE 100 MG: 50 TABLET, FILM COATED ORAL at 08:02

## 2022-09-12 RX ADMIN — ISOSORBIDE MONONITRATE 60 MG: 60 TABLET, EXTENDED RELEASE ORAL at 08:04

## 2022-09-12 RX ADMIN — DULOXETINE HYDROCHLORIDE 40 MG: 20 CAPSULE, DELAYED RELEASE ORAL at 08:04

## 2022-09-12 RX ADMIN — INSULIN LISPRO 5 UNITS: 100 INJECTION, SOLUTION INTRAVENOUS; SUBCUTANEOUS at 12:54

## 2022-09-12 RX ADMIN — INSULIN LISPRO 8 UNITS: 100 INJECTION, SOLUTION INTRAVENOUS; SUBCUTANEOUS at 08:04

## 2022-09-12 RX ADMIN — APIXABAN 2.5 MG: 2.5 TABLET, FILM COATED ORAL at 08:04

## 2022-09-12 RX ADMIN — BUPROPION HYDROCHLORIDE 450 MG: 150 TABLET, EXTENDED RELEASE ORAL at 05:49

## 2022-09-12 RX ADMIN — INSULIN LISPRO 4 UNITS: 100 INJECTION, SOLUTION INTRAVENOUS; SUBCUTANEOUS at 10:15

## 2022-09-12 RX ADMIN — POTASSIUM CHLORIDE 40 MEQ: 1500 TABLET, EXTENDED RELEASE ORAL at 06:24

## 2022-09-12 RX ADMIN — AMLODIPINE BESYLATE 5 MG: 5 TABLET ORAL at 08:04

## 2022-09-12 NOTE — CONSULTS
Diabetes Education    Patient Name:  Blossom Martin  YOB: 1965  MRN: [id number]  Admit Date:  9/6/2022        MD consult concerning test strips and lancets for glucometer. Patient being discharged and did meds to beds. Pharmacy able to give patient new Accu-chek Guide Me glucometer but stated patient would need to get test strips and lancets at her regular pharmacy due to insurance coverage. [facility] pharmacy contacted patient and stated test strips and lancets were not covered by her insurance. Contacted transition of care team who stated with Medicare part B our pharmacy could not fill for test strips and lancets. [facility] did not have information on patient's Medicare part B. Patient was called and informed that she needed to take her Medicare part B card to [facility] pharmacy to get prescription filled for lancets and test strips. Patient had Levemir 20 units at bedtime and pen needles given to her by pharmacy. Reviewed with patient that if unable to get blood sugar <200 for 3 consecutive days to notify her doctor. Patient stated that she understood. Patient has no further questions or concerns related to diabetes at this time.      Electronically signed by:  Zee Maloney RN  09/12/22 14:56 EDT

## 2022-09-12 NOTE — PLAN OF CARE
Goal Outcome Evaluation:  Plan of Care Reviewed With: patient        Progress: no change  Outcome Evaluation: Patient with minimal complaints of pain, treated per MAR. Ambulates independently.  BLE pulses doppler.  Dressing to bilateral groin dry and intact. Anticipates discharge home. Vital signs stable. Call light within reach. Care plan on going.

## 2022-09-12 NOTE — DISCHARGE INSTR - ACTIVITY
May climb stairs  Do not lift anything over 15 pounds  May drive after being off pain medication for 48 hours  Use betadine and guaze to incisions daily

## 2022-09-12 NOTE — DISCHARGE SUMMARY
[facility] Medicine Services  DISCHARGE SUMMARY    Patient Name: Blossom Martin  : 1965  MRN: [id number]    Date of Admission: 2022  Discharge Diagnosis: arterial stent thrombosus  Date of Discharge:  2022  Primary Care Physician: Collin Low APRN      Presenting Problem:   Right leg pain [M79.604]  Occlusion of artery of leg ([facility]) [I70.209]  Acute cystitis with hematuria [N30.01]    Active and Resolved Hospital Problems:  Active Hospital Problems    Diagnosis POA   • **Right leg pain [M79.604] Yes   • CAD (coronary artery disease) [I25.10] Yes   • Hypertension [I10] Yes   • Anxiety disorder [F41.9] Yes   • Arterial stent thrombosis, initial encounter ([facility]) [T82.868A] Yes   • Diabetes mellitus ([facility]) [E11.9] Yes      Resolved Hospital Problems   No resolved problems to display.         Hospital Course     Hospital Course:  Blossom Martin is a 57 y.o. female with history of prior iliac artery stents one ear ago who presented to [facility] on 2022 due to right leg pain.  CT showed right common iliac artery stent occlusion, vascular consulted.  Due to a1c of 10, diabetic educator consulted and insulin started and patient educated.  US guided artery access with micropuncture attempted by vascular on 2022 but had to be aborted due to hematoma development.  On 2022, Vascular performed open exposure of b/l common femoral arteries along with angioplasty and stenting of b/l common iliac arteries and right common and external iliac artery. Patient tolerated procedure well and was watched post op in the ICU before being downgraded to SIPS the next day.  Patient continued to recover and was cleared by vascular for discharge on 2022.  Patient eager for discharge and was discharge home on  in agreement with plan below.         DISCHARGE Follow Up Recommendations for labs and diagnostics:       - Follow up with PCP in 5-7 days  - Follow up with Dr. Kurtz  within 2 weeks  - Take Detemir 20u SC daily, patient instructed on insulin use and diabetic supplies prescribed  - Take Plavix 75mg PO daily  - Take Eliquis 2.5mg PO BID  - Patient to not lift more than 20 lbs for 6 weeks  - Norvasc 5mg PO daily      Day of Discharge     Vital Signs:  Temp:  [97.5 °F (36.4 °C)-98.1 °F (36.7 °C)] 97.5 °F (36.4 °C)  Heart Rate:  [61-80] 67  Resp:  [14] 14  BP: (116-152)/(69-83) 152/81    Physical Exam:  Physical Exam  Constitutional:       General: She is not in acute distress.     Appearance: Normal appearance. She is obese. She is not toxic-appearing.   HENT:      Head: Normocephalic and atraumatic.      Nose: Nose normal. No congestion.      Mouth/Throat:      Pharynx: Oropharynx is clear. No oropharyngeal exudate.   Eyes:      General: No scleral icterus.  Cardiovascular:      Rate and Rhythm: Normal rate and regular rhythm.      Heart sounds: No murmur heard.    No friction rub. No gallop.   Pulmonary:      Effort: No respiratory distress.      Breath sounds: No wheezing or rales.   Abdominal:      General: There is no distension.      Tenderness: There is no abdominal tenderness. There is no guarding.   Musculoskeletal:         General: No swelling or deformity.      Cervical back: Normal range of motion. No rigidity.      Right lower leg: No edema.      Left lower leg: No edema.   Skin:     Coloration: Skin is not jaundiced.      Findings: No bruising or lesion.   Neurological:      General: No focal deficit present.      Mental Status: She is alert and oriented to person, place, and time.      Motor: No weakness.   Psychiatric:         Mood and Affect: Mood normal.         Behavior: Behavior normal.         Thought Content: Thought content normal.         Judgment: Judgment normal.            Pertinent  and/or Most Recent Results     LAB RESULTS:      Lab 09/12/22  0349 09/11/22  0256 09/10/22  1133 09/10/22  0421 09/10/22  0004 09/09/22  1819 09/09/22  1542 09/09/22  1201  09/09/22  0906 09/09/22  0302 09/08/22  1840 09/07/22  2347 09/07/22  0024 09/06/22  1239   WBC 12.30* 17.30*  --  16.30*  --   --  20.10*  --   --  12.80* 14.90* 12.50*  --  11.50*   HEMOGLOBIN 10.2* 11.8*  --  11.9*  --   --  13.4  --   --  13.3 13.9 15.4  --  15.4   HEMOGLOBIN, POC  --   --   --   --   --   --   --  12.2   < >  --   --   --   --   --    HEMATOCRIT 31.2* 36.5  --  36.0  --   --  40.9  --   --  41.3 42.5 46.7*  --  46.3   HEMATOCRIT POC  --   --   --   --   --   --   --  36*   < >  --   --   --   --   --    PLATELETS 205 212  --  219  --   --  229  --   --  259 283 317  --  306   NEUTROS ABS 6.90 12.20*  --   --   --   --   --   --   --  6.90 10.00* 5.80  --  6.50   LYMPHS ABS 3.70* 3.30*  --   --   --   --   --   --   --  4.70* 3.70* 5.70*  --  4.00*   MONOS ABS 1.30* 1.40*  --   --   --   --   --   --   --  0.80 0.90 0.70  --  0.70   EOS ABS 0.40 0.30  --   --   --   --   --   --   --  0.30 0.20 0.20  --  0.20   MCV 87.7 88.6  --  85.7  --   --  85.9  --   --  86.8 87.4 87.3  --  86.4   PROTIME  --   --   --   --   --   --   --   --   --  10.0 10.3  --   --  9.3*   APTT 31.0* 51.5* 60.6* 77.2* 70.6   < >  --   --   --  59.3*  --  60.4*   < > 26.2*    < > = values in this interval not displayed.         Lab 09/12/22  0349 09/11/22  0826 09/10/22  1426 09/10/22  0421 09/09/22  0302 09/08/22  1840 09/07/22  2348   SODIUM 138 135*  --  140 136 136  --    POTASSIUM 3.5 4.0 4.1 3.4* 3.9 3.8  --    CHLORIDE 104 101  --  108* 103 102  --    CO2 24.0 22.0  --  23.0 22.0 24.0  --    ANION GAP 10.0 12.0  --  9.0 11.0 10.0  --    BUN 7 4*  --  5* 10 12  --    CREATININE 0.69 0.58  --  0.41* 0.53* 0.80  --    EGFR 100.7 105.0  --  114.2 107.4 85.5  --    GLUCOSE 277* 313*  --  162* 290* 328*  --    CALCIUM 8.6 9.1  --  8.7 8.6 8.8  --    HEMOGLOBIN A1C  --   --   --   --   --   --  10.2*         Lab 09/11/22  0826 09/06/22  1239   TOTAL PROTEIN 6.6 7.0   ALBUMIN 3.20* 4.30   GLOBULIN 3.4 2.7   ALT (SGPT) 14  23   AST (SGOT) 20 20   BILIRUBIN 0.6 0.3   ALK PHOS 103 101         Lab 09/09/22  0302 09/08/22  1840 09/06/22  1239   PROTIME 10.0 10.3 9.3*   INR 0.97 1.00 <0.93*             Lab 09/07/22  0024   ABO TYPING A   RH TYPING Negative   ANTIBODY SCREEN Negative         Lab 09/09/22  1201 09/09/22  0906   PH, ARTERIAL 7.330* 7.350   PCO2, ARTERIAL 45.2* 48.6*   PO2 .0* 405.0*   O2 SATURATION ART 99.0* 100.0*   HCO3 ART 24.0 26.7*   BASE EXCESS ART <0.0* 1.0     Brief Urine Lab Results  (Last result in the past 365 days)      Color   Clarity   Blood   Leuk Est   Nitrite   Protein   CREAT   Urine HCG        09/06/22 1209 Yellow   Cloudy   Trace   Moderate (2+)   Positive   Trace               Microbiology Results (last 10 days)     Procedure Component Value - Date/Time    Blood Culture - Blood, Hand, Right [[id number]]  (Normal) Collected: 09/06/22 1739    Lab Status: Final result Specimen: Blood from Hand, Right Updated: 09/11/22 1747     Blood Culture No growth at 5 days    Blood Culture - Blood, Blood, Arterial Line [[id number]]  (Normal) Collected: 09/06/22 1736    Lab Status: Final result Specimen: Blood, Arterial Line Updated: 09/11/22 1747     Blood Culture No growth at 5 days          CT Angio Abdominal Aorta Bilateral Iliofem Runoff    Result Date: 9/6/2022  Impression:  1. Complete occlusion, previously stented right common iliac artery, age indeterminate, but potentially acute. 2. Multifocal stenoses involving the right external iliac and common femoral arteries. 3. Single vessel infrapopliteal runoff on the right by the posterior tibial artery. Embolic occlusion of the of the vessels is not excluded. 4. Moderate grade stenosis, mid left external iliac artery. 5. Long segment occlusion, left superficial femoral artery, likely chronic. 6. Moderate grade proximal left renal arterial stenosis. 7. Hepatic steatosis. 8. Numerous additional findings, both vascular and nonvascular, as described above in greater  detail.  Electronically Signed By-Nohemy Steve MD On:9/6/2022 4:30 PM This report was finalized on [id number] by  Nohemy Steve MD.    XR Chest 1 View    Result Date: 9/8/2022  Impression:  1. The right neck approach central line tip terminates at the level of the right atrium. If placement is desired at the level of the cavoatrial junction, it could be retracted approximately 4 cm. 2. No pneumothorax.  Electronically Signed By-Myranda Briones MD On:9/8/2022 11:37 AM This report was finalized on [id number] by  Myranda Briones MD.      Results for orders placed during the hospital encounter of 09/06/22    Duplex Pseudoaneurysm CAR    Interpretation Summary  · No evidence of pseudoaneurysm or AV fistula in the left groin.  · Left FSA occlusion noted.      Results for orders placed during the hospital encounter of 09/06/22    Duplex Pseudoaneurysm CAR    Interpretation Summary  · No evidence of pseudoaneurysm or AV fistula in the left groin.  · Left FSA occlusion noted.          Labs Pending at Discharge:      Procedures Performed  Procedure(s):  BILATERAL COMMON ILIAC ARTERY ANGIOPLASTY AND STENT PLACEMENT, RIGHT EXTERNAL ILIAC ARTERY ANGIOPLASTY AND STENT, RIGHT EXTERNAL ILIAC ARTERY DRUG COATED BALLOON ANGIOPLASTY AND RIGHT LOWER EXTREMITY ANGIOGRAM         Consults:   Consults     Date and Time Order Name Status Description    9/10/2022  3:52 PM Inpatient Hospitalist Consult      9/6/2022  5:41 PM Hospitalist (on-call MD unless specified)      9/6/2022  4:47 PM Vascular Surgery (on-call MD unless specified) Completed             Discharge Details        Discharge Medications      New Medications      Instructions Start Date   Accu-Chek Guide Me w/Device kit   1 kit, Does not apply, 3 Times Daily Before Meals, Dx code: E11.65  NDC [address]-06  Bin#: [id number] Group #: [id number]  ID #: [id number]  Issuer #: [id number])      Accu-Chek Softclix Lancets lancets   1 each, Other, 3 Times Daily Before Meals, Use as  instructed Dx code: E11.65      Alcohol Wipes 70 % misc   1 each, Apply externally, 3 Times Daily Before Meals, Dx code: E11.65      amLODIPine 5 MG tablet  Commonly known as: NORVASC   5 mg, Oral, Every 24 Hours Scheduled   Start Date: September 13, 2022     apixaban 2.5 MG tablet tablet  Commonly known as: ELIQUIS   2.5 mg, Oral, Every 12 Hours Scheduled      glucose blood test strip  Commonly known as: Accu-Chek Guide   1 each, Other, 3 Times Daily Before Meals, Use as instructed Dx code: E11.65      insulin detemir 100 UNIT/ML injection  Commonly known as: LEVEMIR   20 Units, Subcutaneous, Daily, Dx code: E11.65      Insulin Pen Needle 32G X 4 MM misc   1 each, Does not apply, Daily, Dx code: E11.65         Continue These Medications      Instructions Start Date   buPROPion  MG 24 hr tablet  Commonly known as: FORFIVO XL   450 mg, Oral, Every Morning      clopidogrel 75 MG tablet  Commonly known as: PLAVIX   75 mg, Oral, Daily      DULoxetine 20 MG capsule  Commonly known as: CYMBALTA   40 mg, Oral, Daily      glimepiride 2 MG tablet  Commonly known as: AMARYL   2 mg, Oral, Daily      hydrOXYzine 50 MG tablet  Commonly known as: ATARAX   100 mg, Oral, Nightly      isosorbide mononitrate 60 MG 24 hr tablet  Commonly known as: IMDUR   60 mg, Oral, Daily      metoprolol tartrate 100 MG tablet  Commonly known as: LOPRESSOR   100 mg, Oral, 2 Times Daily      SITagliptin 100 MG tablet  Commonly known as: JANUVIA   100 mg, Oral, Daily      Vitamin D3 1.25 MG (05661 UT) capsule   50,000 Units, Oral, Every 7 Days, Wednesdays             Allergies   Allergen Reactions   • Aspirin GI Intolerance and Other (See Comments)     Nausea and vomiting      • Hydrocodone Itching         Discharge Disposition: Home  Home or Self Care   Condition on discharge: GOOD      Diet:  Hospital:  Diet Order   Procedures   • Diet Regular, Diabetic/Consistent Carbs, Cardiac; Healthy Heart; Diabetic - Consistent Carb         Discharge  Activity:   Activity Instructions     Activity as Tolerated              CODE STATUS:  Code Status and Medical Interventions:   Ordered at: 09/06/22 2241     Level Of Support Discussed With:    Patient     Code Status (Patient has no pulse and is not breathing):    CPR (Attempt to Resuscitate)     Medical Interventions (Patient has pulse or is breathing):    Full Support     Release to patient:    Routine Release         No future appointments.    Additional Instructions for the Follow-ups that You Need to Schedule     Call MD With Problems / Concerns   As directed      Instructions: Call [phone] or email [email or web address] for problems or concerns.    Order Comments: Instructions: Call [phone] or email [email or web address] for problems or concerns.          Discharge Follow-up with PCP   As directed       Currently Documented PCP:    Collin Low APRN    PCP Phone Number:    [phone]     Follow Up Details: follow up with PCP in 5-7 days         Discharge Follow-up with Specified Provider: Dr. Kurtz; 2 Weeks   As directed      To: Dr. Kurtz    Follow Up: 2 Weeks               Time spent on Discharge including face to face service:  35 minutes    This patient has been examined wearing appropriate Personal Protective Equipment and discussed with Patient. 09/12/22      Signature: Electronically signed by Jase Buchanan DO, 09/12/22, 11:52 AM EDT.

## 2022-09-12 NOTE — PROGRESS NOTES
Name: Blossom Martin ADMIT: 2022   : 1965  PCP: Collin Low APRN    MRN: [id number] LOS: 6 days   AGE/SEX: 57 y.o. female  ROOM: [address] [place] [address]    [place], Post Op Global    Chief Complaint   Patient presents with   • Groin Pain     CC: Postop day #3 s/p bilateral femoral cutdowns with bilateral iliac stent grafts    Subjective     57 y.o. female who is doing well status post bilateral femoral cutdowns with bilateral iliac stent grafts on 22 by Dr. Kurtz.  Patient has warm right foot and good signals on both feet.  She has no complaints and is ready to discharge home.      Review of Systems   Constitutional: Negative for chills and fever.   Respiratory: Negative for cough and shortness of breath.    Cardiovascular: Negative for chest pain and palpitations.   Gastrointestinal: Negative for abdominal distention and abdominal pain.   Skin: Positive for wound. Negative for color change.   Neurological: Negative for dizziness and light-headedness.   Psychiatric/Behavioral: Negative for agitation and confusion.     Objective  resting in bed, NAD, no family present     Scheduled Medications:   amLODIPine, 5 mg, Oral, Q24H  apixaban, 2.5 mg, Oral, Q12H  buPROPion XL, 450 mg, Oral, QAM  clopidogrel, 75 mg, Oral, Daily  DULoxetine, 40 mg, Oral, Daily  hydrOXYzine, 100 mg, Oral, Nightly  insulin glargine, 20 Units, Subcutaneous, Nightly  insulin lispro, 0-14 Units, Subcutaneous, 4x Daily AC & at Bedtime  insulin lispro, 4 Units, Subcutaneous, TID With Meals  isosorbide mononitrate, 60 mg, Oral, Daily  metoprolol tartrate, 100 mg, Oral, BID      Active Infusions:       As Needed Medications:  •  acetaminophen **OR** acetaminophen **OR** acetaminophen  •  dextrose  •  dextrose  •  glucagon (human recombinant)  •  HYDROmorphone  •  magnesium sulfate **OR** magnesium sulfate in D5W 1g/100mL (PREMIX)  •  ondansetron **OR** ondansetron  •  potassium chloride **OR** potassium chloride **OR**  potassium chloride  •  prochlorperazine  •  promethazine  •  [COMPLETED] Insert peripheral IV **AND** sodium chloride    Vital Signs  Vital Signs Patient Vitals for the past 24 hrs:   BP Temp Temp src Pulse Resp SpO2   09/12/22 0802 152/81 -- -- 67 -- --   09/12/22 0546 120/83 97.5 °F (36.4 °C) Oral 61 14 99 %   09/11/22 2146 116/69 98.1 °F (36.7 °C) Oral 80 14 98 %   09/11/22 1644 -- 98.1 °F (36.7 °C) Oral 69 14 98 %   09/11/22 1112 108/63 98.2 °F (36.8 °C) Oral 64 14 100 %     I/O:  I/O last 3 completed shifts:  In: 360 [P.O.:360]  Out: -     Physical Exam:  Physical Exam  Constitutional:       Appearance: Normal appearance.   HENT:      Head: Normocephalic.      Mouth/Throat:      Mouth: Mucous membranes are moist.   Eyes:      Extraocular Movements: Extraocular movements intact.      Pupils: Pupils are equal, round, and reactive to light.   Cardiovascular:      Pulses:           Dorsalis pedis pulses are detected w/ Doppler on the right side and detected w/ Doppler on the left side.        Posterior tibial pulses are detected w/ Doppler on the right side and detected w/ Doppler on the left side.   Pulmonary:      Effort: Pulmonary effort is normal.   Abdominal:      General: Abdomen is flat.      Palpations: Abdomen is soft.   Skin:     General: Skin is warm and dry.      Capillary Refill: Capillary refill takes less than 2 seconds.      Comments: Bilateral femoral sites clean/dry/intact with expected post-op bruising    Neurological:      General: No focal deficit present.      Mental Status: She is alert and oriented to person, place, and time.   Psychiatric:         Mood and Affect: Mood normal.         Behavior: Behavior normal.        Results Review:     CBC    Results from last 7 days   Lab Units 09/12/22  0349 09/11/22  0256 09/10/22  0421 09/09/22  1542 09/09/22  1201 09/09/22  0906 09/09/22  0302 09/08/22  1840 09/07/22  2347   WBC 10*3/mm3 12.30* 17.30* 16.30* 20.10*  --   --  12.80* 14.90* 12.50*    HEMOGLOBIN g/dL 10.2* 11.8* 11.9* 13.4  --   --  13.3 13.9 15.4   HEMOGLOBIN, POC g/dL  --   --   --   --  12.2 12.6  --   --   --    PLATELETS 10*3/mm3 205 212 219 229  --   --  259 283 317     BMP   Results from last 7 days   Lab Units 09/12/22  0349 09/11/22  0826 09/10/22  1426 09/10/22  0421 09/09/22  0302 09/08/22  1840 09/06/22  1239   SODIUM mmol/L 138 135*  --  140 136 136 137   POTASSIUM mmol/L 3.5 4.0 4.1 3.4* 3.9 3.8 4.1   CHLORIDE mmol/L 104 101  --  108* 103 102 98   CO2 mmol/L 24.0 22.0  --  23.0 22.0 24.0 25.0   BUN mg/dL 7 4*  --  5* 10 12 11   CREATININE mg/dL 0.69 0.58  --  0.41* 0.53* 0.80 0.71   GLUCOSE mg/dL 277* 313*  --  162* 290* 328* 373*       Assessment & Plan     Assessment & Plan      Right leg pain    Arterial stent thrombosis, initial encounter ([facility])    Diabetes mellitus ([facility])    CAD (coronary artery disease)    Hypertension    Anxiety disorder      57 y.o. female doing well status post bilateral femoral cutdowns with bilateral iliac stent grafts on 9/9/22 by Dr. Kurtz. She is tolerating Eliquis and Plavix.  Walking well.  Elevated white count is likely reactive and continues to improve and is now 12.3 today, previously 17 yesterday. She remains afebrile. Okay for discharge today from vascular standpoint.  She is okay for going upstairs and lifting less than 20 pounds.  She is not to drive until she is off pain medication for greater than 48 hours.  She will need to be discharged on Eliquis 2.5 mg p.o. twice daily and Plavix 75 mg daily please. She will need post-op follow up in our office in 2 weeks, message has been sent to arrange this.       SOREN Isaacs  09/12/22  09:15 EDT    Please call my office with any question: [phone]    Active Hospital Problems    Diagnosis  POA   • **Right leg pain [M79.604]  Yes   • CAD (coronary artery disease) [I25.10]  Yes   • Hypertension [I10]  Yes   • Anxiety disorder [F41.9]  Yes   • Arterial stent thrombosis, initial  encounter ([facility]) [T82.557D]  Yes   • Diabetes mellitus (HCC) [E11.9]  Yes      Resolved Hospital Problems   No resolved problems to display.

## 2022-09-12 NOTE — PROGRESS NOTES
Daily Progress Note        Right leg pain    Arterial stent thrombosis, initial encounter (HCC)    Diabetes mellitus (HCC)    CAD (coronary artery disease)    Hypertension    Anxiety disorder      Assessment  Arterial stent thrombosis  DM II  CAD  HTN  Right leg pain  Anxiety  PAD  Non-smoker    9/8/2022 ultrasound-guided right arteriogram and thrombolysis    9/9/2022 bilateral common iliac artery angioplasty and stent placement, right external iliac artery angioplasty and stent, right external iliac artery drug-coated balloon angioplasty and right lower extremity angiogram      Plan  May discharge from pulmonary standpoint, but if does not discharge will continue to monitor for  other complications.  Follow-up in our office in 4 weeks.    Currently oxygenating well on room air. Continue to monitor O2 saturation.  Electrolyte/Glycemic control  DVT Prophylaxis-Eliquis and actively walking in room.  IS q2hrs while awake-encouraged to continue once discharged home  PT following    9/8/2022    CT angio abdominal aorta bilaterally iliofemoral runoff 9/6/2022       LOS: 6 days     Subjective     Patient is up ambulating independently.  She denies shortness of breath or cough.  Reports ready to go home today.    Objective     Vital signs for last 24 hours:  Vitals:    09/11/22 1644 09/11/22 2146 09/12/22 0546 09/12/22 0802   BP:  116/69 120/83 152/81   BP Location:  Left arm Left arm    Patient Position:  Lying Lying    Pulse: 69 80 61 67   Resp: 14 14 14    Temp: 98.1 °F (36.7 °C) 98.1 °F (36.7 °C) 97.5 °F (36.4 °C)    TempSrc: Oral Oral Oral    SpO2: 98% 98% 99%    Weight:       Height:           Intake/Output last 3 shifts:  I/O last 3 completed shifts:  In: 360 [P.O.:360]  Out: -   Intake/Output this shift:  I/O this shift:  In: 120 [P.O.:120]  Out: -       Radiology  Imaging Results (Last 24 Hours)       ** No results found for the last 24 hours. **            Labs:  Results from last 7 days   Lab Units 09/12/22  034    WBC 10*3/mm3 12.30*   HEMOGLOBIN g/dL 10.2*   HEMATOCRIT % 31.2*   PLATELETS 10*3/mm3 205     Results from last 7 days   Lab Units 09/12/22  0349 09/11/22  0826   SODIUM mmol/L 138 135*   POTASSIUM mmol/L 3.5 4.0   CHLORIDE mmol/L 104 101   CO2 mmol/L 24.0 22.0   BUN mg/dL 7 4*   CREATININE mg/dL 0.69 0.58   CALCIUM mg/dL 8.6 9.1   BILIRUBIN mg/dL  --  0.6   ALK PHOS U/L  --  103   ALT (SGPT) U/L  --  14   AST (SGOT) U/L  --  20   GLUCOSE mg/dL 277* 313*     Results from last 7 days   Lab Units 09/09/22  1201   PH, ARTERIAL pH units 7.330*   PO2 ART mm Hg 177.0*   PCO2, ARTERIAL mm Hg 45.2*   HCO3 ART mmol/L 24.0     Results from last 7 days   Lab Units 09/11/22  0826 09/06/22  1239   ALBUMIN g/dL 3.20* 4.30                 Results from last 7 days   Lab Units 09/12/22  0349 09/11/22  0256 09/10/22  1133 09/09/22  1819 09/09/22  0302 09/08/22  1840 09/07/22  0024 09/06/22  1239   INR   --   --   --   --  0.97 1.00  --  <0.93*   APTT seconds 31.0* 51.5* 60.6*   < > 59.3*  --    < > 26.2*    < > = values in this interval not displayed.               Meds:   SCHEDULE  amLODIPine, 5 mg, Oral, Q24H  apixaban, 2.5 mg, Oral, Q12H  buPROPion XL, 450 mg, Oral, QAM  clopidogrel, 75 mg, Oral, Daily  DULoxetine, 40 mg, Oral, Daily  hydrOXYzine, 100 mg, Oral, Nightly  insulin glargine, 20 Units, Subcutaneous, Nightly  insulin lispro, 0-14 Units, Subcutaneous, 4x Daily AC & at Bedtime  insulin lispro, 4 Units, Subcutaneous, TID With Meals  isosorbide mononitrate, 60 mg, Oral, Daily  metoprolol tartrate, 100 mg, Oral, BID      Infusions     PRNs    acetaminophen **OR** acetaminophen **OR** acetaminophen    dextrose    dextrose    glucagon (human recombinant)    HYDROmorphone    magnesium sulfate **OR** magnesium sulfate in D5W 1g/100mL (PREMIX)    ondansetron **OR** ondansetron    potassium chloride **OR** potassium chloride **OR** potassium chloride    prochlorperazine    promethazine    [COMPLETED] Insert peripheral IV **AND**  sodium chloride    Physical Exam:  Physical Exam  General Appearance:  Alert. No distress noted.   HEENT:  Normocephalic, without obvious abnormality, Conjunctiva/corneas clear, Normal external ear canals, Nares normal, no drainage     Neck:  Supple, symmetrical, trachea midline. No JVD.  Lungs /Chest wall: CTA bilaterally, respirations unlabored symmetrical wall movement.     Heart:  Regular rate and rhythm, systolic murmur PMI left sternal border  Abdomen: Soft, non-tender, no masses, no organomegaly.    Extremities: No edema, no clubbing or cyanosis.  Ambulating in room independently.    ROS  Review of Systems  Constitutional: Negative for chills, fever and malaise/fatigue.   HENT: Negative.    Eyes: Negative.    Cardiovascular: Negative.    Respiratory: Negative.  Skin: Negative.    Musculoskeletal: Some right leg discomfort.  Gastrointestinal: Negative.    Genitourinary: Negative.    Neurological: Negative.    Psychiatric/Behavioral: Negative.        I have reviewed current clinicals.     Electronically signed by SOREN Young, 09/12/22, 9:50 AM EDT.     The NP scribed the note for me, I have examined the patient and reviewed and verified the above findings and and I formulated the assessment and plan as documented

## 2022-09-12 NOTE — PLAN OF CARE
"Goal Outcome Evaluation:         Patient will be discharged home and will follow up with dr. Kurtz in 2 weeks. Patient is discharged home on levemir and denies needing to speak with the diabetic educator at this time states \"I have been on it before and know what i'm doing\". Education provided to patient.        "

## 2022-09-13 NOTE — CASE MANAGEMENT/SOCIAL WORK
Case Management Discharge Note      Final Note: home         Selected Continued Care - Discharged on 9/12/2022 Admission date: 9/6/2022 - Discharge disposition: Home or Self Care            Transportation Services  Private: Car    Final Discharge Disposition Code: 01 - home or self-care

## 2022-09-13 NOTE — OUTREACH NOTE
Prep Survey    Flowsheet Row Responses   [facility] patient discharged from? [facility]   Is LACE score < 7 ? No   Emergency Room discharge w/ pulse ox? No   Eligibility Readm Mgmt   Discharge diagnosis BILATERAL COMMON ILIAC ARTERY ANGIOPLASTY AND STENT PLACEMENT   Does the patient have one of the following disease processes/diagnoses(primary or secondary)? General Surgery   Does the patient have Home health ordered? No   Is there a DME ordered? No   Prep survey completed? Yes          KLARISSA VENTURA - Registered Nurse

## 2022-09-14 ENCOUNTER — READMISSION MANAGEMENT (OUTPATIENT)
Dept: CALL CENTER | Facility: HOSPITAL | Age: 57
End: 2022-09-14

## 2022-09-14 NOTE — OUTREACH NOTE
General Surgery Week 1 Survey    Flowsheet Row Responses   [facility] patient discharged from? [facility]   Does the patient have one of the following disease processes/diagnoses(primary or secondary)? General Surgery   Week 1 attempt successful? Yes   Call start time 1053   Call end time 1054   Discharge diagnosis BILATERAL COMMON ILIAC ARTERY ANGIOPLASTY AND STENT PLACEMENT   Does the patient have all medications related to this admission filled (includes all antibiotics, pain medications, etc.) Yes   Is the patient taking all medications as directed (includes completed medication regime)? Yes   Does the patient have a follow up appointment scheduled with their surgeon? Yes   Has the patient kept scheduled appointments due by today? N/A   Comments will see PCP next week,  f/u with surgeon on 9/29   Has home health visited the patient within 72 hours of discharge? N/A   Psychosocial issues? No   Did the patient receive a copy of their discharge instructions? Yes   What is the patient's perception of their health status since discharge? Improving   Nursing interventions Nurse provided patient education  [advised to follow discharge instructions]   Is the patient/caregiver able to teach back signs and symptoms of incisional infection? Fever, Pus or odor from incision, Incisional warmth, Increased drainage or bleeding, Increased redness, swelling or pain at the incisonal site   Is the patient/caregiver able to teach back the hierarchy of who to call/visit for symptoms/problems? PCP, Specialist, Home health nurse, Urgent Care, ED, 911 Yes   Week 1 call completed? Yes   Wrap up additional comments Doing well, no questions.          ALLYSSA WHITE - Registered Nurse

## 2022-09-16 ENCOUNTER — HOSPITAL ENCOUNTER (EMERGENCY)
Facility: HOSPITAL | Age: 57
Discharge: HOME | End: 2022-09-16
Attending: EMERGENCY MEDICINE | Admitting: EMERGENCY MEDICINE

## 2022-09-16 ENCOUNTER — APPOINTMENT (OUTPATIENT)
Dept: CT IMAGING | Facility: HOSPITAL | Age: 57
End: 2022-09-16

## 2022-09-16 VITALS
DIASTOLIC BLOOD PRESSURE: 84 MMHG | SYSTOLIC BLOOD PRESSURE: 154 MMHG | TEMPERATURE: 98 F | HEART RATE: 72 BPM | BODY MASS INDEX: 32.25 KG/M2 | OXYGEN SATURATION: 100 % | HEIGHT: 63 IN | WEIGHT: 182 LBS | RESPIRATION RATE: 18 BRPM

## 2022-09-16 DIAGNOSIS — I97.638 POSTOPERATIVE HEMATOMA INVOLVING CIRCULATORY SYSTEM FOLLOWING OTHER CIRCULATORY SYSTEM PROCEDURE: Primary | ICD-10-CM

## 2022-09-16 LAB
ANION GAP SERPL CALCULATED.3IONS-SCNC: 12.4 MMOL/L (ref 5–15)
BASOPHILS # BLD AUTO: 0.05 10*3/MM3 (ref 0–0.2)
BASOPHILS NFR BLD AUTO: 0.4 % (ref 0–1.5)
BUN SERPL-MCNC: 6 MG/DL (ref 6–20)
BUN/CREAT SERPL: 10.5 (ref 7–25)
CALCIUM SPEC-SCNC: 9.2 MG/DL (ref 8.6–10.5)
CHLORIDE SERPL-SCNC: 99 MMOL/L (ref 98–107)
CO2 SERPL-SCNC: 24.6 MMOL/L (ref 22–29)
CREAT SERPL-MCNC: 0.57 MG/DL (ref 0.57–1)
DEPRECATED RDW RBC AUTO: 42.1 FL (ref 37–54)
EGFRCR SERPLBLD CKD-EPI 2021: 105.5 ML/MIN/1.73
EOSINOPHIL # BLD AUTO: 0.32 10*3/MM3 (ref 0–0.4)
EOSINOPHIL NFR BLD AUTO: 2.6 % (ref 0.3–6.2)
ERYTHROCYTE [DISTWIDTH] IN BLOOD BY AUTOMATED COUNT: 13 % (ref 12.3–15.4)
GLUCOSE SERPL-MCNC: 241 MG/DL (ref 65–99)
HCT VFR BLD AUTO: 38 % (ref 34–46.6)
HGB BLD-MCNC: 12.3 G/DL (ref 12–15.9)
IMM GRANULOCYTES # BLD AUTO: 0.1 10*3/MM3 (ref 0–0.05)
IMM GRANULOCYTES NFR BLD AUTO: 0.8 % (ref 0–0.5)
LYMPHOCYTES # BLD AUTO: 3.64 10*3/MM3 (ref 0.7–3.1)
LYMPHOCYTES NFR BLD AUTO: 29.1 % (ref 19.6–45.3)
MCH RBC QN AUTO: 28.5 PG (ref 26.6–33)
MCHC RBC AUTO-ENTMCNC: 32.4 G/DL (ref 31.5–35.7)
MCV RBC AUTO: 88.2 FL (ref 79–97)
MONOCYTES # BLD AUTO: 0.88 10*3/MM3 (ref 0.1–0.9)
MONOCYTES NFR BLD AUTO: 7 % (ref 5–12)
NEUTROPHILS NFR BLD AUTO: 60.1 % (ref 42.7–76)
NEUTROPHILS NFR BLD AUTO: 7.51 10*3/MM3 (ref 1.7–7)
PLATELET # BLD AUTO: 490 10*3/MM3 (ref 140–450)
PMV BLD AUTO: 10 FL (ref 6–12)
POTASSIUM SERPL-SCNC: 3.7 MMOL/L (ref 3.5–5.2)
RBC # BLD AUTO: 4.31 10*6/MM3 (ref 3.77–5.28)
SODIUM SERPL-SCNC: 136 MMOL/L (ref 136–145)
WBC NRBC COR # BLD: 12.5 10*3/MM3 (ref 3.4–10.8)

## 2022-09-16 PROCEDURE — 80048 BASIC METABOLIC PNL TOTAL CA: CPT | Performed by: EMERGENCY MEDICINE

## 2022-09-16 PROCEDURE — 74176 CT ABD & PELVIS W/O CONTRAST: CPT

## 2022-09-16 PROCEDURE — 99282 EMERGENCY DEPT VISIT SF MDM: CPT

## 2022-09-16 PROCEDURE — 85025 COMPLETE CBC W/AUTO DIFF WBC: CPT | Performed by: EMERGENCY MEDICINE

## 2022-09-16 PROCEDURE — 36415 COLL VENOUS BLD VENIPUNCTURE: CPT

## 2022-09-16 PROCEDURE — 99284 EMERGENCY DEPT VISIT MOD MDM: CPT | Performed by: EMERGENCY MEDICINE

## 2022-09-16 NOTE — ED PROVIDER NOTES
Subjective   History of Present Illness  Patient states that she had some drainage status post surgery of what appears to be stents that were placed [place] by [place] popliteal bypass.  The patient has intermittent drainage of pus she states.  The patient also has sob cutaneous hematomas they are firm they are not fluctuant I do not think those are abscesses.  The drainage is coming from the superior portion of the left incision where there is a small 1 cm the dehiscence of just the skin.  Patient denies any fevers or chills abdominal pain nausea or vomiting she states the circulation in her legs are great her legs still hurt from all that blood pushing through where it never did before.        Review of Systems   All other systems reviewed and are negative.      Past Medical History:   Diagnosis Date   • CAD (coronary artery disease)    • Diabetes ([facility])    • DVT (deep venous thrombosis) ([facility])    • Hypertension        Allergies   Allergen Reactions   • Aspirin GI Intolerance and Other (See Comments)     Nausea and vomiting      • Hydrocodone Itching       Past Surgical History:   Procedure Laterality Date   • ANGIOPLASTY  09/08/2022    failed peripheral angioplasty by Dr Kurtz   • ANGIOPLASTY ILIAC ARTERY Bilateral 9/9/2022    Procedure: BILATERAL COMMON ILIAC ARTERY ANGIOPLASTY AND STENT PLACEMENT, RIGHT EXTERNAL ILIAC ARTERY ANGIOPLASTY AND STENT, RIGHT EXTERNAL ILIAC ARTERY DRUG COATED BALLOON ANGIOPLASTY AND RIGHT LOWER EXTREMITY ANGIOGRAM;  Surgeon: Shirley Kurtz MD;  Location: [facility] MAIN OR;  Service: Vascular;  Laterality: Bilateral;   • CARDIAC CATHETERIZATION N/A 9/8/2022    Procedure: Thrombolysis-peripheral;  Surgeon: Shirley Kurtz MD;  Location: [facility] CATH INVASIVE LOCATION;  Service: Cardiovascular;  Laterality: N/A;   • ILIAC ARTERY STENT Bilateral     At [facility]   • LEFT HEART CATH         Family History   Problem Relation Age of Onset   • No Known Problems Mother    • No Known  Problems Father        Social History     Socioeconomic History   • Marital status:    Tobacco Use   • Smoking status: Never Smoker   • Smokeless tobacco: Never Used   Vaping Use   • Vaping Use: Never used   Substance and Sexual Activity   • Alcohol use: Never   • Drug use: Never   • Sexual activity: Defer           Objective   Physical Exam  Vitals and nursing note reviewed.   Constitutional:       Appearance: Normal appearance.   HENT:      Head: Normocephalic and atraumatic.      Nose: Nose normal.      Mouth/Throat:      Mouth: Mucous membranes are moist.   Eyes:      Extraocular Movements: Extraocular movements intact.      Pupils: Pupils are equal, round, and reactive to light.   Cardiovascular:      Rate and Rhythm: Normal rate and regular rhythm.      Pulses: Normal pulses.      Heart sounds: Normal heart sounds.   Pulmonary:      Effort: Pulmonary effort is normal.      Breath sounds: Normal breath sounds.   Abdominal:      General: Abdomen is flat. There is no distension.      Palpations: Abdomen is soft.      Tenderness: There is abdominal tenderness.      Comments: Positive tenderness along the incision  The patient also has sob cutaneous hematomas they are firm they are not fluctuant I do not think those are abscesses.  The drainage is coming from the superior portion of the left incision where there is a small 1 cm the dehiscence of just the skin.     Musculoskeletal:      Cervical back: Normal range of motion and neck supple.   Skin:     General: Skin is warm.      Capillary Refill: Capillary refill takes less than 2 seconds.      Coloration: Skin is not pale.      Findings: Bruising present.   Neurological:      General: No focal deficit present.      Mental Status: She is alert and oriented to person, place, and time.   Psychiatric:         Mood and Affect: Mood normal.         Behavior: Behavior normal.         Procedures           ED Course                                            MDM  Number of Diagnoses or Management Options  Diagnosis management comments: 1. Small air-fluid collections are seen in the bilateral inguinal  regions, which are favored to represent postoperative seromas or  liquefied hematomas. There is a dominant hyperdense collection in the  left inguinal region, contiguous to the common femoral artery and vein,  favored to represent a hematoma. No intraperitoneal or retroperitoneal  hematoma or abscess is seen within the abdominal or pelvic cavities  proper.  2. Aortobiiliac stents and right external iliac artery stents are  present. Patency of vessels cannot be assessed without IV contrast.  3. Hepatic steatosis.  4. Small rectocele.    Therefore with these findings and the patient looking so well and the drainage simply being a almost clear yellow liquid this is just normal drainage it is not pus.  And nor does the CT scan leads me to believe there is anything sinister going on.  She will be instructed to return if fever greater than 100.4 but otherwise everything looks good.      Final diagnoses:   Postoperative hematoma involving circulatory system following other circulatory system procedure       ED Disposition  ED Disposition     ED Disposition   Discharge    Condition   Stable    Comment   --             Collin Low, APRN  [address] [address]  [phone]    In 1 week  As needed         Medication List      No changes were made to your prescriptions during this visit.          Rodolfo Bowden MD  09/16/22 7572

## 2022-09-22 ENCOUNTER — READMISSION MANAGEMENT (OUTPATIENT)
Dept: CALL CENTER | Facility: HOSPITAL | Age: 57
End: 2022-09-22

## 2022-09-22 NOTE — OUTREACH NOTE
General Surgery Week 2 Survey    Flowsheet Row Responses   [facility] patient discharged from? [facility]   Does the patient have one of the following disease processes/diagnoses(primary or secondary)? General Surgery   Week 2 attempt successful? Yes   Call start time 1238   Call end time 1241   Discharge diagnosis BILATERAL COMMON ILIAC ARTERY ANGIOPLASTY AND STENT PLACEMENT   Meds reviewed with patient/caregiver? Yes   Is the patient having any side effects they believe may be caused by any medication additions or changes? No   Does the patient have all medications related to this admission filled (includes all antibiotics, pain medications, etc.) Yes   Is the patient taking all medications as directed (includes completed medication regime)? Yes   Does the patient have a follow up appointment scheduled with their surgeon? Yes   Has the patient kept scheduled appointments due by today? Yes   Comments Was seen in the ED since discharge for a hematoma.    Has home health visited the patient within 72 hours of discharge? N/A   Psychosocial issues? No   Did the patient receive a copy of their discharge instructions? Yes   What is the patient's perception of their health status since discharge? Improving   Nursing interventions Nurse provided patient education   Is the patient /caregiver able to teach back basic post-op care? No tub bath, swimming, or hot tub until instructed by MD, Take showers only when approved by MD-sponge bathe until then, Keep incision areas clean,dry and protected   Is the patient/caregiver able to teach back signs and symptoms of incisional infection? Fever, Pus or odor from incision, Incisional warmth, Increased drainage or bleeding, Increased redness, swelling or pain at the incisonal site   Is the patient/caregiver able to teach back steps to recovery at home? Set small, achievable goals for return to baseline health, Rest and rebuild strength, gradually increase activity, Make a list of  questions for surgeon's appointment   If the patient is a current smoker, are they able to teach back resources for cessation? Not a smoker   Is the patient/caregiver able to teach back the hierarchy of who to call/visit for symptoms/problems? PCP, Specialist, Home health nurse, Urgent Care, ED, 911 Yes   Week 2 call completed? Yes   Wrap up additional comments Doing well, no questions.          GULSHAN MCFARLAND - Registered Nurse

## 2022-09-29 ENCOUNTER — APPOINTMENT (OUTPATIENT)
Dept: VASCULAR SURGERY | Facility: HOSPITAL | Age: 57
End: 2022-09-29

## 2022-09-29 PROCEDURE — G0463 HOSPITAL OUTPT CLINIC VISIT: HCPCS

## 2022-09-30 ENCOUNTER — TRANSCRIBE ORDERS (OUTPATIENT)
Dept: ADMINISTRATIVE | Facility: HOSPITAL | Age: 57
End: 2022-09-30

## 2022-09-30 DIAGNOSIS — I71.40 ABDOMINAL AORTIC ANEURYSM WITHOUT RUPTURE: Primary | ICD-10-CM

## 2022-09-30 DIAGNOSIS — I73.9 PERIPHERAL VASCULAR DISEASE, UNSPECIFIED: ICD-10-CM

## 2022-10-16 ENCOUNTER — HOSPITAL ENCOUNTER (EMERGENCY)
Facility: HOSPITAL | Age: 57
Discharge: HOME | End: 2022-10-16
Attending: EMERGENCY MEDICINE | Admitting: EMERGENCY MEDICINE

## 2022-10-16 VITALS
HEIGHT: 63 IN | TEMPERATURE: 97.6 F | BODY MASS INDEX: 31.36 KG/M2 | HEART RATE: 71 BPM | WEIGHT: 177 LBS | SYSTOLIC BLOOD PRESSURE: 172 MMHG | OXYGEN SATURATION: 99 % | RESPIRATION RATE: 18 BRPM | DIASTOLIC BLOOD PRESSURE: 85 MMHG

## 2022-10-16 DIAGNOSIS — Z48.89 ENCOUNTER FOR POST SURGICAL WOUND CHECK: Primary | ICD-10-CM

## 2022-10-16 PROCEDURE — 99282 EMERGENCY DEPT VISIT SF MDM: CPT

## 2022-10-16 PROCEDURE — 99283 EMERGENCY DEPT VISIT LOW MDM: CPT | Performed by: EMERGENCY MEDICINE

## 2022-10-17 NOTE — ED PROVIDER NOTES
Subjective   History of Present Illness  Complaining of her right inguinal surgical wound that has dehisced with connective white tissue underneath.  She was concerned that it was infected however there is no pus being drained from it there is no surrounding erythema.  Her doctor is already seen this by picture on Friday and a antibiotic was called in for her but it was felt that this was scar tissue per that physician.  I concur thoughts exactly what it looks like.  Patient's not had any fevers or chills.        Review of Systems   All other systems reviewed and are negative.      Past Medical History:   Diagnosis Date   • CAD (coronary artery disease)    • Diabetes ([facility])    • DVT (deep venous thrombosis) ([facility])    • Hypertension        Allergies   Allergen Reactions   • Aspirin GI Intolerance and Other (See Comments)     Nausea and vomiting      • Hydrocodone Itching       Past Surgical History:   Procedure Laterality Date   • ANGIOPLASTY  09/08/2022    failed peripheral angioplasty by Dr Kurtz   • ANGIOPLASTY ILIAC ARTERY Bilateral 9/9/2022    Procedure: BILATERAL COMMON ILIAC ARTERY ANGIOPLASTY AND STENT PLACEMENT, RIGHT EXTERNAL ILIAC ARTERY ANGIOPLASTY AND STENT, RIGHT EXTERNAL ILIAC ARTERY DRUG COATED BALLOON ANGIOPLASTY AND RIGHT LOWER EXTREMITY ANGIOGRAM;  Surgeon: Shirley Kurtz MD;  [facility];  Service: Vascular;  Laterality: Bilateral;   • CARDIAC CATHETERIZATION N/A 9/8/2022    Procedure: Thrombolysis-peripheral;  Surgeon: Shirley Kurtz MD;  [facility];  Service: Cardiovascular;  Laterality: N/A;   • ILIAC ARTERY STENT Bilateral     At [facility]   • LEFT HEART CATH         Family History   Problem Relation Age of Onset   • No Known Problems Mother    • No Known Problems Father        Social History     Socioeconomic History   • Marital status:    Tobacco Use   • Smoking status: Never   • Smokeless tobacco: Never   Vaping Use   •  Vaping Use: Never used   Substance and Sexual Activity   • Alcohol use: Never   • Drug use: Never   • Sexual activity: Defer           Objective   Physical Exam  Vitals and nursing note reviewed.   Constitutional:       Appearance: Normal appearance.   HENT:      Head: Normocephalic and atraumatic.   Skin:     General: Skin is warm and dry.      Coloration: Skin is not pale.      Findings: No erythema or lesion.      Comments: Patient's right inguinal wound has one quarter of the length of the wound that is opened with scar tissue Vivi's material that is probably connective tissue granulating in.  There is no foul-smelling or drainage noted at all there is no surrounding erythema this wound looks like it is granulating in.  I want her to follow the instructions per the surgeon.   Neurological:      Mental Status: She is alert.         Procedures           ED Course                                           MDM  Number of Diagnoses or Management Options  Diagnosis management comments: Patient has a wound that seems to be granulating in there is no drainage noted at all there is no surrounding erythema patient will continue to follow the instructions per her surgeon.      Final diagnoses:   Encounter for post surgical wound check       ED Disposition  ED Disposition     ED Disposition   Discharge    Condition   Stable    Comment   --             Collin Low, APRN  [address] [facility] [place] [address]  [phone]      Follow-up with the surgeon follow the instructions per surgeon         Medication List      No changes were made to your prescriptions during this visit.          Rodolfo Bowden MD  10/16/22 [id number]

## 2022-10-25 ENCOUNTER — TRANSCRIBE ORDERS (OUTPATIENT)
Dept: ADMINISTRATIVE | Facility: HOSPITAL | Age: 57
End: 2022-10-25

## 2022-10-25 DIAGNOSIS — I71.40 ABDOMINAL AORTIC ANEURYSM (AAA) WITHOUT RUPTURE, UNSPECIFIED PART: Primary | ICD-10-CM

## 2022-10-27 ENCOUNTER — HOSPITAL ENCOUNTER (OUTPATIENT)
Dept: CARDIOLOGY | Facility: HOSPITAL | Age: 57
Discharge: HOME | End: 2022-10-27

## 2022-10-27 DIAGNOSIS — I71.40 ABDOMINAL AORTIC ANEURYSM WITHOUT RUPTURE: ICD-10-CM

## 2022-10-27 DIAGNOSIS — I73.9 PERIPHERAL VASCULAR DISEASE, UNSPECIFIED: ICD-10-CM

## 2022-10-27 LAB
ABDOMINAL DIST AORTA AP: 1.7 CM
ABDOMINAL DIST AORTA TRANS: 1.6 CM
ABDOMINAL DIST AORTA VEL: 89 CM/S
ABDOMINAL RT EXT ILIAC VEL: 91 CM/S
BH CV BAS DIALYSIS STENT TWO DIST STENT PSV: 128 CM/SEC
BH CV LOWER ARTERIAL LEFT ABI RATIO: 0.93
BH CV LOWER ARTERIAL LEFT GREAT TOE SYS MAX: 67
BH CV LOWER ARTERIAL LEFT POST TIBIAL SYS MAX: 111
BH CV LOWER ARTERIAL LEFT TBI RATIO: 0.56
BH CV LOWER ARTERIAL RIGHT ABI RATIO: 1.13
BH CV LOWER ARTERIAL RIGHT GREAT TOE SYS MAX: 117
BH CV LOWER ARTERIAL RIGHT POST TIBIAL SYS MAX: 135
BH CV LOWER ARTERIAL RIGHT TBI RATIO: 0.98
BH CV VAS DIALYSIS STENT ONE  DIST STENT PSV: 186 CM/SEC
BH CV VAS DIALYSIS STENT ONE MID STENT PSV: 142 CM/SEC
BH CV VAS DIALYSIS STENT ONE POST STENT PSV: 162 CM/SEC
BH CV VAS DIALYSIS STENT ONE PRE STENT PSV: 102 CM/SEC
BH CV VAS DIALYSIS STENT ONE PROX STENT PSV: 108 CM/SEC
BH CV VAS DIALYSIS STENT THREE DIST STENT PSV: 149 CM/SEC
BH CV VAS DIALYSIS STENT THREE MID STENT PSV: 121 CM/SEC
BH CV VAS DIALYSIS STENT THREE POST STENT PSV: 164 CM/SEC
BH CV VAS DIALYSIS STENT THREE PROX STENT PSV: 169 CM/SEC
BH CV VAS DIALYSIS STENT TWO MID STENT PSV: 139 CM/SEC
BH CV VAS DIALYSIS STENT TWO PRE STENT PSV: 102 CM/SEC
BH CV VAS DIALYSIS STENT TWO PROX STENT PSV: 158 CM/SEC
BH CV VAS FREE TEXT STENT ONE: NORMAL
BH CV VAS FREE TEXT STENT THREE: NORMAL
BH CV VAS FREE TEXT STENT TWO: NORMAL
MAXIMAL PREDICTED HEART RATE: 162 BPM
MAXIMAL PREDICTED HEART RATE: 162 BPM
STRESS TARGET HR: 138 BPM
STRESS TARGET HR: 138 BPM
UPPER ARTERIAL LEFT ARM BRACHIAL SYS MAX: 117 MMHG
UPPER ARTERIAL RIGHT ARM BRACHIAL SYS MAX: 119 MMHG

## 2022-10-27 PROCEDURE — 93978 VASCULAR STUDY: CPT

## 2022-10-27 PROCEDURE — 93922 UPR/L XTREMITY ART 2 LEVELS: CPT

## 2022-11-11 ENCOUNTER — OFFICE VISIT (OUTPATIENT)
Dept: WOUND CARE | Facility: HOSPITAL | Age: 57
End: 2022-11-11

## 2022-11-11 PROCEDURE — G0463 HOSPITAL OUTPT CLINIC VISIT: HCPCS

## 2022-11-17 ENCOUNTER — APPOINTMENT (OUTPATIENT)
Dept: VASCULAR SURGERY | Facility: HOSPITAL | Age: 57
End: 2022-11-17

## 2022-11-22 ENCOUNTER — OFFICE VISIT (OUTPATIENT)
Dept: WOUND CARE | Facility: HOSPITAL | Age: 57
End: 2022-11-22

## 2022-11-22 PROCEDURE — G0463 HOSPITAL OUTPT CLINIC VISIT: HCPCS

## 2022-12-20 ENCOUNTER — OFFICE VISIT (OUTPATIENT)
Dept: WOUND CARE | Facility: HOSPITAL | Age: 57
End: 2022-12-20

## 2022-12-20 PROCEDURE — G0463 HOSPITAL OUTPT CLINIC VISIT: HCPCS

## 2023-01-23 ENCOUNTER — TRANSCRIBE ORDERS (OUTPATIENT)
Dept: ADMINISTRATIVE | Facility: HOSPITAL | Age: 58
End: 2023-01-23
Payer: MEDICARE

## 2023-01-23 ENCOUNTER — HOSPITAL ENCOUNTER (OUTPATIENT)
Dept: GENERAL RADIOLOGY | Facility: HOSPITAL | Age: 58
Discharge: HOME | End: 2023-01-23

## 2023-01-23 DIAGNOSIS — Z02.71 ENCOUNTER FOR DISABILITY DETERMINATION: Primary | ICD-10-CM

## 2023-01-23 DIAGNOSIS — Z02.71 ENCOUNTER FOR DISABILITY DETERMINATION: ICD-10-CM

## 2023-01-23 PROCEDURE — 72100 X-RAY EXAM L-S SPINE 2/3 VWS: CPT

## 2023-01-23 PROCEDURE — 72040 X-RAY EXAM NECK SPINE 2-3 VW: CPT

## 2023-01-23 PROCEDURE — 73502 X-RAY EXAM HIP UNI 2-3 VIEWS: CPT

## 2023-04-14 ENCOUNTER — HOSPITAL ENCOUNTER (EMERGENCY)
Facility: HOSPITAL | Age: 58
Discharge: HOME | End: 2023-04-14
Attending: EMERGENCY MEDICINE | Admitting: EMERGENCY MEDICINE
Payer: MEDICARE

## 2023-04-14 ENCOUNTER — APPOINTMENT (OUTPATIENT)
Dept: CT IMAGING | Facility: HOSPITAL | Age: 58
End: 2023-04-14
Payer: MEDICARE

## 2023-04-14 VITALS
HEART RATE: 71 BPM | RESPIRATION RATE: 16 BRPM | BODY MASS INDEX: 27.11 KG/M2 | TEMPERATURE: 97.3 F | WEIGHT: 153 LBS | DIASTOLIC BLOOD PRESSURE: 75 MMHG | SYSTOLIC BLOOD PRESSURE: 150 MMHG | OXYGEN SATURATION: 98 % | HEIGHT: 63 IN

## 2023-04-14 DIAGNOSIS — R07.1 CHEST PAIN ON BREATHING: Primary | ICD-10-CM

## 2023-04-14 LAB
ANION GAP SERPL CALCULATED.3IONS-SCNC: 11.1 MMOL/L (ref 5–15)
BASOPHILS # BLD AUTO: 0.03 10*3/MM3 (ref 0–0.2)
BASOPHILS NFR BLD AUTO: 0.2 % (ref 0–1.5)
BUN SERPL-MCNC: 10 MG/DL (ref 6–20)
BUN/CREAT SERPL: 14.7 (ref 7–25)
CALCIUM SPEC-SCNC: 9.6 MG/DL (ref 8.6–10.5)
CHLORIDE SERPL-SCNC: 100 MMOL/L (ref 98–107)
CO2 SERPL-SCNC: 26.9 MMOL/L (ref 22–29)
CREAT SERPL-MCNC: 0.68 MG/DL (ref 0.57–1)
DEPRECATED RDW RBC AUTO: 39.5 FL (ref 37–54)
EGFRCR SERPLBLD CKD-EPI 2021: 101.1 ML/MIN/1.73
EOSINOPHIL # BLD AUTO: 0.1 10*3/MM3 (ref 0–0.4)
EOSINOPHIL NFR BLD AUTO: 0.8 % (ref 0.3–6.2)
ERYTHROCYTE [DISTWIDTH] IN BLOOD BY AUTOMATED COUNT: 12.2 % (ref 12.3–15.4)
GLUCOSE SERPL-MCNC: 205 MG/DL (ref 65–99)
HCT VFR BLD AUTO: 46.9 % (ref 34–46.6)
HGB BLD-MCNC: 15.2 G/DL (ref 12–15.9)
IMM GRANULOCYTES # BLD AUTO: 0.03 10*3/MM3 (ref 0–0.05)
IMM GRANULOCYTES NFR BLD AUTO: 0.2 % (ref 0–0.5)
LYMPHOCYTES # BLD AUTO: 3.4 10*3/MM3 (ref 0.7–3.1)
LYMPHOCYTES NFR BLD AUTO: 26 % (ref 19.6–45.3)
MCH RBC QN AUTO: 28.2 PG (ref 26.6–33)
MCHC RBC AUTO-ENTMCNC: 32.4 G/DL (ref 31.5–35.7)
MCV RBC AUTO: 87 FL (ref 79–97)
MONOCYTES # BLD AUTO: 0.63 10*3/MM3 (ref 0.1–0.9)
MONOCYTES NFR BLD AUTO: 4.8 % (ref 5–12)
NEUTROPHILS NFR BLD AUTO: 68 % (ref 42.7–76)
NEUTROPHILS NFR BLD AUTO: 8.89 10*3/MM3 (ref 1.7–7)
PLATELET # BLD AUTO: 331 10*3/MM3 (ref 140–450)
PMV BLD AUTO: 10.4 FL (ref 6–12)
POTASSIUM SERPL-SCNC: 3.7 MMOL/L (ref 3.5–5.2)
QT INTERVAL: 382 MS
RBC # BLD AUTO: 5.39 10*6/MM3 (ref 3.77–5.28)
SODIUM SERPL-SCNC: 138 MMOL/L (ref 136–145)
TROPONIN T SERPL HS-MCNC: 12 NG/L
TROPONIN T SERPL HS-MCNC: 12 NG/L
WBC NRBC COR # BLD: 13.08 10*3/MM3 (ref 3.4–10.8)

## 2023-04-14 PROCEDURE — 93005 ELECTROCARDIOGRAM TRACING: CPT | Performed by: EMERGENCY MEDICINE

## 2023-04-14 PROCEDURE — 85025 COMPLETE CBC W/AUTO DIFF WBC: CPT | Performed by: EMERGENCY MEDICINE

## 2023-04-14 PROCEDURE — 96374 THER/PROPH/DIAG INJ IV PUSH: CPT

## 2023-04-14 PROCEDURE — 84484 ASSAY OF TROPONIN QUANT: CPT | Performed by: EMERGENCY MEDICINE

## 2023-04-14 PROCEDURE — 80048 BASIC METABOLIC PNL TOTAL CA: CPT | Performed by: EMERGENCY MEDICINE

## 2023-04-14 PROCEDURE — 99283 EMERGENCY DEPT VISIT LOW MDM: CPT

## 2023-04-14 PROCEDURE — 36415 COLL VENOUS BLD VENIPUNCTURE: CPT

## 2023-04-14 PROCEDURE — 71275 CT ANGIOGRAPHY CHEST: CPT

## 2023-04-14 PROCEDURE — 25010000002 ONDANSETRON PER 1 MG: Performed by: EMERGENCY MEDICINE

## 2023-04-14 PROCEDURE — 96361 HYDRATE IV INFUSION ADD-ON: CPT

## 2023-04-14 PROCEDURE — 25510000001 IOPAMIDOL PER 1 ML: Performed by: EMERGENCY MEDICINE

## 2023-04-14 RX ORDER — ONDANSETRON 2 MG/ML
4 INJECTION INTRAMUSCULAR; INTRAVENOUS ONCE
Status: COMPLETED | OUTPATIENT
Start: 2023-04-14 | End: 2023-04-14

## 2023-04-14 RX ORDER — SODIUM CHLORIDE 9 MG/ML
125 INJECTION, SOLUTION INTRAVENOUS CONTINUOUS
Status: DISCONTINUED | OUTPATIENT
Start: 2023-04-14 | End: 2023-04-14 | Stop reason: HOSPADM

## 2023-04-14 RX ADMIN — SODIUM CHLORIDE 125 ML/HR: 9 INJECTION, SOLUTION INTRAVENOUS at 13:43

## 2023-04-14 RX ADMIN — IOPAMIDOL 100 ML: 755 INJECTION, SOLUTION INTRAVENOUS at 14:11

## 2023-04-14 RX ADMIN — ONDANSETRON 4 MG: 2 INJECTION INTRAMUSCULAR; INTRAVENOUS at 13:43

## 2023-04-14 NOTE — FSED PROVIDER NOTE
Subjective   History of Present Illness  Patient with a history of a pulmonary embolus diagnosed in September 2022 was started on Eliquis and Plavix, she had some difficulties at the pharmacy after not entirely clear to me but essentially she has been off her Eliquis for several months now, she did restart her Plavix a few weeks ago.  She comes in today with 4 to 5 days of chest pain, no shortness of breath no nausea vomiting or diarrhea, she did have 1 episode of vomiting but that has resolved.  She is seeing her cardiologist next week        Review of Systems   Constitutional: Negative for chills and diaphoresis.   HENT: Negative for congestion, drooling and ear discharge.    Eyes: Negative for pain, discharge and itching.   Respiratory: Negative for apnea, choking and chest tightness.    Cardiovascular: Negative for chest pain and leg swelling.   Gastrointestinal: Negative for abdominal distention, abdominal pain and anal bleeding.   Endocrine: Negative for cold intolerance, heat intolerance and polydipsia.   Genitourinary: Negative for difficulty urinating, dyspareunia and dysuria.   Musculoskeletal: Negative for arthralgias, back pain and gait problem.   Skin: Negative for color change, pallor and rash.   Allergic/Immunologic: Negative for environmental allergies and food allergies.   Neurological: Negative for dizziness, facial asymmetry and headaches.   Hematological: Negative for adenopathy. Does not bruise/bleed easily.   Psychiatric/Behavioral: Negative for agitation, behavioral problems, confusion and decreased concentration.   All other systems reviewed and are negative.      Past Medical History:   Diagnosis Date   • CAD (coronary artery disease)    • Diabetes    • DVT (deep venous thrombosis)    • Hypertension        Allergies   Allergen Reactions   • Aspirin GI Intolerance and Other (See Comments)     Nausea and vomiting      • Hydrocodone Itching       Past Surgical History:   Procedure Laterality  Date   • ANGIOPLASTY  09/08/2022    failed peripheral angioplasty by Dr Kurtz   • ANGIOPLASTY ILIAC ARTERY Bilateral 9/9/2022    Procedure: BILATERAL COMMON ILIAC ARTERY ANGIOPLASTY AND STENT PLACEMENT, RIGHT EXTERNAL ILIAC ARTERY ANGIOPLASTY AND STENT, RIGHT EXTERNAL ILIAC ARTERY DRUG COATED BALLOON ANGIOPLASTY AND RIGHT LOWER EXTREMITY ANGIOGRAM;  Surgeon: Shirley Kurtz MD;  Location: [facility] MAIN OR;  Service: Vascular;  Laterality: Bilateral;   • CARDIAC CATHETERIZATION N/A 9/8/2022    Procedure: Thrombolysis-peripheral;  Surgeon: Shirley Kurtz MD;  Location: [facility] CATH INVASIVE LOCATION;  Service: Cardiovascular;  Laterality: N/A;   • ILIAC ARTERY STENT Bilateral     At [facility]   • LEFT HEART CATH         Family History   Problem Relation Age of Onset   • No Known Problems Mother    • No Known Problems Father        Social History     Socioeconomic History   • Marital status:    Tobacco Use   • Smoking status: Never   • Smokeless tobacco: Never   Vaping Use   • Vaping Use: Never used   Substance and Sexual Activity   • Alcohol use: Never   • Drug use: Never   • Sexual activity: Defer           Objective   Physical Exam  Vitals and nursing note reviewed.   Constitutional:       General: She is not in acute distress.     Appearance: Normal appearance.   HENT:      Head: Normocephalic and atraumatic.      Nose: Nose normal.      Mouth/Throat:      Mouth: Mucous membranes are moist.   Eyes:      Extraocular Movements: Extraocular movements intact.      Pupils: Pupils are equal, round, and reactive to light.   Cardiovascular:      Rate and Rhythm: Normal rate and regular rhythm.   Pulmonary:      Effort: Pulmonary effort is normal.      Breath sounds: Normal breath sounds.   Abdominal:      General: Abdomen is flat.      Palpations: Abdomen is soft.   Musculoskeletal:      Cervical back: Normal range of motion and neck supple.      Right lower leg: No tenderness. No edema.      Left  lower leg: No tenderness. No edema.   Skin:     General: Skin is warm and dry.   Neurological:      General: No focal deficit present.      Mental Status: She is alert and oriented to person, place, and time.   Psychiatric:         Mood and Affect: Mood normal.         Behavior: Behavior normal.         ECG 12 Lead      Date/Time: 4/14/2023 1:26 PM  Performed by: Malcom Acevedo MD  Authorized by: Malcom Acevedo MD   Interpreted by physician  Comparison: not compared with previous ECG   Rhythm: sinus rhythm  Rate: normal  QRS axis: normal  ST Segments: ST segments normal  Clinical impression: normal ECG                 ED Course                      HEART Score: 3                      Medical Decision Making  Patient with complicated past medical history has several days of constant chest pain, has a history of a PE, is off her anticoagulation except for Plavix, will check basic labs including troponin to exclude coronary artery disease, will also do CT angiogram to evaluate for new or worsening PE, plan would be to restart her Eliquis and let her follow-up with her cardiologist next week    CT chest did not show any signs of clot, due to her not completing her full course of treatment will give her enough to get to her cardiologist in 6 days of Eliquis and they can decide what she needs to do.  Let patient know about high-sensitivity troponin and that it was unchanged therefore within normal EKG do not suspect acute coronary syndrome.  She will be following up with her cardiologist this week    Amount and/or Complexity of Data Reviewed  External Data Reviewed: labs, radiology and ECG.  Labs: ordered.  Radiology: ordered.  ECG/medicine tests: ordered and independent interpretation performed.      Risk  Prescription drug management.          Final diagnoses:   Chest pain on breathing       ED Disposition  ED Disposition     ED Disposition   Discharge    Condition   Stable    Comment   --             Collin Low,  APRN  [address] [facility] [place] [address]  [phone]    Schedule an appointment as soon as possible for a visit           Medication List      Changed    * apixaban 2.5 MG tablet tablet  Commonly known as: ELIQUIS  Take 1 tablet by mouth Every 12 (Twelve) Hours. Indications: Post Surgical - Other  What changed: Another medication with the same name was added. Make sure you understand how and when to take each.     * apixaban 2.5 MG tablet tablet  Commonly known as: ELIQUIS  Take 1 tablet by mouth 2 (Two) Times a Day.  What changed: You were already taking a medication with the same name, and this prescription was added. Make sure you understand how and when to take each.         * This list has 2 medication(s) that are the same as other medications prescribed for you. Read the directions carefully, and ask your doctor or other care provider to review them with you.               Where to Get Your Medications      These medications were sent to [facility] [id number] [place] [address] [address] - [phone] [facility] [phone] [place] [address] [place] [address]    Phone: [phone]   · apixaban 2.5 MG tablet tablet

## 2023-05-12 ENCOUNTER — APPOINTMENT (OUTPATIENT)
Dept: CT IMAGING | Facility: HOSPITAL | Age: 58
End: 2023-05-12
Payer: MEDICARE

## 2023-05-12 ENCOUNTER — HOSPITAL ENCOUNTER (OUTPATIENT)
Facility: HOSPITAL | Age: 58
Setting detail: OBSERVATION
Discharge: HOME | End: 2023-05-14
Attending: EMERGENCY MEDICINE | Admitting: INTERNAL MEDICINE
Payer: MEDICARE

## 2023-05-12 DIAGNOSIS — R41.82 ALTERED MENTAL STATUS, UNSPECIFIED ALTERED MENTAL STATUS TYPE: Primary | ICD-10-CM

## 2023-05-12 PROBLEM — Z86.718 HISTORY OF DVT (DEEP VEIN THROMBOSIS): Status: ACTIVE | Noted: 2023-05-12

## 2023-05-12 PROBLEM — R44.3 HALLUCINATIONS: Status: ACTIVE | Noted: 2023-05-12

## 2023-05-12 LAB
ALBUMIN SERPL-MCNC: 4.7 G/DL (ref 3.5–5.2)
ALBUMIN/GLOB SERPL: 1.5 G/DL
ALP SERPL-CCNC: 128 U/L (ref 39–117)
ALT SERPL W P-5'-P-CCNC: 28 U/L (ref 1–33)
AMPHET+METHAMPHET UR QL: POSITIVE
ANION GAP SERPL CALCULATED.3IONS-SCNC: 16 MMOL/L (ref 5–15)
APAP SERPL-MCNC: <5 MCG/ML (ref 0–30)
APTT PPP: 25.6 SECONDS (ref 61–76.5)
AST SERPL-CCNC: 23 U/L (ref 1–32)
BACTERIA UR QL AUTO: ABNORMAL /HPF
BARBITURATES UR QL SCN: NEGATIVE
BASOPHILS # BLD AUTO: 0.1 10*3/MM3 (ref 0–0.2)
BASOPHILS NFR BLD AUTO: 0.9 % (ref 0–1.5)
BENZODIAZ UR QL SCN: NEGATIVE
BILIRUB SERPL-MCNC: 0.6 MG/DL (ref 0–1.2)
BILIRUB UR QL STRIP: NEGATIVE
BUN SERPL-MCNC: 10 MG/DL (ref 6–20)
BUN/CREAT SERPL: 13 (ref 7–25)
CALCIUM SPEC-SCNC: 9.9 MG/DL (ref 8.6–10.5)
CANNABINOIDS SERPL QL: NEGATIVE
CHLORIDE SERPL-SCNC: 100 MMOL/L (ref 98–107)
CLARITY UR: CLEAR
CO2 SERPL-SCNC: 23 MMOL/L (ref 22–29)
COCAINE UR QL: NEGATIVE
COD CRY URNS QL: ABNORMAL /HPF
COLOR UR: ABNORMAL
CREAT SERPL-MCNC: 0.77 MG/DL (ref 0.57–1)
DEPRECATED RDW RBC AUTO: 42.9 FL (ref 37–54)
EGFRCR SERPLBLD CKD-EPI 2021: 89.5 ML/MIN/1.73
EOSINOPHIL # BLD AUTO: 0.2 10*3/MM3 (ref 0–0.4)
EOSINOPHIL NFR BLD AUTO: 1.6 % (ref 0.3–6.2)
ERYTHROCYTE [DISTWIDTH] IN BLOOD BY AUTOMATED COUNT: 13.5 % (ref 12.3–15.4)
ETHANOL UR QL: <0.01 %
GEN 5 2HR TROPONIN T REFLEX: 8 NG/L
GLOBULIN UR ELPH-MCNC: 3.1 GM/DL
GLUCOSE BLDC GLUCOMTR-MCNC: 213 MG/DL (ref 70–105)
GLUCOSE SERPL-MCNC: 245 MG/DL (ref 65–99)
GLUCOSE UR STRIP-MCNC: ABNORMAL MG/DL
HCT VFR BLD AUTO: 48.5 % (ref 34–46.6)
HGB BLD-MCNC: 15.9 G/DL (ref 12–15.9)
HGB UR QL STRIP.AUTO: NEGATIVE
HYALINE CASTS UR QL AUTO: ABNORMAL /LPF
INR PPP: <0.93 (ref 0.93–1.1)
KETONES UR QL STRIP: ABNORMAL
LEUKOCYTE ESTERASE UR QL STRIP.AUTO: NEGATIVE
LYMPHOCYTES # BLD AUTO: 3.4 10*3/MM3 (ref 0.7–3.1)
LYMPHOCYTES NFR BLD AUTO: 27.9 % (ref 19.6–45.3)
MCH RBC QN AUTO: 28.2 PG (ref 26.6–33)
MCHC RBC AUTO-ENTMCNC: 32.8 G/DL (ref 31.5–35.7)
MCV RBC AUTO: 86.1 FL (ref 79–97)
METHADONE UR QL SCN: NEGATIVE
MONOCYTES # BLD AUTO: 0.8 10*3/MM3 (ref 0.1–0.9)
MONOCYTES NFR BLD AUTO: 6.4 % (ref 5–12)
MUCOUS THREADS URNS QL MICRO: ABNORMAL /HPF
NEUTROPHILS NFR BLD AUTO: 63.2 % (ref 42.7–76)
NEUTROPHILS NFR BLD AUTO: 7.6 10*3/MM3 (ref 1.7–7)
NITRITE UR QL STRIP: NEGATIVE
NRBC BLD AUTO-RTO: 0.2 /100 WBC (ref 0–0.2)
OPIATES UR QL: NEGATIVE
OXYCODONE UR QL SCN: NEGATIVE
PH UR STRIP.AUTO: 6 [PH] (ref 5–8)
PLATELET # BLD AUTO: 341 10*3/MM3 (ref 140–450)
PMV BLD AUTO: 8.5 FL (ref 6–12)
POTASSIUM SERPL-SCNC: 4.2 MMOL/L (ref 3.5–5.2)
PROT SERPL-MCNC: 7.8 G/DL (ref 6–8.5)
PROT UR QL STRIP: ABNORMAL
PROTHROMBIN TIME: 9.9 SECONDS (ref 9.6–11.7)
RBC # BLD AUTO: 5.64 10*6/MM3 (ref 3.77–5.28)
RBC # UR STRIP: ABNORMAL /HPF
REF LAB TEST METHOD: ABNORMAL
SALICYLATES SERPL-MCNC: <0.3 MG/DL
SODIUM SERPL-SCNC: 139 MMOL/L (ref 136–145)
SP GR UR STRIP: 1.03 (ref 1–1.03)
SQUAMOUS #/AREA URNS HPF: ABNORMAL /HPF
TROPONIN T DELTA: -1 NG/L
TROPONIN T SERPL HS-MCNC: 9 NG/L
UROBILINOGEN UR QL STRIP: ABNORMAL
WBC # UR STRIP: ABNORMAL /HPF
WBC NRBC COR # BLD: 12 10*3/MM3 (ref 3.4–10.8)

## 2023-05-12 PROCEDURE — 72125 CT NECK SPINE W/O DYE: CPT

## 2023-05-12 PROCEDURE — 80307 DRUG TEST PRSMV CHEM ANLYZR: CPT | Performed by: EMERGENCY MEDICINE

## 2023-05-12 PROCEDURE — 82948 REAGENT STRIP/BLOOD GLUCOSE: CPT

## 2023-05-12 PROCEDURE — 82077 ASSAY SPEC XCP UR&BREATH IA: CPT | Performed by: EMERGENCY MEDICINE

## 2023-05-12 PROCEDURE — G0378 HOSPITAL OBSERVATION PER HR: HCPCS

## 2023-05-12 PROCEDURE — 81001 URINALYSIS AUTO W/SCOPE: CPT | Performed by: EMERGENCY MEDICINE

## 2023-05-12 PROCEDURE — 85025 COMPLETE CBC W/AUTO DIFF WBC: CPT | Performed by: EMERGENCY MEDICINE

## 2023-05-12 PROCEDURE — P9612 CATHETERIZE FOR URINE SPEC: HCPCS

## 2023-05-12 PROCEDURE — 36415 COLL VENOUS BLD VENIPUNCTURE: CPT | Performed by: EMERGENCY MEDICINE

## 2023-05-12 PROCEDURE — 93005 ELECTROCARDIOGRAM TRACING: CPT | Performed by: EMERGENCY MEDICINE

## 2023-05-12 PROCEDURE — 85610 PROTHROMBIN TIME: CPT | Performed by: EMERGENCY MEDICINE

## 2023-05-12 PROCEDURE — 99285 EMERGENCY DEPT VISIT HI MDM: CPT

## 2023-05-12 PROCEDURE — 85730 THROMBOPLASTIN TIME PARTIAL: CPT | Performed by: EMERGENCY MEDICINE

## 2023-05-12 PROCEDURE — 84443 ASSAY THYROID STIM HORMONE: CPT | Performed by: PHYSICIAN ASSISTANT

## 2023-05-12 PROCEDURE — 70450 CT HEAD/BRAIN W/O DYE: CPT

## 2023-05-12 PROCEDURE — 80179 DRUG ASSAY SALICYLATE: CPT | Performed by: EMERGENCY MEDICINE

## 2023-05-12 PROCEDURE — 83036 HEMOGLOBIN GLYCOSYLATED A1C: CPT | Performed by: PHYSICIAN ASSISTANT

## 2023-05-12 PROCEDURE — 96374 THER/PROPH/DIAG INJ IV PUSH: CPT

## 2023-05-12 PROCEDURE — 25010000002 ONDANSETRON PER 1 MG

## 2023-05-12 PROCEDURE — 84484 ASSAY OF TROPONIN QUANT: CPT | Performed by: EMERGENCY MEDICINE

## 2023-05-12 PROCEDURE — 82550 ASSAY OF CK (CPK): CPT | Performed by: PHYSICIAN ASSISTANT

## 2023-05-12 PROCEDURE — 80143 DRUG ASSAY ACETAMINOPHEN: CPT | Performed by: EMERGENCY MEDICINE

## 2023-05-12 PROCEDURE — 80053 COMPREHEN METABOLIC PANEL: CPT | Performed by: EMERGENCY MEDICINE

## 2023-05-12 PROCEDURE — 84145 PROCALCITONIN (PCT): CPT | Performed by: PHYSICIAN ASSISTANT

## 2023-05-12 RX ORDER — SODIUM CHLORIDE 0.9 % (FLUSH) 0.9 %
10 SYRINGE (ML) INJECTION AS NEEDED
Status: DISCONTINUED | OUTPATIENT
Start: 2023-05-12 | End: 2023-05-14 | Stop reason: HOSPADM

## 2023-05-12 RX ORDER — ONDANSETRON 2 MG/ML
4 INJECTION INTRAMUSCULAR; INTRAVENOUS ONCE
Status: COMPLETED | OUTPATIENT
Start: 2023-05-12 | End: 2023-05-12

## 2023-05-12 RX ORDER — ONDANSETRON 2 MG/ML
INJECTION INTRAMUSCULAR; INTRAVENOUS
Status: COMPLETED
Start: 2023-05-12 | End: 2023-05-12

## 2023-05-12 RX ORDER — SODIUM CHLORIDE, SODIUM LACTATE, POTASSIUM CHLORIDE, CALCIUM CHLORIDE 600; 310; 30; 20 MG/100ML; MG/100ML; MG/100ML; MG/100ML
75 INJECTION, SOLUTION INTRAVENOUS CONTINUOUS
Status: DISCONTINUED | OUTPATIENT
Start: 2023-05-12 | End: 2023-05-13

## 2023-05-12 RX ADMIN — SODIUM CHLORIDE, POTASSIUM CHLORIDE, SODIUM LACTATE AND CALCIUM CHLORIDE 500 ML: 600; 310; 30; 20 INJECTION, SOLUTION INTRAVENOUS at 20:53

## 2023-05-12 RX ADMIN — ONDANSETRON 4 MG: 2 INJECTION INTRAMUSCULAR; INTRAVENOUS at 18:04

## 2023-05-12 NOTE — ED PROVIDER NOTES
"Subjective   History of Present Illness  Chief complaint: Patient is a 57-year-old female.  She is on Eliquis.  She is altered acutely.  Started yesterday per EMS report.  I am she was having some hallucinations.  Some confusion.  However this became significantly worse in the last couple of hours prior to arrival.  She manages her medicine at home.  There has been multiple falls recently over the last few days.  No known head injury.  Patient was not providing much history.  She states \"you give me some water and I will give you some answers\".    Context:    Duration:    Timing:    Severity:    Associated Symptoms:        PCP:  LMP:        Review of Systems   Unable to perform ROS: Mental status change       Past Medical History:   Diagnosis Date   • CAD (coronary artery disease)    • Diabetes    • DVT (deep venous thrombosis)    • Hypertension        Allergies   Allergen Reactions   • Aspirin GI Intolerance and Other (See Comments)     Nausea and vomiting      • Hydrocodone Itching       Past Surgical History:   Procedure Laterality Date   • ANGIOPLASTY  09/08/2022    failed peripheral angioplasty by Dr Kurtz   • ANGIOPLASTY ILIAC ARTERY Bilateral 9/9/2022    Procedure: BILATERAL COMMON ILIAC ARTERY ANGIOPLASTY AND STENT PLACEMENT, RIGHT EXTERNAL ILIAC ARTERY ANGIOPLASTY AND STENT, RIGHT EXTERNAL ILIAC ARTERY DRUG COATED BALLOON ANGIOPLASTY AND RIGHT LOWER EXTREMITY ANGIOGRAM;  Surgeon: Shirley Kurtz MD;  Location: [facility] MAIN OR;  Service: Vascular;  Laterality: Bilateral;   • CARDIAC CATHETERIZATION N/A 9/8/2022    Procedure: Thrombolysis-peripheral;  Surgeon: Shirley Kurtz MD;  Location: [facility] CATH INVASIVE LOCATION;  Service: Cardiovascular;  Laterality: N/A;   • ILIAC ARTERY STENT Bilateral     At [facility]   • LEFT HEART CATH         Family History   Problem Relation Age of Onset   • No Known Problems Mother    • No Known Problems Father        Social History     Socioeconomic History "   • Marital status:    Tobacco Use   • Smoking status: Never   • Smokeless tobacco: Never   Vaping Use   • Vaping Use: Never used   Substance and Sexual Activity   • Alcohol use: Never   • Drug use: Never   • Sexual activity: Defer           Objective   Physical Exam  Vitals and nursing note reviewed.   Constitutional:       Appearance: She is well-developed.   HENT:      Head: Normocephalic and atraumatic.   Cardiovascular:      Rate and Rhythm: Normal rate.   Pulmonary:      Breath sounds: Normal breath sounds.   Abdominal:      Palpations: Abdomen is soft.   Musculoskeletal:      Cervical back: Normal range of motion.   Skin:     General: Skin is warm and dry.   Neurological:      Mental Status: She is alert. She is confused.      Cranial Nerves: No cranial nerve deficit.      Comments: Patient will not perform an exam cooperatively.  She however does move all extremities   Psychiatric:         Behavior: Behavior is agitated.         Procedures           ED Course  ED Course as of 05/12/23 1943   Fri May 12, 2023   1827 Family states for the last 24 hours there is been progressive confusion.  She was texting them bizarre statements of all day at work.  If she just escalated and become severe in the last couple of hours.  There has been no prior history of mental status changes like this.  She did recently have to switch pharmacies secondary to a buyout.  She is on Eliquis for peripheral vascular disease.  She has legs to give out time to time from this and has had a few falls over the last week. [LH]      ED Course User Index  [LH] Binh Cunningham DO                 Results for orders placed or performed during the hospital encounter of 05/12/23   Comprehensive Metabolic Panel    Specimen: Blood   Result Value Ref Range    Glucose 245 (H) 65 - 99 mg/dL    BUN 10 6 - 20 mg/dL    Creatinine 0.77 0.57 - 1.00 mg/dL    Sodium 139 136 - 145 mmol/L    Potassium 4.2 3.5 - 5.2 mmol/L    Chloride 100 98 -  107 mmol/L    CO2 23.0 22.0 - 29.0 mmol/L    Calcium 9.9 8.6 - 10.5 mg/dL    Total Protein 7.8 6.0 - 8.5 g/dL    Albumin 4.7 3.5 - 5.2 g/dL    ALT (SGPT) 28 1 - 33 U/L    AST (SGOT) 23 1 - 32 U/L    Alkaline Phosphatase 128 (H) 39 - 117 U/L    Total Bilirubin 0.6 0.0 - 1.2 mg/dL    Globulin 3.1 gm/dL    A/G Ratio 1.5 g/dL    BUN/Creatinine Ratio 13.0 7.0 - 25.0    Anion Gap 16.0 (H) 5.0 - 15.0 mmol/L    eGFR 89.5 >60.0 mL/min/1.73   Protime-INR    Specimen: Blood   Result Value Ref Range    Protime 9.9 9.6 - 11.7 Seconds    INR <0.93 (L) 0.93 - 1.10   aPTT    Specimen: Blood   Result Value Ref Range    PTT 25.6 (L) 61.0 - 76.5 seconds   High Sensitivity Troponin T    Specimen: Blood   Result Value Ref Range    HS Troponin T 9 <10 ng/L   CBC Auto Differential    Specimen: Blood   Result Value Ref Range    WBC 12.00 (H) 3.40 - 10.80 10*3/mm3    RBC 5.64 (H) 3.77 - 5.28 10*6/mm3    Hemoglobin 15.9 12.0 - 15.9 g/dL    Hematocrit 48.5 (H) 34.0 - 46.6 %    MCV 86.1 79.0 - 97.0 fL    MCH 28.2 26.6 - 33.0 pg    MCHC 32.8 31.5 - 35.7 g/dL    RDW 13.5 12.3 - 15.4 %    RDW-SD 42.9 37.0 - 54.0 fl    MPV 8.5 6.0 - 12.0 fL    Platelets 341 140 - 450 10*3/mm3    Neutrophil % 63.2 42.7 - 76.0 %    Lymphocyte % 27.9 19.6 - 45.3 %    Monocyte % 6.4 5.0 - 12.0 %    Eosinophil % 1.6 0.3 - 6.2 %    Basophil % 0.9 0.0 - 1.5 %    Neutrophils, Absolute 7.60 (H) 1.70 - 7.00 10*3/mm3    Lymphocytes, Absolute 3.40 (H) 0.70 - 3.10 10*3/mm3    Monocytes, Absolute 0.80 0.10 - 0.90 10*3/mm3    Eosinophils, Absolute 0.20 0.00 - 0.40 10*3/mm3    Basophils, Absolute 0.10 0.00 - 0.20 10*3/mm3    nRBC 0.2 0.0 - 0.2 /100 WBC   Urinalysis With Culture If Indicated - Straight Cath    Specimen: Straight Cath; Urine   Result Value Ref Range    Color, UA Dark Yellow (A) Yellow, Straw    Appearance, UA Clear Clear    pH, UA 6.0 5.0 - 8.0    Specific Gravity, UA 1.030 1.005 - 1.030    Glucose,  mg/dL (1+) (A) Negative    Ketones, UA 15 mg/dL (1+) (A)  Negative    Bilirubin, UA Negative Negative    Blood, UA Negative Negative    Protein,  mg/dL (2+) (A) Negative    Leuk Esterase, UA Negative Negative    Nitrite, UA Negative Negative    Urobilinogen, UA 1.0 E.U./dL 0.2 - 1.0 E.U./dL   Acetaminophen Level    Specimen: Blood   Result Value Ref Range    Acetaminophen <5.0 0.0 - 30.0 mcg/mL   Ethanol    Specimen: Blood   Result Value Ref Range    Ethanol % <0.010 %   Urine Drug Screen - Urine, Clean Catch    Specimen: Urine, Clean Catch   Result Value Ref Range    Amphet/Methamphet, Screen Positive (A) Negative    Barbiturates Screen, Urine Negative Negative    Benzodiazepine Screen, Urine Negative Negative    Cocaine Screen, Urine Negative Negative    Opiate Screen Negative Negative    THC, Screen, Urine Negative Negative    Methadone Screen, Urine Negative Negative    Oxycodone Screen, Urine Negative Negative   Salicylate Level    Specimen: Blood   Result Value Ref Range    Salicylate <0.3 <=30.0 mg/dL   Urinalysis, Microscopic Only - Straight Cath    Specimen: Straight Cath; Urine   Result Value Ref Range    RBC, UA None Seen None Seen /HPF    WBC, UA 0-2 (A) None Seen /HPF    Bacteria, UA None Seen None Seen /HPF    Squamous Epithelial Cells, UA 0-2 None Seen, 0-2 /HPF    Hyaline Casts, UA 3-6 None Seen /LPF    Calcium Oxalate Crystals, UA Small/1+ None Seen /HPF    Mucus, UA Moderate/2+ (A) None Seen, Trace /HPF    Methodology Manual Light Microscopy    POC Glucose Once    Specimen: Blood   Result Value Ref Range    Glucose 213 (H) 70 - 105 mg/dL   ECG 12 Lead Altered Mental Status   Result Value Ref Range    QT Interval 391 ms       CT Head Without Contrast    Result Date: 5/12/2023  Impression: Motion limited examination. 1. No gross acute intracranial abnormality. If high clinical concern, follow-up imaging would be recommended after clinical improvement. Electronically Signed: Howard Cruz  5/12/2023 6:19 PM EDT  Workstation ID: [id number]    CT  Cervical Spine Without Contrast    Result Date: 5/12/2023  Impression: 1. No acute fracture or osseous malalignment. 2. Mild degenerative disc disease of the cervical spine with anterior endplate osteophyte formation at C4-C5, C5-C6, and C6-C7. Electronically Signed: Howard Cruz  5/12/2023 6:29 PM EDT  Workstation ID: [id number]                               Medical Decision Making  Patient was seen for confusion    Differential diagnosis includes but is not limited to intracerebral hemorrhage, acute CVA, toxin, infectious etiologies    Patient's urinalysis does not reveal UTI.  The patient is afebrile here in the emergency department.  She does not have any nuchal rigidity or headache complaints.  Her CT head does not reveal any intracerebral hemorrhage.  No definitive cause for the altered mental status other than a positive tox screen for amphetamine/methamphetamine.  Family members state that she may smoke marijuana occasionally but is never done meth.  They do note that her ex- did methamphetamine and that is why she left him.  But she has not been around that for some time.  Of stroke would still be a possibility.  However less likely.  There are no focal findings.  However she is over 24 hours out of symptoms onset at this point time.  She has no signs of large vessel occlusion    Altered mental status, unspecified altered mental status type: acute illness or injury  Amount and/or Complexity of Data Reviewed  Labs: ordered. Decision-making details documented in ED Course.     Details: Laboratory analysis reviewed as above.  White count 12.  Acetaminophen Tylenol, aspirin and alcohol levels negative.  Radiology: ordered and independent interpretation performed.     Details: Head CT reviewed by myself shows motion limitation but no intracerebral hemorrhage.  No acute fracture of the C-spine.  ECG/medicine tests: ordered and independent interpretation performed.     Details: EKG interpreted by myself  shows sinus rhythm rate of 72.  This was compared with old EKG dated 4/14/2023 showing sinus rhythm rate of 73.      Risk  Prescription drug management.  Decision regarding hospitalization.          Final diagnoses:   None   Altered mental status    ED Disposition  ED Disposition     None          No follow-up provider specified.       Medication List      No changes were made to your prescriptions during this visit.          Binh Cunningham,   05/12/23 1955       Binh Cunningham,   05/12/23 1957       Binh Cunningham,   05/12/23 2021

## 2023-05-12 NOTE — Clinical Note
Level of Care: Med/Surg [1]   Admitting Physician: JAYMIE ROJAS [[id number]]   Attending Physician: JAYMIE ROJAS [[id number]]   Bed Request Comments: tele

## 2023-05-13 ENCOUNTER — APPOINTMENT (OUTPATIENT)
Dept: GENERAL RADIOLOGY | Facility: HOSPITAL | Age: 58
End: 2023-05-13
Payer: MEDICARE

## 2023-05-13 ENCOUNTER — APPOINTMENT (OUTPATIENT)
Dept: MRI IMAGING | Facility: HOSPITAL | Age: 58
End: 2023-05-13
Payer: MEDICARE

## 2023-05-13 LAB
ALBUMIN SERPL-MCNC: 4.4 G/DL (ref 3.5–5.2)
ALBUMIN/GLOB SERPL: 1.7 G/DL
ALP SERPL-CCNC: 116 U/L (ref 39–117)
ALT SERPL W P-5'-P-CCNC: 24 U/L (ref 1–33)
AMPHET+METHAMPHET UR QL: NEGATIVE
ANION GAP SERPL CALCULATED.3IONS-SCNC: 14 MMOL/L (ref 5–15)
AST SERPL-CCNC: 21 U/L (ref 1–32)
B PARAPERT DNA SPEC QL NAA+PROBE: NOT DETECTED
B PERT DNA SPEC QL NAA+PROBE: NOT DETECTED
BARBITURATES UR QL SCN: NEGATIVE
BASOPHILS # BLD AUTO: 0.1 10*3/MM3 (ref 0–0.2)
BASOPHILS # BLD AUTO: 0.1 10*3/MM3 (ref 0–0.2)
BASOPHILS NFR BLD AUTO: 0.5 % (ref 0–1.5)
BASOPHILS NFR BLD AUTO: 0.6 % (ref 0–1.5)
BENZODIAZ UR QL SCN: NEGATIVE
BILIRUB SERPL-MCNC: 0.7 MG/DL (ref 0–1.2)
BUN SERPL-MCNC: 7 MG/DL (ref 6–20)
BUN/CREAT SERPL: 10.6 (ref 7–25)
C PNEUM DNA NPH QL NAA+NON-PROBE: NOT DETECTED
CALCIUM SPEC-SCNC: 9.2 MG/DL (ref 8.6–10.5)
CANNABINOIDS SERPL QL: NEGATIVE
CHLORIDE SERPL-SCNC: 104 MMOL/L (ref 98–107)
CK SERPL-CCNC: 81 U/L (ref 20–180)
CO2 SERPL-SCNC: 21 MMOL/L (ref 22–29)
COCAINE UR QL: NEGATIVE
CREAT SERPL-MCNC: 0.66 MG/DL (ref 0.57–1)
D-LACTATE SERPL-SCNC: 2.3 MMOL/L (ref 0.5–2)
D-LACTATE SERPL-SCNC: 2.5 MMOL/L (ref 0.5–2)
D-LACTATE SERPL-SCNC: 2.6 MMOL/L (ref 0.5–2)
DEPRECATED RDW RBC AUTO: 43.8 FL (ref 37–54)
DEPRECATED RDW RBC AUTO: 45.1 FL (ref 37–54)
EGFRCR SERPLBLD CKD-EPI 2021: 101.8 ML/MIN/1.73
EOSINOPHIL # BLD AUTO: 0.2 10*3/MM3 (ref 0–0.4)
EOSINOPHIL # BLD AUTO: 0.2 10*3/MM3 (ref 0–0.4)
EOSINOPHIL NFR BLD AUTO: 1.4 % (ref 0.3–6.2)
EOSINOPHIL NFR BLD AUTO: 1.9 % (ref 0.3–6.2)
ERYTHROCYTE [DISTWIDTH] IN BLOOD BY AUTOMATED COUNT: 13.8 % (ref 12.3–15.4)
ERYTHROCYTE [DISTWIDTH] IN BLOOD BY AUTOMATED COUNT: 13.8 % (ref 12.3–15.4)
FLUAV SUBTYP SPEC NAA+PROBE: NOT DETECTED
FLUBV RNA ISLT QL NAA+PROBE: NOT DETECTED
GLOBULIN UR ELPH-MCNC: 2.6 GM/DL
GLUCOSE BLDC GLUCOMTR-MCNC: 185 MG/DL (ref 70–105)
GLUCOSE BLDC GLUCOMTR-MCNC: 256 MG/DL (ref 70–105)
GLUCOSE SERPL-MCNC: 256 MG/DL (ref 65–99)
HADV DNA SPEC NAA+PROBE: NOT DETECTED
HBA1C MFR BLD: 9.1 % (ref 4.8–5.6)
HCOV 229E RNA SPEC QL NAA+PROBE: NOT DETECTED
HCOV HKU1 RNA SPEC QL NAA+PROBE: NOT DETECTED
HCOV NL63 RNA SPEC QL NAA+PROBE: NOT DETECTED
HCOV OC43 RNA SPEC QL NAA+PROBE: NOT DETECTED
HCT VFR BLD AUTO: 40.4 % (ref 34–46.6)
HCT VFR BLD AUTO: 46.6 % (ref 34–46.6)
HGB BLD-MCNC: 13 G/DL (ref 12–15.9)
HGB BLD-MCNC: 14.8 G/DL (ref 12–15.9)
HMPV RNA NPH QL NAA+NON-PROBE: NOT DETECTED
HPIV1 RNA ISLT QL NAA+PROBE: NOT DETECTED
HPIV2 RNA SPEC QL NAA+PROBE: NOT DETECTED
HPIV3 RNA NPH QL NAA+PROBE: NOT DETECTED
HPIV4 P GENE NPH QL NAA+PROBE: NOT DETECTED
LYMPHOCYTES # BLD AUTO: 3.2 10*3/MM3 (ref 0.7–3.1)
LYMPHOCYTES # BLD AUTO: 4 10*3/MM3 (ref 0.7–3.1)
LYMPHOCYTES NFR BLD AUTO: 25.7 % (ref 19.6–45.3)
LYMPHOCYTES NFR BLD AUTO: 32.9 % (ref 19.6–45.3)
M PNEUMO IGG SER IA-ACNC: NOT DETECTED
MCH RBC QN AUTO: 28.1 PG (ref 26.6–33)
MCH RBC QN AUTO: 28.6 PG (ref 26.6–33)
MCHC RBC AUTO-ENTMCNC: 31.8 G/DL (ref 31.5–35.7)
MCHC RBC AUTO-ENTMCNC: 32.1 G/DL (ref 31.5–35.7)
MCV RBC AUTO: 87.6 FL (ref 79–97)
MCV RBC AUTO: 90 FL (ref 79–97)
METHADONE UR QL SCN: NEGATIVE
MONOCYTES # BLD AUTO: 0.8 10*3/MM3 (ref 0.1–0.9)
MONOCYTES # BLD AUTO: 1 10*3/MM3 (ref 0.1–0.9)
MONOCYTES NFR BLD AUTO: 6.5 % (ref 5–12)
MONOCYTES NFR BLD AUTO: 8.1 % (ref 5–12)
NEUTROPHILS NFR BLD AUTO: 56.6 % (ref 42.7–76)
NEUTROPHILS NFR BLD AUTO: 6.9 10*3/MM3 (ref 1.7–7)
NEUTROPHILS NFR BLD AUTO: 65.8 % (ref 42.7–76)
NEUTROPHILS NFR BLD AUTO: 8.2 10*3/MM3 (ref 1.7–7)
NRBC BLD AUTO-RTO: 0 /100 WBC (ref 0–0.2)
NRBC BLD AUTO-RTO: 0.1 /100 WBC (ref 0–0.2)
NT-PROBNP SERPL-MCNC: 154 PG/ML (ref 0–900)
OPIATES UR QL: NEGATIVE
OXYCODONE UR QL SCN: NEGATIVE
PLATELET # BLD AUTO: 290 10*3/MM3 (ref 140–450)
PLATELET # BLD AUTO: 311 10*3/MM3 (ref 140–450)
PMV BLD AUTO: 8.6 FL (ref 6–12)
PMV BLD AUTO: 8.7 FL (ref 6–12)
POTASSIUM SERPL-SCNC: 3.8 MMOL/L (ref 3.5–5.2)
PROCALCITONIN SERPL-MCNC: 0.06 NG/ML (ref 0–0.25)
PROCALCITONIN SERPL-MCNC: 0.07 NG/ML (ref 0–0.25)
PROT SERPL-MCNC: 7 G/DL (ref 6–8.5)
RBC # BLD AUTO: 4.61 10*6/MM3 (ref 3.77–5.28)
RBC # BLD AUTO: 5.18 10*6/MM3 (ref 3.77–5.28)
RHINOVIRUS RNA SPEC NAA+PROBE: DETECTED
RSV RNA NPH QL NAA+NON-PROBE: NOT DETECTED
SARS-COV-2 RNA NPH QL NAA+NON-PROBE: NOT DETECTED
SODIUM SERPL-SCNC: 139 MMOL/L (ref 136–145)
TSH SERPL DL<=0.05 MIU/L-ACNC: 2.17 UIU/ML (ref 0.27–4.2)
WBC NRBC COR # BLD: 12.3 10*3/MM3 (ref 3.4–10.8)
WBC NRBC COR # BLD: 12.5 10*3/MM3 (ref 3.4–10.8)

## 2023-05-13 PROCEDURE — G0378 HOSPITAL OBSERVATION PER HR: HCPCS

## 2023-05-13 PROCEDURE — 80053 COMPREHEN METABOLIC PANEL: CPT | Performed by: INTERNAL MEDICINE

## 2023-05-13 PROCEDURE — 70551 MRI BRAIN STEM W/O DYE: CPT

## 2023-05-13 PROCEDURE — 83880 ASSAY OF NATRIURETIC PEPTIDE: CPT | Performed by: INTERNAL MEDICINE

## 2023-05-13 PROCEDURE — 80307 DRUG TEST PRSMV CHEM ANLYZR: CPT | Performed by: INTERNAL MEDICINE

## 2023-05-13 PROCEDURE — 84145 PROCALCITONIN (PCT): CPT | Performed by: INTERNAL MEDICINE

## 2023-05-13 PROCEDURE — 83605 ASSAY OF LACTIC ACID: CPT | Performed by: PHYSICIAN ASSISTANT

## 2023-05-13 PROCEDURE — 99204 OFFICE O/P NEW MOD 45 MIN: CPT | Performed by: INTERNAL MEDICINE

## 2023-05-13 PROCEDURE — 85025 COMPLETE CBC W/AUTO DIFF WBC: CPT | Performed by: PHYSICIAN ASSISTANT

## 2023-05-13 PROCEDURE — 82948 REAGENT STRIP/BLOOD GLUCOSE: CPT

## 2023-05-13 PROCEDURE — 63710000001 INSULIN LISPRO (HUMAN) PER 5 UNITS: Performed by: INTERNAL MEDICINE

## 2023-05-13 PROCEDURE — 72170 X-RAY EXAM OF PELVIS: CPT

## 2023-05-13 PROCEDURE — 0202U NFCT DS 22 TRGT SARS-COV-2: CPT | Performed by: INTERNAL MEDICINE

## 2023-05-13 PROCEDURE — 97161 PT EVAL LOW COMPLEX 20 MIN: CPT

## 2023-05-13 PROCEDURE — 99222 1ST HOSP IP/OBS MODERATE 55: CPT | Performed by: PSYCHIATRY & NEUROLOGY

## 2023-05-13 PROCEDURE — 83735 ASSAY OF MAGNESIUM: CPT | Performed by: PHYSICIAN ASSISTANT

## 2023-05-13 PROCEDURE — 71045 X-RAY EXAM CHEST 1 VIEW: CPT

## 2023-05-13 PROCEDURE — 96361 HYDRATE IV INFUSION ADD-ON: CPT

## 2023-05-13 PROCEDURE — 85025 COMPLETE CBC W/AUTO DIFF WBC: CPT | Performed by: INTERNAL MEDICINE

## 2023-05-13 PROCEDURE — 87040 BLOOD CULTURE FOR BACTERIA: CPT | Performed by: PHYSICIAN ASSISTANT

## 2023-05-13 PROCEDURE — 63710000001 INSULIN GLARGINE PER 5 UNITS: Performed by: INTERNAL MEDICINE

## 2023-05-13 RX ORDER — RISPERIDONE 0.5 MG/1
0.25 TABLET, ORALLY DISINTEGRATING ORAL 2 TIMES DAILY PRN
Status: DISCONTINUED | OUTPATIENT
Start: 2023-05-13 | End: 2023-05-14 | Stop reason: HOSPADM

## 2023-05-13 RX ORDER — ACETAMINOPHEN 325 MG/1
650 TABLET ORAL EVERY 6 HOURS PRN
Status: DISCONTINUED | OUTPATIENT
Start: 2023-05-13 | End: 2023-05-14 | Stop reason: HOSPADM

## 2023-05-13 RX ORDER — BUPROPION HYDROCHLORIDE 150 MG/1
450 TABLET ORAL EVERY MORNING
Status: DISCONTINUED | OUTPATIENT
Start: 2023-05-13 | End: 2023-05-14 | Stop reason: HOSPADM

## 2023-05-13 RX ORDER — DEXTROSE MONOHYDRATE 25 G/50ML
25 INJECTION, SOLUTION INTRAVENOUS
Status: DISCONTINUED | OUTPATIENT
Start: 2023-05-13 | End: 2023-05-14 | Stop reason: HOSPADM

## 2023-05-13 RX ORDER — ONDANSETRON 4 MG/1
4 TABLET, FILM COATED ORAL EVERY 6 HOURS PRN
Status: DISCONTINUED | OUTPATIENT
Start: 2023-05-13 | End: 2023-05-14 | Stop reason: HOSPADM

## 2023-05-13 RX ORDER — AMLODIPINE BESYLATE 5 MG/1
5 TABLET ORAL
Status: DISCONTINUED | OUTPATIENT
Start: 2023-05-13 | End: 2023-05-14 | Stop reason: HOSPADM

## 2023-05-13 RX ORDER — NICOTINE POLACRILEX 4 MG
15 LOZENGE BUCCAL
Status: DISCONTINUED | OUTPATIENT
Start: 2023-05-13 | End: 2023-05-13 | Stop reason: SDUPTHER

## 2023-05-13 RX ORDER — INSULIN LISPRO 100 [IU]/ML
1-200 INJECTION, SOLUTION INTRAVENOUS; SUBCUTANEOUS
Status: DISCONTINUED | OUTPATIENT
Start: 2023-05-13 | End: 2023-05-13 | Stop reason: SDUPTHER

## 2023-05-13 RX ORDER — IBUPROFEN 600 MG/1
1 TABLET ORAL
Status: DISCONTINUED | OUTPATIENT
Start: 2023-05-13 | End: 2023-05-14 | Stop reason: HOSPADM

## 2023-05-13 RX ORDER — INSULIN LISPRO 100 [IU]/ML
7 INJECTION, SOLUTION INTRAVENOUS; SUBCUTANEOUS
Status: DISCONTINUED | OUTPATIENT
Start: 2023-05-13 | End: 2023-05-14 | Stop reason: HOSPADM

## 2023-05-13 RX ORDER — ISOSORBIDE MONONITRATE 60 MG/1
60 TABLET, EXTENDED RELEASE ORAL DAILY
Status: DISCONTINUED | OUTPATIENT
Start: 2023-05-13 | End: 2023-05-14 | Stop reason: HOSPADM

## 2023-05-13 RX ORDER — CHOLECALCIFEROL (VITAMIN D3) 125 MCG
5 CAPSULE ORAL NIGHTLY PRN
Status: DISCONTINUED | OUTPATIENT
Start: 2023-05-13 | End: 2023-05-14 | Stop reason: HOSPADM

## 2023-05-13 RX ORDER — HYDROCODONE BITARTRATE AND ACETAMINOPHEN 5; 325 MG/1; MG/1
1 TABLET ORAL EVERY 6 HOURS PRN
Status: DISCONTINUED | OUTPATIENT
Start: 2023-05-13 | End: 2023-05-14 | Stop reason: HOSPADM

## 2023-05-13 RX ORDER — SODIUM CHLORIDE 0.9 % (FLUSH) 0.9 %
10 SYRINGE (ML) INJECTION AS NEEDED
Status: DISCONTINUED | OUTPATIENT
Start: 2023-05-13 | End: 2023-05-14 | Stop reason: HOSPADM

## 2023-05-13 RX ORDER — INSULIN LISPRO 100 [IU]/ML
2-9 INJECTION, SOLUTION INTRAVENOUS; SUBCUTANEOUS
Status: DISCONTINUED | OUTPATIENT
Start: 2023-05-13 | End: 2023-05-14 | Stop reason: HOSPADM

## 2023-05-13 RX ORDER — IBUPROFEN 600 MG/1
1 TABLET ORAL
Status: DISCONTINUED | OUTPATIENT
Start: 2023-05-13 | End: 2023-05-13

## 2023-05-13 RX ORDER — METOPROLOL TARTRATE 50 MG/1
100 TABLET, FILM COATED ORAL 2 TIMES DAILY
Status: DISCONTINUED | OUTPATIENT
Start: 2023-05-13 | End: 2023-05-14 | Stop reason: HOSPADM

## 2023-05-13 RX ORDER — SODIUM CHLORIDE 9 MG/ML
40 INJECTION, SOLUTION INTRAVENOUS AS NEEDED
Status: DISCONTINUED | OUTPATIENT
Start: 2023-05-13 | End: 2023-05-14 | Stop reason: HOSPADM

## 2023-05-13 RX ORDER — CLOPIDOGREL BISULFATE 75 MG/1
75 TABLET ORAL DAILY
Status: DISCONTINUED | OUTPATIENT
Start: 2023-05-13 | End: 2023-05-14 | Stop reason: HOSPADM

## 2023-05-13 RX ORDER — ONDANSETRON 2 MG/ML
4 INJECTION INTRAMUSCULAR; INTRAVENOUS EVERY 6 HOURS PRN
Status: DISCONTINUED | OUTPATIENT
Start: 2023-05-13 | End: 2023-05-14 | Stop reason: HOSPADM

## 2023-05-13 RX ORDER — DEXTROSE MONOHYDRATE 25 G/50ML
10-50 INJECTION, SOLUTION INTRAVENOUS
Status: DISCONTINUED | OUTPATIENT
Start: 2023-05-13 | End: 2023-05-13

## 2023-05-13 RX ORDER — NICOTINE POLACRILEX 4 MG
15 LOZENGE BUCCAL
Status: DISCONTINUED | OUTPATIENT
Start: 2023-05-13 | End: 2023-05-14 | Stop reason: HOSPADM

## 2023-05-13 RX ORDER — INSULIN LISPRO 100 [IU]/ML
1-200 INJECTION, SOLUTION INTRAVENOUS; SUBCUTANEOUS AS NEEDED
Status: DISCONTINUED | OUTPATIENT
Start: 2023-05-13 | End: 2023-05-13

## 2023-05-13 RX ORDER — SODIUM CHLORIDE 0.9 % (FLUSH) 0.9 %
10 SYRINGE (ML) INJECTION EVERY 12 HOURS SCHEDULED
Status: DISCONTINUED | OUTPATIENT
Start: 2023-05-13 | End: 2023-05-14 | Stop reason: HOSPADM

## 2023-05-13 RX ADMIN — AMLODIPINE BESYLATE 5 MG: 5 TABLET ORAL at 13:34

## 2023-05-13 RX ADMIN — ACETAMINOPHEN 650 MG: 325 TABLET, FILM COATED ORAL at 19:46

## 2023-05-13 RX ADMIN — INSULIN LISPRO 6 UNITS: 100 INJECTION, SOLUTION INTRAVENOUS; SUBCUTANEOUS at 15:34

## 2023-05-13 RX ADMIN — INSULIN LISPRO 7 UNITS: 100 INJECTION, SOLUTION INTRAVENOUS; SUBCUTANEOUS at 18:32

## 2023-05-13 RX ADMIN — RISPERIDONE 0.25 MG: 0.5 TABLET, ORALLY DISINTEGRATING ORAL at 20:04

## 2023-05-13 RX ADMIN — Medication 10 ML: at 20:04

## 2023-05-13 RX ADMIN — SODIUM CHLORIDE, POTASSIUM CHLORIDE, SODIUM LACTATE AND CALCIUM CHLORIDE 125 ML/HR: 600; 310; 30; 20 INJECTION, SOLUTION INTRAVENOUS at 01:59

## 2023-05-13 RX ADMIN — INSULIN GLARGINE 24 UNITS: 100 INJECTION, SOLUTION SUBCUTANEOUS at 20:03

## 2023-05-13 RX ADMIN — METOPROLOL TARTRATE 100 MG: 50 TABLET ORAL at 20:03

## 2023-05-13 RX ADMIN — APIXABAN 2.5 MG: 2.5 TABLET, FILM COATED ORAL at 20:04

## 2023-05-13 RX ADMIN — CLOPIDOGREL BISULFATE 75 MG: 75 TABLET ORAL at 13:33

## 2023-05-13 RX ADMIN — INSULIN LISPRO 2 UNITS: 100 INJECTION, SOLUTION INTRAVENOUS; SUBCUTANEOUS at 18:32

## 2023-05-13 RX ADMIN — ISOSORBIDE MONONITRATE 60 MG: 60 TABLET, EXTENDED RELEASE ORAL at 13:34

## 2023-05-13 RX ADMIN — BUPROPION HYDROCHLORIDE 450 MG: 150 TABLET, EXTENDED RELEASE ORAL at 13:34

## 2023-05-13 RX ADMIN — Medication 10 ML: at 13:39

## 2023-05-13 NOTE — PLAN OF CARE
Problem: Fall Injury Risk  Goal: Absence of Fall and Fall-Related Injury  Outcome: Ongoing, Progressing   Goal Outcome Evaluation:   Pt admitted earlier this morning, a/o upon admission. Pt is on room air. Falls risk precautions initiated. Pt is educated on using the call light that is within reach. Pt can't remember all the medications and dosages that she is taking. Pt states that her daughter will bring the bottles later today for verification. Will cont to monitor.

## 2023-05-13 NOTE — H&P
"    [facility] Medicine Services  History & Physical    Patient Name: Blossom Martin  : 1965  MRN: [id number]  Primary Care Physician:  Collin Low APRN  Date of admission: 2023  Date and Time of Service: 2023 at 2000 hrs.    Subjective      Chief Complaint: Altered mental status    History of Present Illness: Blossom Martin is a 57 y.o. female who presented to [facility] on 2023 with an altered mental status.  Family is not present at time my exam but ED provider notes that they reported patient had been acting unusual and seem to be having some hallucinations over the past several days which became worse on the day of presentation.  Multiple falls were also reported.  At the time of my exam patient reports that she has been having some nausea and vomiting complaining of decreased appetite and significant polydipsia.  She is uncertain if she has had increased urine output or any other symptoms of dysuria.  Patient is alert and oriented to place at the time of my exam and when asked her name she states \"everyone has been calling me Blossom\".  She is not oriented to month or year.  Patient denies any focal neurologic deficits and none were noted on exam.  She does not appear anxious, agitated or irritated throughout the exam.  She denies any previous methamphetamine use      Review of Systems   Unable to perform ROS: mental status change        Personal History     Past Medical History:   Diagnosis Date   • CAD (coronary artery disease)    • Diabetes    • DVT (deep venous thrombosis)    • Hypertension        Past Surgical History:   Procedure Laterality Date   • ANGIOPLASTY  2022    failed peripheral angioplasty by Dr Kurtz   • ANGIOPLASTY ILIAC ARTERY Bilateral 2022    Procedure: BILATERAL COMMON ILIAC ARTERY ANGIOPLASTY AND STENT PLACEMENT, RIGHT EXTERNAL ILIAC ARTERY ANGIOPLASTY AND STENT, RIGHT EXTERNAL ILIAC ARTERY DRUG COATED BALLOON ANGIOPLASTY AND " RIGHT LOWER EXTREMITY ANGIOGRAM;  Surgeon: Shirley Kurtz MD;  Location: [facility] MAIN OR;  Service: Vascular;  Laterality: Bilateral;   • CARDIAC CATHETERIZATION N/A 9/8/2022    Procedure: Thrombolysis-peripheral;  Surgeon: Shirley Kurtz MD;  Location: [facility] CATH INVASIVE LOCATION;  Service: Cardiovascular;  Laterality: N/A;   • ILIAC ARTERY STENT Bilateral     At [facility]   • LEFT HEART CATH         Family History: family history includes No Known Problems in her father and mother. Otherwise pertinent FHx was reviewed and not pertinent to current issue.    Social History:  reports that she has never smoked. She has never used smokeless tobacco. She reports that she does not drink alcohol and does not use drugs.    Home Medications:  Prior to Admission Medications     Prescriptions Last Dose Informant Patient Reported? Taking?    Accu-Chek Softclix Lancets lancets   No No    1 each by Other route 3 (Three) Times a Day Before Meals. 1 each by Other route 3 (Three) Times a Day Before Meals. Use as instructed. Dx code: E11.65    amLODIPine (NORVASC) 5 MG tablet   No No    Take 1 tablet by mouth Daily.    apixaban (ELIQUIS) 2.5 MG tablet tablet   No No    Take 1 tablet by mouth Every 12 (Twelve) Hours. Indications: Post Surgical - Other    apixaban (ELIQUIS) 2.5 MG tablet tablet   No No    Take 1 tablet by mouth 2 (Two) Times a Day.    Blood Glucose Monitoring Suppl (Accu-Chek Guide Me) w/Device kit   No No    use 3 times daily before meals    buPROPion XL (FORFIVO XL) 450 MG 24 hr tablet   Yes No    Take 450 mg by mouth Every Morning.    Cholecalciferol (Vitamin D3) 1.25 MG (72043 UT) capsule   Yes No    Take 50,000 Units by mouth Every 7 (Seven) Days. Wednesdays    clopidogrel (PLAVIX) 75 MG tablet   No No    Take 1 tablet by mouth Daily.    DULoxetine (CYMBALTA) 20 MG capsule   Yes No    Take 40 mg by mouth Daily.    glimepiride (AMARYL) 2 MG tablet   Yes No    Take 2 mg by mouth Daily.    glucose  blood (Accu-Chek Guide) test strip   No No    1 each by Other route 3 (Three) Times a Day Before Meals. 1 each by Other route 3 (Three) Times a Day Before Meals. Dx code: E11.65    hydrOXYzine (ATARAX) 50 MG tablet   Yes No    Take 100 mg by mouth Every Night.    insulin detemir (LEVEMIR) 100 UNIT/ML injection   No No    Inject 20 Units under the skin into the appropriate area as directed Daily    Insulin Pen Needle 32G X 4 MM misc   No No    use daily with insulin    Alcohol Swabs (Alcohol Prep) 70 % pads   No No    Apply 1 each topically 3 (Three) Times a Day Before Meals.    isosorbide mononitrate (IMDUR) 60 MG 24 hr tablet   Yes No    Take 60 mg by mouth Daily.    metoprolol tartrate (LOPRESSOR) 100 MG tablet   Yes No    Take 100 mg by mouth 2 (Two) Times a Day.    SITagliptin (JANUVIA) 100 MG tablet   Yes No    Take 100 mg by mouth Daily.            Allergies:  Allergies   Allergen Reactions   • Aspirin GI Intolerance and Other (See Comments)     Nausea and vomiting      • Hydrocodone Itching       Objective      Vitals:   Temp:  [98.5 °F (36.9 °C)] 98.5 °F (36.9 °C)  Heart Rate:  [71-77] 77  Resp:  [18-20] 18  BP: (154-183)/(72-82) 154/81    Physical Exam  Constitutional:       General: She is not in acute distress.     Appearance: Normal appearance. She is normal weight. She is not ill-appearing, toxic-appearing or diaphoretic.   HENT:      Head: Normocephalic.      Right Ear: External ear normal.      Left Ear: External ear normal.      Nose: Nose normal.      Mouth/Throat:      Mouth: Mucous membranes are dry.   Eyes:      Extraocular Movements: Extraocular movements intact.      Pupils: Pupils are equal, round, and reactive to light.   Cardiovascular:      Rate and Rhythm: Normal rate and regular rhythm.      Pulses: Normal pulses.   Pulmonary:      Effort: Pulmonary effort is normal.      Breath sounds: Normal breath sounds.   Abdominal:      General: Bowel sounds are normal.      Palpations: Abdomen is  soft.   Musculoskeletal:         General: Normal range of motion.      Cervical back: Normal range of motion.      Right lower leg: No edema.      Left lower leg: No edema.   Skin:     General: Skin is warm and dry.      Capillary Refill: Capillary refill takes less than 2 seconds.   Neurological:      General: No focal deficit present.      Mental Status: She is alert. She is disoriented.      Motor: No weakness.   Psychiatric:         Mood and Affect: Mood normal.         Behavior: Behavior normal.         Thought Content: Thought content normal.         Judgment: Judgment normal.          Result Review    Result Review:  I have personally reviewed the results from the time of this admission to 5/12/2023 20:01 EDT and agree with these findings:  [x]  Laboratory  []  Microbiology  [x]  Radiology  [x]  EKG/Telemetry   []  Cardiology/Vascular   []  Pathology  []  Old records  []  Other:  Most notable findings include: Troponin, CBC, CMP, UA, INR/PT, CT of C-spine, CT of head and EKG      Assessment & Plan        Active Hospital Problems:  Active Hospital Problems    Diagnosis    • **Altered mental status    • Hallucinations    • History of DVT (deep vein thrombosis)    • CAD (coronary artery disease)    • Diabetes mellitus    • Tobacco abuse      Plan:     Altered mental status with hallucinations  -Patient is afebrile with a heart rate within normal limits and is hypertensive with a blood pressure as high as 183/72 during her admission, currently 154/81  -Mild leukocytosis with WBCs of 12.00 with an increased absolute neutrophil lymphocyte count noted on differential, check procalcitonin  -Anion gap: 16.0, check lactic acid  -INR: Less than 0.93, PT: 25.6  -UA showed no bacteria with 0-2 WBCs, 2+ protein, 1+ ketones, 1+ glucose and a specific gravity of 1.030  -Ethanol and acetaminophen level within normal limits  -Urine drug screen positive for amphetamines  -CT of C-spine shows no acute fracture or osseous  malalignment with mild degenerative disease noted  -CT of head without contrast noted some motion limited examination with no gross intracranial abnormality  -EKG shows no acute changes  -Check blood cultures, CK, lactic acid, procalcitonin, TSH and A1c  -Neurochecks  -May benefit from further imaging or neurology/psychiatry consult based on above findings and clinical course  -Small bolus of LR followed by infusion ordered as patient does appear somewhat dehydrated with close monitoring of blood pressure planned  -Continue telemetry and maintain falls precautions    CAD  -EKG shows sinus rhythm at 72 with some artifact noted no obvious acute changes with a QTc of 428  Continue metoprolol, Imdur, Plavix and Eliquis  -Telemetry    Diabetes mellitus  -Poorly controlled   Lab Results   Component Value Date    GLUCOSE 245 (H) 05/12/2023    GLUCOSE 205 (H) 04/14/2023    GLUCOSE 241 (H) 09/16/2022    GLUCOSE 277 (H) 09/12/2022   -Hold glimepiride and Januvia  -Check A1c  -Weight-based subcutaneous Glucomander protocol ordered  -Diabetic diet  -Monitor AC and HS    Hypertension  -Poorly controlled   BP Readings from Last 1 Encounters:   05/12/23 154/81   - Continue metoprolol  - Monitor while admitted      History of DVT  -No signs of hemorrhage on CT of head  -Continue Eliquis    Depression/anxiety  -Atarax, Cymbalta and bupropion    Tobacco abuse  -Encourage cessation  -Nicotine patch ordered      DVT prophylaxis:  No DVT prophylaxis order currently exists.    CODE STATUS:       Admission Status:  I believe this patient meets observation status.    I discussed the patient's findings and my recommendations with patient and nursing staff.    Signature: Electronically signed by New Sánchez PA-C, 05/12/23, 20:01 EDT.  [facility] Hospitalist Team

## 2023-05-13 NOTE — PROGRESS NOTES
[facility] Medicine Services Daily Progress Note    Patient Name: Blossom Martin  : 1965  MRN: [id number]  Primary Care Physician:  Collin Low APRN  Date of admission: 2023      Subjective      Chief Complaint: AMS    Subjective   Patient now noted alert and oriented times 3, pt not happy that she has been NPO. Not happy that her urine tested positive for amphetamine. Denies doing amphetamine.          Objective      Vitals:   Temp:  [97.6 °F (36.4 °C)-98.5 °F (36.9 °C)] 98.2 °F (36.8 °C)  Heart Rate:  [71-91] 91  Resp:  [14-20] 15  BP: (146-183)/(69-87) 179/87    Physical Exam  Vitals and nursing note reviewed.   Constitutional:       General: She is not in acute distress.     Appearance: Normal appearance. She is well-developed. She is not ill-appearing, toxic-appearing or diaphoretic.   HENT:      Head: Normocephalic and atraumatic.      Right Ear: Ear canal and external ear normal.      Left Ear: Ear canal and external ear normal.      Nose: Nose normal. No congestion or rhinorrhea.      Mouth/Throat:      Mouth: Mucous membranes are moist.      Pharynx: No oropharyngeal exudate.   Eyes:      General: No scleral icterus.        Right eye: No discharge.         Left eye: No discharge.      Extraocular Movements: Extraocular movements intact.      Conjunctiva/sclera: Conjunctivae normal.      Pupils: Pupils are equal, round, and reactive to light.   Neck:      Thyroid: No thyromegaly.      Vascular: No carotid bruit or JVD.      Trachea: No tracheal deviation.   Cardiovascular:      Rate and Rhythm: Normal rate and regular rhythm.      Pulses: Normal pulses.      Heart sounds: Normal heart sounds. No murmur heard.    No friction rub. No gallop.   Pulmonary:      Effort: Pulmonary effort is normal. No respiratory distress.      Breath sounds: Normal breath sounds. No stridor. No wheezing, rhonchi or rales.   Chest:      Chest wall: No tenderness.   Abdominal:       General: Bowel sounds are normal. There is no distension.      Palpations: Abdomen is soft. There is no mass.      Tenderness: There is no abdominal tenderness. There is no guarding or rebound.      Hernia: No hernia is present.   Musculoskeletal:         General: No swelling, tenderness, deformity or signs of injury. Normal range of motion.      Cervical back: Normal range of motion and neck supple. No rigidity. No muscular tenderness.      Right lower leg: No edema.      Left lower leg: No edema.   Lymphadenopathy:      Cervical: No cervical adenopathy.   Skin:     General: Skin is warm and dry.      Coloration: Skin is not jaundiced or pale.      Findings: No bruising, erythema or rash.   Neurological:      General: No focal deficit present.      Mental Status: She is alert and oriented to person, place, and time. Mental status is at baseline.      Cranial Nerves: No cranial nerve deficit.      Sensory: No sensory deficit.      Motor: No weakness or abnormal muscle tone.      Coordination: Coordination normal.   Psychiatric:         Mood and Affect: Mood normal.         Behavior: Behavior normal.         Thought Content: Thought content normal.         Judgment: Judgment normal.                 Result Review    Result Review:  I have personally reviewed the results from the time of this admission to 5/13/2023 10:23 EDT and agree with these findings:  [x]  Laboratory  [x]  Microbiology  [x]  Radiology  []  EKG/Telemetry   []  Cardiology/Vascular   []  Pathology  []  Old records  []  Other:  Most notable findings include:           Assessment & Plan      Brief Patient Summary:  Blossom Martin is a 57 y.o. female who         lactated ringers, 75 mL/hr, Last Rate: 75 mL/hr (05/13/23 0925)         Active Hospital Problems:  Active Hospital Problems    Diagnosis    • **Altered mental status    • History of DVT (deep vein thrombosis)    • Hallucinations    • Altered mental status, unspecified altered mental status type     • CAD (coronary artery disease)    • Tobacco abuse    • Diabetes mellitus      Plan:   Altered mental status with hallucinations resolved now.   -Patient is afebrile with a heart rate within normal limits and is hypertensive with a blood pressure as high as 183/72 during her admission, currently 154/81  -Mild leukocytosis with WBCs of 12.00 with an increased absolute neutrophil lymphocyte count noted on differential,  -INR: Less than 0.93, PT: 25.6  -UA showed no bacteria with 0-2 WBCs, 2+ protein, 1+ ketones, 1+ glucose and a specific gravity of 1.030  -Ethanol and acetaminophen level within normal limits  -Urine drug screen positive for amphetamines  -CT of C-spine shows no acute fracture or osseous malalignment with mild degenerative disease noted  -CT of head without contrast noted some motion limited examination with no gross intracranial abnormality  -EKG shows no acute changes  -  -Neurochecks  -May benefit from further imaging or neurology/psychiatry consult based on above findings and clinical course  -Small bolus of LR followed by infusion ordered as patient does appear somewhat dehydrated with close monitoring of blood pressure planned  -Continue telemetry and maintain falls precautions     CAD  -EKG shows sinus rhythm at 72 with some artifact noted no obvious acute changes with a QTc of 428  Continue metoprolol, Imdur, Plavix and Eliquis  -Telemetry    Pt will be seen by Dr. Fernandez from tomorrow.            DVT prophylaxis:  No DVT prophylaxis order currently exists.    CODE STATUS:    Code Status (Patient has no pulse and is not breathing): CPR (Attempt to Resuscitate)  Medical Interventions (Patient has pulse or is breathing): Full Support      Disposition:  I expect patient to be discharged as per clinical course..    This patient has been examined wearing appropriate Personal Protective Equipment and discussed with hospital infection control department. 05/13/23      Electronically signed by  Myles Balbuena MD, 05/13/23, 10:23 EDT.  [facility] Hospitalist Team

## 2023-05-13 NOTE — PLAN OF CARE
Goal Outcome Evaluation:            Pt very pleasant and cooperative. Family at bedside and very supportive. Pt chest xray, ct, mri all show nothing acute. Pt participated in PT, denies any pain, nausea/vomiting and is tolerating meals well. Pt fluids were discontinued, SSI started and medication adjustments made per providers. Psych, Endo, PT will continue to follow up. Will continue to monitor and document as needed.

## 2023-05-13 NOTE — CONSULTS
Inpatient Endocrine Consult  Consultation requested by hospitalist team for uncontrolled type 2 diabetes  Patient Care Team:  Collin Low APRN as PCP - General    Chief Complaint: Uncontrolled type 2 diabetes    HPI: This is a 57-year-old female with past medical history of type 2 diabetes for last 30 years, hypertension, hyperlipidemia came in with change in the mental status.  She is having some trouble with blood sugar management endocrine consultation is requested for evaluation and management.  Patient tells me that she takes Ozempic 1 mg once a week for the last 3 years at least.  She is not taking any other medications for diabetes.  Her list included glimepiride, Januvia and Levemir but she specifically told me that she is not taking those at home.  She does not check blood sugars at home.  She did have some chest pains and blurred vision that is why she came to the hospital for evaluation.  She is eating well at this time.    Past Medical History:   Diagnosis Date   • CAD (coronary artery disease)    • Diabetes    • DVT (deep venous thrombosis)    • Hypertension        Social History     Socioeconomic History   • Marital status:    Tobacco Use   • Smoking status: Never   • Smokeless tobacco: Never   Vaping Use   • Vaping Use: Never used   Substance and Sexual Activity   • Alcohol use: Never   • Drug use: Never   • Sexual activity: Defer       Family History   Problem Relation Age of Onset   • No Known Problems Mother    • No Known Problems Father        Allergies   Allergen Reactions   • Aspirin GI Intolerance and Other (See Comments)     Nausea and vomiting      • Hydrocodone Itching       ROS:   Constitutional:  Denies fatigue, tiredness.    Eyes:  Denies change in visual acuity   HENT:  Denies nasal congestion or sore throat   Respiratory: Denies cough, shortness of breath.   Cardiovascular:  Denies chest pain, edema   GI:  Denies abdominal pain, nausea, vomiting.   :  Denies  polyuria and polydipsia  Musculoskeletal:  Denies back pain or joint pain   Integument:  Denies dry skin, rash   Neurologic:  Denies headache, focal weakness or sensory changes   Endocrine:  Denies polyuria or polydipsia   Psychiatric:  Denies depression or anxiety      Vitals:    05/13/23 1334   BP: 151/80   Pulse: 88   Resp:    Temp:    SpO2:       Body mass index is 27.02 kg/m².     Physical Exam:  GEN: NAD, conversant  EYES: EOMI, PERRL, no conjunctival erythema  NECK: no thyromegaly, full ROM   CV: RRR, no murmurs/rubs/gallops, no peripheral edema  LUNG: CTAB, no wheezes/rales/rhonchi  SKIN: no rashes, no acanthosis  MSK: no deformities, full ROM of all extremities  NEURO: no tremors, DTR normal  PSYCH: AOX3, appropriate mood, affect normal      Results Review:     I reviewed the patient's new clinical results.    Lab Results   Component Value Date    GLUCOSE 256 (H) 05/13/2023    BUN 7 05/13/2023    CREATININE 0.66 05/13/2023    EGFRIFNONA 105 05/27/2021    EGFRIFAFRI >60 12/10/2018    BCR 10.6 05/13/2023    K 3.8 05/13/2023    CO2 21.0 (L) 05/13/2023    CALCIUM 9.2 05/13/2023    ALBUMIN 4.4 05/13/2023    AST 21 05/13/2023    ALT 24 05/13/2023       Lab Results   Component Value Date    HGBA1C 9.10 (H) 05/12/2023    HGBA1C 10.2 (H) 09/07/2022     Lab Results   Component Value Date    CREATININE 0.66 05/13/2023     Results from last 7 days   Lab Units 05/12/23  1735   GLUCOSE mg/dL 213*       Medication Review: Reviewed.       Current Facility-Administered Medications:   •  amLODIPine (NORVASC) tablet 5 mg, 5 mg, Oral, Q24H, Robert Fernandez MD, 5 mg at 05/13/23 1334  •  apixaban (ELIQUIS) tablet 2.5 mg, 2.5 mg, Oral, BID, Robert Fernandez MD  •  buPROPion XL (WELLBUTRIN XL) 24 hr tablet 450 mg, 450 mg, Oral, QAM, Robert Fernandez MD, 450 mg at 05/13/23 1334  •  clopidogrel (PLAVIX) tablet 75 mg, 75 mg, Oral, Daily, Robert Fernandez MD, 75 mg at 05/13/23 1333  •  dextrose (D50W) (25 g/50 mL) IV  injection 10-50 mL, 10-50 mL, Intravenous, Q15 Min PRN, New Sánchez PA-C  •  dextrose (D50W) (25 g/50 mL) IV injection 25 g, 25 g, Intravenous, Q15 Min PRN, Robert Fernandez MD  •  dextrose (GLUTOSE) oral gel 15 g, 15 g, Oral, Q15 Min PRN, Robert Fernandez MD  •  glucagon (GLUCAGEN) injection 1 mg, 1 mg, Intramuscular, Q15 Min PRN, Robert Fernandez MD  •  insulin glargine (LANTUS, SEMGLEE) injection 1-200 Units, 1-200 Units, Subcutaneous, Nightly - Gonzalo Arriaza Christopher, PA-C  •  insulin glargine (LANTUS, SEMGLEE) injection 20 Units, 20 Units, Subcutaneous, Nightly, Robert Fernandez MD  •  insulin lispro (HUMALOG/ADMELOG) injection 1-200 Units, 1-200 Units, Subcutaneous, PRN, New Sánchez PA-C  •  insulin lispro (HUMALOG/ADMELOG) injection 2-9 Units, 2-9 Units, Subcutaneous, TID With Meals, Robert Fernandez MD  •  isosorbide mononitrate (IMDUR) 24 hr tablet 60 mg, 60 mg, Oral, Daily, Robert Fernandez MD, 60 mg at 05/13/23 1334  •  melatonin tablet 5 mg, 5 mg, Oral, Nightly PRN, New Sánchez PA-C  •  metoprolol tartrate (LOPRESSOR) tablet 100 mg, 100 mg, Oral, BID, Robert Fernandez MD  •  ondansetron (ZOFRAN) tablet 4 mg, 4 mg, Oral, Q6H PRN **OR** ondansetron (ZOFRAN) injection 4 mg, 4 mg, Intravenous, Q6H PRN, New Sánchez PA-C  •  risperiDONE (risperDAL M-TABS) disintegrating tablet 0.25 mg, 0.25 mg, Oral, BID PRN, Aarti Anderson MD  •  [COMPLETED] Insert Peripheral IV, , , Once **AND** sodium chloride 0.9 % flush 10 mL, 10 mL, Intravenous, PRN, New Sánchez PA-C  •  [COMPLETED] Insert Peripheral IV, , , Once **AND** sodium chloride 0.9 % flush 10 mL, 10 mL, Intravenous, PRN, New Sánchez PA-C  •  sodium chloride 0.9 % flush 10 mL, 10 mL, Intravenous, Q12H, New Sánchez PA-C, 10 mL at 05/13/23 1339  •  sodium chloride 0.9 % flush 10 mL, 10 mL, Intravenous, PRN, New Sánchez PA-C  •  sodium chloride 0.9 %  infusion 40 mL, 40 mL, Intravenous, PRN, New Sánchez PA-C          Assessment and plan:  Diabetes mellitus type 2 with hyperglycemia: Uncontrolled, A1c is high at 9.1%.  Will DC Ozempic and add basal bolus insulin therapy with Lantus and Humalog plus Humalog sliding scale and follow blood sugars and make adjustments as needed.    Hypertension: Fair control    Hyperlipidemia: Not on any medications.    Major depression: Followed by psychiatrist.    Thank you very much for the consultation        Katina Frazier MD FACE.

## 2023-05-13 NOTE — CONSULTS
Referring Provider: Dr Fernandez  Reason for Consultation: delirium      Chief complaint confusion    Subjective .     History of present illness:  The patient is a 57 y.o. female who was admitted secondary to altered mental status. PMHx: CAD, DM, HTN. Psych consult was requested by Dr Fernandez 2ry to altered mental status.  The pt had been acting unusual, was responding to internal stimuli , hallucinations became worse over the past few days, multiple falls reported.   The pt herself acknowledged hx of depression and she was on wellbutrin and cymbalta for that, mood was stable last few years, the pt lives with her daughter and her family, taking care of grand children   Everything was as usual until few days ago when the pt started sending bizarre messages and the pt had no recollection about that.  The pt denied any recent medication changes and she claimed to be complaint with meds  The pt denied alc/drug use  No sxs of lashawn /hypomania   Past psych hx: depression, no hx of suicides     Review of Systems   All systems were reviewed and negative except for:  Constitution:  positive for fatigue  Behavioral/Psych: positive for  hallucinations    History    Past Medical History:   Diagnosis Date   • CAD (coronary artery disease)    • Diabetes    • DVT (deep venous thrombosis)    • Hypertension           Family History   Problem Relation Age of Onset   • No Known Problems Mother    • No Known Problems Father    FHx: daughter - some mood changes, was on lexapro      Social History     Tobacco Use   • Smoking status: Never   • Smokeless tobacco: Never   Vaping Use   • Vaping Use: Never used   Substance Use Topics   • Alcohol use: Never   • Drug use: Never          Medications Prior to Admission   Medication Sig Dispense Refill Last Dose   • buPROPion XL (FORFIVO XL) 450 MG 24 hr tablet Take 1 tablet by mouth Every Morning.   5/12/2023   • Accu-Chek Softclix Lancets lancets 1 each by Other route 3 (Three) Times a Day  Before Meals. 1 each by Other route 3 (Three) Times a Day Before Meals. Use as instructed. Dx code: E11.65 100 each 2    • amLODIPine (NORVASC) 5 MG tablet Take 1 tablet by mouth Daily. 30 tablet 2 5/11/2023   • apixaban (ELIQUIS) 2.5 MG tablet tablet Take 1 tablet by mouth 2 (Two) Times a Day. 12 tablet 0 5/11/2023   • Blood Glucose Monitoring Suppl (Accu-Chek Guide Me) w/Device kit use 3 times daily before meals 1 kit 0    • Cholecalciferol (Vitamin D3) 1.25 MG (65263 UT) capsule Take 1 capsule by mouth Every 7 (Seven) Days. Wednesdays      • clopidogrel (PLAVIX) 75 MG tablet Take 1 tablet by mouth Daily. 30 tablet 2 5/11/2023   • DULoxetine (CYMBALTA) 20 MG capsule Take 2 capsules by mouth Daily.   5/11/2023   • glimepiride (AMARYL) 2 MG tablet Take 1 tablet by mouth Daily.   Unknown   • glucose blood (Accu-Chek Guide) test strip 1 each by Other route 3 (Three) Times a Day Before Meals. 1 each by Other route 3 (Three) Times a Day Before Meals. Dx code: E11.65 100 each 2    • hydrOXYzine (ATARAX) 50 MG tablet Take 2 tablets by mouth Every Night.   5/11/2023   • insulin detemir (LEVEMIR) 100 UNIT/ML injection Inject 20 Units under the skin into the appropriate area as directed Daily 15 mL 2 More than a month   • Insulin Pen Needle 32G X 4 MM misc use daily with insulin 100 each 2    • Alcohol Swabs (Alcohol Prep) 70 % pads Apply 1 each topically 3 (Three) Times a Day Before Meals. 100 each 2    • isosorbide mononitrate (IMDUR) 60 MG 24 hr tablet Take 1 tablet by mouth Daily.   5/11/2023   • metoprolol tartrate (LOPRESSOR) 100 MG tablet Take 1 tablet by mouth 2 (Two) Times a Day.   5/11/2023   • SITagliptin (JANUVIA) 100 MG tablet Take 1 tablet by mouth Daily.   Unknown        Scheduled Meds:  amLODIPine, 5 mg, Oral, Q24H  apixaban, 2.5 mg, Oral, BID  buPROPion XL, 450 mg, Oral, QAM  clopidogrel, 75 mg, Oral, Daily  insulin glargine, 1-200 Units, Subcutaneous, Nightly - Glucommander  insulin glargine, 20 Units,  "Subcutaneous, Nightly  insulin lispro, 2-9 Units, Subcutaneous, TID With Meals  isosorbide mononitrate, 60 mg, Oral, Daily  metoprolol tartrate, 100 mg, Oral, BID  sodium chloride, 10 mL, Intravenous, Q12H         Continuous Infusions:  lactated ringers, 75 mL/hr, Last Rate: 75 mL/hr (05/13/23 0925)        PRN Meds:  •  dextrose  •  dextrose  •  dextrose  •  glucagon (human recombinant)  •  insulin lispro  •  melatonin  •  ondansetron **OR** ondansetron  •  [COMPLETED] Insert Peripheral IV **AND** sodium chloride  •  [COMPLETED] Insert Peripheral IV **AND** sodium chloride  •  sodium chloride  •  sodium chloride      Allergies:  Aspirin and Hydrocodone      Objective     Vital Signs   /87 (BP Location: Right arm, Patient Position: Sitting)   Pulse 91   Temp 98.2 °F (36.8 °C) (Oral)   Resp 15   Ht 160 cm (63\")   Wt 69.2 kg (152 lb 8.9 oz)   SpO2 95%   BMI 27.02 kg/m²     Physical Exam:    Musculoskeletal:   Muscle strength and tone: WNL  Abnormal Movements: none   Gait: stable      General Appearance:    In NAD       Mental Status Exam:   Hygiene:   good  Cooperation:  Cooperative  Eye Contact:  Good  Behavior and Psychomotor Activity: Appropriate  Speech:  Normal  Mood: \"all right now\"   Affect:  Appropriate  Thought Process:  Goal directed, Linear and Organized  Associations: Intact   Thought Content:  mood congruent   Language: appropriate   Suicidal Ideations:  None  Homicidal:  None  Hallucinations:  Not demonstrated today  Delusion:  None  Orientation:  To person, Place, Time and Situation  Memory:  no recollection about events leading to the admission   Concentration and computation:fair   Attention span: fair  Fund of knowledge: appropriate   Reliability:  fair  Insight:  Fair  Judgement:  Fair  Impulse Control:  Fair      Medications and allergies reviewed      Lab Results   Component Value Date    GLUCOSE 245 (H) 05/12/2023    CALCIUM 9.9 05/12/2023     05/12/2023    K 4.2 05/12/2023    " CO2 23.0 05/12/2023     05/12/2023    BUN 10 05/12/2023    CREATININE 0.77 05/12/2023    EGFRIFAFRI >60 12/10/2018    EGFRIFNONA 105 05/27/2021    BCR 13.0 05/12/2023    ANIONGAP 16.0 (H) 05/12/2023       Last Urine Toxicity         Latest Ref Rng & Units 5/12/2023   LAST URINE TOXICITY RESULTS   Barbiturates Screen, Urine Negative Negative     Benzodiazepine Screen, Urine Negative Negative     Cocaine Screen, Urine Negative Negative     Methadone Screen , Urine Negative Negative                  No results found for: PHENYTOIN, PHENOBARB, VALPROATE, CBMZ    Lab Results   Component Value Date     05/12/2023    BUN 10 05/12/2023    CREATININE 0.77 05/12/2023    TSH 2.170 05/12/2023    WBC 12.50 (H) 05/13/2023       Brief Urine Lab Results  (Last result in the past 365 days)      Color   Clarity   Blood   Leuk Est   Nitrite   Protein   CREAT   Urine HCG        05/12/23 1815 Dark Yellow   Clear   Negative   Negative   Negative   100 mg/dL (2+)                 Assessment & Plan       Altered mental status    Diabetes mellitus    Tobacco abuse    CAD (coronary artery disease)    History of DVT (deep vein thrombosis)    Hallucinations    Altered mental status, unspecified altered mental status type          Assessment: Major depressive d/o in remission   Delirium due to medical condition (TME)    Treatment Plan: the pt presented with transient confusional episodes, she has underlying depression that was well controlled on current meds  Cont Bupropion  mg po QAM  Restart duloxetine 40 mg po QHS when BP gets stable, it was dc due to elevated /87  Based on acuity of mental status changes, her sxs are most likely due to medical condition since medication changes already ruled out   UDS was + for meth but it might be false positive for wellbutrin   The pt is getting MRI  Cont to provide support  Start low dose of risperidone 0.25 mg po BID PRN for hallucinations, severe agitation   The pt can be d/c  when medically stable   Treatment Plan discussed with: Patient, Family and nursing     I discussed the patients findings and my recommendations with patient, family and nursing staff    I have reviewed and approved the behavioral health treatment plans and problem list. Yes  Thank you for the consult   Referring MD has access to consult report and progress notes in EMR       This document has been electronically signed by Aarti Anderson MD  May 13, 2023 12:03 EDT    Part of this note may be an electronic transcription/translation of spoken language to printed text using the Dragon Dictation System.

## 2023-05-13 NOTE — PROGRESS NOTES
"    [facility] Medicine Services Daily Progress Note    Patient Name: Blossom Martin  : 1965  MRN: [id number]  Primary Care Physician:  Collin Low APRN  Date of admission: 2023      Subjective      Chief Complaint: Altered mental status     History of Present Illness: Blossom Martin is a 57 y.o. female who presented to [facility] on 2023 with an altered mental status.  Family is not present at time my exam but ED provider notes that they reported patient had been acting unusual and seem to be having some hallucinations over the past several days which became worse on the day of presentation.  Multiple falls were also reported.  At the time of my exam patient reports that she has been having some nausea and vomiting complaining of decreased appetite and significant polydipsia.  She is uncertain if she has had increased urine output or any other symptoms of dysuria.  Patient is alert and oriented to place at the time of my exam and when asked her name she states \"everyone has been calling me Blossom\".  She is not oriented to month or year.  Patient denies any focal neurologic deficits and none were noted on exam.  She does not appear anxious, agitated or irritated throughout the exam.  She denies any previous methamphetamine use     2023 patient seen and examined in bed no acute distress, mental status improved.  Patient is very upset.Not happy that her urine tested positive for amphetamine. Denies doing amphetamine.   We were asked by nurse to see this patient.  Complaining of right hip pain.  Order x-ray of the right hip.  Patient also complains of visual hallucinations, psychiatry consulted.  Discussed with RN.  MRI of the brain ordered.  Neurology consulted.  Medications reconciled.  Insulin sliding scale added.  Blood cultures obtained.  Repeat blood work done.  Laboratory test reviewed.  Long discussion with patient, family members, and nurse for over " 45 minutes.  Apparently patient has been having difficulty with her insurance.  Says she is not wanted to take so many medicines.  Had a recent fall.  Traumatized the right hip.  Did not take the blood pressure medicines yesterday or today.  Restarted.      Review of Systems   Review of Systems   Constitutional: Negative.   HENT: Negative.    Eyes: Negative.    Cardiovascular: Negative.    Respiratory: Negative.    Endocrine: Negative.    Hematologic/Lymphatic: Negative.    Skin: Negative.    Musculoskeletal: Positive for joint pain.   Gastrointestinal: Negative.    Genitourinary: Negative.    Neurological: Negative.    Psychiatric/Behavioral: Positive for altered mental status.   Allergic/Immunologic: Negative.    All other systems reviewed and are negative.      Objective      Vitals:   Temp:  [97.6 °F (36.4 °C)-98.5 °F (36.9 °C)] 98.2 °F (36.8 °C)  Heart Rate:  [71-91] 88  Resp:  [14-20] 15  BP: (146-183)/(69-87) 151/80    Physical Exam Constitutional:       General: She is not in acute distress.     Appearance: Normal appearance. She is normal weight. She is not ill-appearing, toxic-appearing or diaphoretic.   HENT:      Head: Normocephalic.      Right Ear: External ear normal.      Left Ear: External ear normal.      Nose: Nose normal.      Mouth/Throat:      Mouth: Mucous membranes are dry.   Eyes:      Extraocular Movements: Extraocular movements intact.      Pupils: Pupils are equal, round, and reactive to light.   Cardiovascular:      Rate and Rhythm: Normal rate and regular rhythm.      Pulses: Normal pulses.   Pulmonary:      Effort: Pulmonary effort is normal.      Breath sounds: Normal breath sounds.   Abdominal:      General: Bowel sounds are normal.      Palpations: Abdomen is soft.   Musculoskeletal:         General: Normal range of motion.      Cervical back: Normal range of motion.      Right lower leg: No edema.      Left lower leg: No edema.   Skin:     General: Skin is warm and dry.       Capillary Refill: Capillary refill takes less than 2 seconds.   Neurological:      General: No focal deficit present.       Motor: No weakness.   Psychiatric:         Mood and Affect: Mood normal.         Behavior: Behavior normal.         Thought Content: Thought content normal.         Judgment: Judgment normal.                 Result Review    Result Review:  I have personally reviewed the results from the time of this admission to 5/13/2023 14:22 EDT and agree with these findings:  []  Laboratory  []  Microbiology  []  Radiology  []  EKG/Telemetry   []  Cardiology/Vascular   []  Pathology  []  Old records  []  Other:  Most notable findings include:         CBC:      Lab 05/13/23  1117 05/12/23  1735   WBC 12.50* 12.00*   HEMOGLOBIN 14.8 15.9   HEMATOCRIT 46.6 48.5*   PLATELETS 311 341   NEUTROS ABS 8.20* 7.60*   LYMPHS ABS 3.20* 3.40*   MONOS ABS 0.80 0.80   EOS ABS 0.20 0.20   MCV 90.0 86.1     CMP:        Lab 05/13/23  1117 05/12/23  1735   SODIUM 139 139   POTASSIUM 3.8 4.2   CHLORIDE 104 100   CO2 21.0* 23.0   ANION GAP 14.0 16.0*   BUN 7 10   CREATININE 0.66 0.77   EGFR 101.8 89.5   GLUCOSE 256* 245*   CALCIUM 9.2 9.9   TOTAL PROTEIN 7.0 7.8   ALBUMIN 4.4 4.7   GLOBULIN 2.6 3.1   ALT (SGPT) 24 28   AST (SGOT) 21 23   BILIRUBIN 0.7 0.6   ALK PHOS 116 128*     No results found for: ACANTHNAEG, AFBCX, BPERTUSSISCX, BLOODCX  No results found for: BCIDPCR, CXREFLEX, CSFCX, CULTURETIS  No results found for: CULTURES, HSVCX, URCX  No results found for: EYECULTURE, GCCX, HSVCULTURE, LABHSV  No results found for: LEGIONELLA, MRSACX, MUMPSCX, MYCOPLASCX  No results found for: NOCARDIACX, STOOLCX  No results found for: THROATCX, UNSTIMCULT, URINECX, CULTURE, VZVCULTUR  No results found for: VIRALCULTU, WOUNDCX      Assessment & Plan      Brief Patient Summary:  Blossom Martin is a 57 y.o. female who       amLODIPine, 5 mg, Oral, Q24H  apixaban, 2.5 mg, Oral, BID  buPROPion XL, 450 mg, Oral, QAM  clopidogrel, 75 mg,  Oral, Daily  insulin glargine, 1-200 Units, Subcutaneous, Nightly - Glucommander  insulin glargine, 20 Units, Subcutaneous, Nightly  insulin lispro, 2-9 Units, Subcutaneous, TID With Meals  isosorbide mononitrate, 60 mg, Oral, Daily  metoprolol tartrate, 100 mg, Oral, BID  sodium chloride, 10 mL, Intravenous, Q12H             Active Hospital Problems:  Active Hospital Problems    Diagnosis    • **Altered mental status    • History of DVT (deep vein thrombosis)    • Hallucinations    • Altered mental status, unspecified altered mental status type    • CAD (coronary artery disease)    • Tobacco abuse    • Diabetes mellitus        Altered mental status with hallucinations resolved now.   -Patient is afebrile with a heart rate within normal limits and is hypertensive with a blood pressure as high as 183/72 during her admission, currently 154/81  -Mild leukocytosis with WBCs of 12.00 with an increased absolute neutrophil lymphocyte count noted on differential,  -INR: Less than 0.93, PT: 25.6  -UA showed no bacteria with 0-2 WBCs, 2+ protein, 1+ ketones, 1+ glucose and a specific gravity of 1.030  -Ethanol and acetaminophen level within normal limits  -Urine drug screen positive for amphetamines, will repeat urine drug screen.  -CT of C-spine shows no acute fracture or osseous malalignment with mild degenerative disease noted  -CT of head without contrast noted some motion limited examination with no gross intracranial abnormality  -We will order MRI of the brain.  -EKG shows no acute changes  -Neurochecks  -May benefit from further imaging or neurology/psychiatry consult based on above findings and clinical course  -Small bolus of LR followed by infusion ordered as patient does appear somewhat dehydrated with close monitoring of blood pressure planned  -Continue telemetry and maintain falls precautions     CAD  -EKG shows sinus rhythm at 72 with some artifact noted no obvious acute changes with a QTc of 428  -Continue  metoprolol, Imdur, Plavix and Eliquis  -Telemetry    Uncontrolled hypertension, restart home meds.    Type 2 diabetes uncontrolled.  Continue insulin add insulin sliding scale, check A1c, endocrine consult for outpatient follow-up.    Nausea, likely gastroparesis, consider Reglan.    Chronic tobacco use, likely COPD.  Advised smoking cessation.    Peripheral  vascular disease status post femoral-popliteal bypass.  Continue anticoagulation.     Hip pain.  X-rays ordered.    Dizziness.  MRI ordered.  Antivert as needed.    +drug screen, ?false positive, Not happy that her urine tested positive for amphetamine. Denies doing amphetamine. repeat.    DVT prophylaxis:  Medical DVT prophylaxis orders are present.    CODE STATUS:    Code Status (Patient has no pulse and is not breathing): CPR (Attempt to Resuscitate)  Medical Interventions (Patient has pulse or is breathing): Full Support      Disposition:  I expect patient to be discharged home.    I have utilized all available, immediate resources to obtain, update, or review the patient's current medications including all prescriptions, over-the-counter products, herbals, cannabis/cannabidiol products, and vitamin.mineral/dietary (nutritional) supplements.      Code Status (Patient has no pulse and is not breathing): CPR (Attempt to Resuscitate)  Medical Interventions (Patient has pulse or is breathing): Full Support    Admission Status:  I believe this patient meets admit status.    Hospital Medicine Quality Measures for Heart Failure:  The patient has a history of heart transplant or LVAD. no    This patient has been examined wearing appropriate Personal Protective Equipment and discussed with rn. 05/13/23      Electronically signed by Robert Fernandez MD, 05/13/23, 14:22 EDT.  [facility] Hospitalist Team

## 2023-05-13 NOTE — NURSING NOTE
This nurse contacted pharmacist about patient medications to be verified. Pharmacist is not able to do it this shift. Will verified in am with  pt's Rx.

## 2023-05-14 VITALS
WEIGHT: 152.56 LBS | SYSTOLIC BLOOD PRESSURE: 103 MMHG | HEART RATE: 74 BPM | HEIGHT: 63 IN | BODY MASS INDEX: 27.03 KG/M2 | TEMPERATURE: 97.5 F | RESPIRATION RATE: 16 BRPM | DIASTOLIC BLOOD PRESSURE: 66 MMHG | OXYGEN SATURATION: 99 %

## 2023-05-14 LAB
ANION GAP SERPL CALCULATED.3IONS-SCNC: 14 MMOL/L (ref 5–15)
BUN SERPL-MCNC: 12 MG/DL (ref 6–20)
BUN/CREAT SERPL: 16.9 (ref 7–25)
CALCIUM SPEC-SCNC: 9.2 MG/DL (ref 8.6–10.5)
CHLORIDE SERPL-SCNC: 107 MMOL/L (ref 98–107)
CO2 SERPL-SCNC: 21 MMOL/L (ref 22–29)
CREAT SERPL-MCNC: 0.71 MG/DL (ref 0.57–1)
D-LACTATE SERPL-SCNC: 1.7 MMOL/L (ref 0.5–2)
EGFRCR SERPLBLD CKD-EPI 2021: 98.7 ML/MIN/1.73
GLUCOSE BLDC GLUCOMTR-MCNC: 202 MG/DL (ref 70–105)
GLUCOSE BLDC GLUCOMTR-MCNC: 210 MG/DL (ref 70–105)
GLUCOSE SERPL-MCNC: 119 MG/DL (ref 65–99)
MAGNESIUM SERPL-MCNC: 2.1 MG/DL (ref 1.6–2.6)
POTASSIUM SERPL-SCNC: 3.6 MMOL/L (ref 3.5–5.2)
SODIUM SERPL-SCNC: 142 MMOL/L (ref 136–145)

## 2023-05-14 PROCEDURE — 25010000002 ONDANSETRON PER 1 MG: Performed by: PHYSICIAN ASSISTANT

## 2023-05-14 PROCEDURE — 63710000001 INSULIN LISPRO (HUMAN) PER 5 UNITS: Performed by: INTERNAL MEDICINE

## 2023-05-14 PROCEDURE — G0378 HOSPITAL OBSERVATION PER HR: HCPCS

## 2023-05-14 PROCEDURE — 96376 TX/PRO/DX INJ SAME DRUG ADON: CPT

## 2023-05-14 PROCEDURE — 82948 REAGENT STRIP/BLOOD GLUCOSE: CPT

## 2023-05-14 PROCEDURE — 99214 OFFICE O/P EST MOD 30 MIN: CPT | Performed by: PSYCHIATRY & NEUROLOGY

## 2023-05-14 RX ORDER — INSULIN ASPART 100 [IU]/ML
7 INJECTION, SOLUTION INTRAVENOUS; SUBCUTANEOUS
Qty: 15 ML | Refills: 12 | Status: SHIPPED | OUTPATIENT
Start: 2023-05-14

## 2023-05-14 RX ORDER — LANCETS
1 EACH MISCELLANEOUS
Qty: 100 EACH | Refills: 2 | Status: SHIPPED | OUTPATIENT
Start: 2023-05-14 | End: 2023-05-14 | Stop reason: SDUPTHER

## 2023-05-14 RX ORDER — ATORVASTATIN CALCIUM 80 MG/1
80 TABLET, FILM COATED ORAL DAILY
COMMUNITY

## 2023-05-14 RX ORDER — LANCETS
1 EACH MISCELLANEOUS
Qty: 100 EACH | Refills: 2 | Status: SHIPPED | OUTPATIENT
Start: 2023-05-14

## 2023-05-14 RX ORDER — ONDANSETRON 4 MG/1
4 TABLET, FILM COATED ORAL EVERY 8 HOURS PRN
Qty: 90 TABLET | Refills: 0 | Status: SHIPPED | OUTPATIENT
Start: 2023-05-14

## 2023-05-14 RX ORDER — UBIQUINOL 100 MG
1 CAPSULE ORAL
Qty: 100 EACH | Refills: 2 | Status: SHIPPED | OUTPATIENT
Start: 2023-05-14

## 2023-05-14 RX ORDER — RISPERIDONE 0.25 MG/1
0.25 TABLET ORAL 2 TIMES DAILY PRN
Qty: 60 TABLET | Refills: 0 | Status: SHIPPED | OUTPATIENT
Start: 2023-05-14

## 2023-05-14 RX ORDER — INSULIN LISPRO 100 [IU]/ML
7 INJECTION, SOLUTION INTRAVENOUS; SUBCUTANEOUS
Qty: 10 ML | Refills: 12 | Status: SHIPPED | OUTPATIENT
Start: 2023-05-14 | End: 2023-05-14 | Stop reason: SDUPTHER

## 2023-05-14 RX ORDER — HYDROCODONE BITARTRATE AND ACETAMINOPHEN 5; 325 MG/1; MG/1
1 TABLET ORAL EVERY 6 HOURS PRN
COMMUNITY

## 2023-05-14 RX ORDER — INSULIN LISPRO 100 [IU]/ML
2-9 INJECTION, SOLUTION INTRAVENOUS; SUBCUTANEOUS
Qty: 10 ML | Refills: 12 | Status: SHIPPED | OUTPATIENT
Start: 2023-05-14

## 2023-05-14 RX ORDER — HYDROXYZINE 50 MG/1
100 TABLET, FILM COATED ORAL NIGHTLY
Qty: 30 TABLET | Refills: 0
Start: 2023-05-14

## 2023-05-14 RX ORDER — BLOOD-GLUCOSE METER
1 EACH MISCELLANEOUS
Qty: 1 KIT | Refills: 0 | Status: SHIPPED | OUTPATIENT
Start: 2023-05-14 | End: 2023-05-14 | Stop reason: SDUPTHER

## 2023-05-14 RX ORDER — BLOOD-GLUCOSE METER
1 EACH MISCELLANEOUS
Qty: 1 KIT | Refills: 0 | Status: SHIPPED | OUTPATIENT
Start: 2023-05-14

## 2023-05-14 RX ORDER — METOCLOPRAMIDE 5 MG/1
5 TABLET ORAL
Qty: 90 TABLET | Refills: 0 | Status: SHIPPED | OUTPATIENT
Start: 2023-05-14

## 2023-05-14 RX ORDER — INSULIN LISPRO 100 [IU]/ML
2-9 INJECTION, SOLUTION INTRAVENOUS; SUBCUTANEOUS
Qty: 10 ML | Refills: 12 | Status: SHIPPED | OUTPATIENT
Start: 2023-05-14 | End: 2023-05-14 | Stop reason: SDUPTHER

## 2023-05-14 RX ADMIN — CLOPIDOGREL BISULFATE 75 MG: 75 TABLET ORAL at 09:58

## 2023-05-14 RX ADMIN — ONDANSETRON 4 MG: 2 INJECTION INTRAMUSCULAR; INTRAVENOUS at 12:11

## 2023-05-14 RX ADMIN — Medication 10 ML: at 10:06

## 2023-05-14 RX ADMIN — BUPROPION HYDROCHLORIDE 450 MG: 150 TABLET, EXTENDED RELEASE ORAL at 06:52

## 2023-05-14 RX ADMIN — INSULIN LISPRO 4 UNITS: 100 INJECTION, SOLUTION INTRAVENOUS; SUBCUTANEOUS at 10:03

## 2023-05-14 RX ADMIN — APIXABAN 2.5 MG: 2.5 TABLET, FILM COATED ORAL at 09:58

## 2023-05-14 RX ADMIN — INSULIN LISPRO 7 UNITS: 100 INJECTION, SOLUTION INTRAVENOUS; SUBCUTANEOUS at 10:17

## 2023-05-14 NOTE — DISCHARGE SUMMARY
[facility] Medicine Services  DISCHARGE SUMMARY    Patient Name: Blossom Martin  : 1965  MRN: [id number]    Date of Admission: 2023  Discharge Diagnosis: Altered mental status  Date of Discharge:  23  Primary Care Physician: Collin Low APRN      Presenting Problem:   Altered mental status, unspecified altered mental status type [R41.82]    Active and Resolved Hospital Problems:  Active Hospital Problems    Diagnosis POA   • **Altered mental status [R41.82] Yes   • History of DVT (deep vein thrombosis) [Z86.718] Not Applicable   • Hallucinations [R44.3] Unknown   • Altered mental status, unspecified altered mental status type [R41.82] Yes   • CAD (coronary artery disease) [I25.10] Yes   • Tobacco abuse [Z72.0] Yes   • Diabetes mellitus [E11.9] Yes      Resolved Hospital Problems   No resolved problems to display.     Altered mental status with hallucinations resolved now.   -Patient is afebrile with a heart rate within normal limits and is hypertensive with a blood pressure as high as 183/72 during her admission, currently 154/81  -Mild leukocytosis with WBCs of 12.00 with an increased absolute neutrophil lymphocyte count noted on differential, normal procalcitonin  -INR: Less than 0.93, PT: 25.6  -UA showed no bacteria with 0-2 WBCs, 2+ protein, 1+ ketones, 1+ glucose and a specific gravity of 1.030  -Ethanol and acetaminophen level within normal limits  -Urine drug screen neg.  -CT of C-spine shows no acute fracture or osseous malalignment with mild degenerative disease noted  -CT of head without contrast noted some motion limited examination with no gross intracranial abnormality  - MRI of the brain.  No CVA  -EKG shows no acute changes  -psychiatry consulted  -Small bolus of LR followed by infusion ordered as patient does appear somewhat dehydrated with close monitoring of blood pressure planned     CAD  -EKG shows sinus rhythm at 72 with some artifact  "noted no obvious acute changes with a QTc of 428  -Continue metoprolol, Imdur, Plavix and Eliquis  -Telemetry     Uncontrolled hypertension, restart home meds.     Type 2 diabetes uncontrolled.  Continue insulin add insulin sliding scale, check A1c, endocrine consult for outpatient follow-up.     Nausea, likely gastroparesis, consider Reglan.     Chronic tobacco use, likely COPD.  Advised smoking cessation.     Peripheral  vascular disease status post femoral-popliteal bypass.  Continue anticoagulation.     Hip pain.  X-rays ordered.     Dizziness.  MRI ordered.  Antivert as needed.     +drug screen, likely false positive, Not happy that her urine tested positive for amphetamine. Denies doing amphetamine. repeat: negative     Human Rhinovirus/Enterovirus positive.  Hospital Course     Hospital Course:  Blossom Martin is a 57 y.o. female who presented to [facility] on [facility]/12/[facility] with an altered mental status.  Family is not present at time my exam but ED provider notes that they reported patient had been acting unusual and seem to be having some hallucinations over the past several days which became worse on the day of presentation.  Multiple falls were also reported.  At the time of my exam patient reports that she has been having some nausea and vomiting complaining of decreased appetite and significant polydipsia.  She is uncertain if she has had increased urine output or any other symptoms of dysuria.  Patient is alert and oriented to place at the time of my exam and when asked her name she states \"everyone has been calling me Blossom\".  She is not oriented to month or year.  Patient denies any focal neurologic deficits and none were noted on exam.  She does not appear anxious, agitated or irritated throughout the exam.  She denies any previous methamphetamine use     5/13/2023 patient seen and examined in bed no acute distress, mental status improved.  Patient is very upset.Not happy that her urine " tested positive for amphetamine. Denies doing amphetamine.   We were asked by nurse to see this patient.  Complaining of right hip pain.  Order x-ray of the right hip.  Patient also complains of visual hallucinations, psychiatry consulted.  Discussed with RN.  MRI of the brain ordered.  Neurology consulted.  Medications reconciled.  Insulin sliding scale added.  Blood cultures obtained.  Repeat blood work done.  Laboratory test reviewed.  Long discussion with patient, family members, and nurse for over 45 minutes.  Apparently patient has been having difficulty with her insurance.  Says she is not wanted to take so many medicines.  Had a recent fall.  Traumatized the right hip.  Did not take the blood pressure medicines yesterday or today.  Restarted.     5/14/2023 patient seen and examined in bed no acute distress, doing better today, she is to go home Home vital signs stable, discussed with neurology.  Discussed with RN.  Discharge patient home, condition at discharge stable.  Discussed with family members.    DISCHARGE Follow Up Recommendations for labs and diagnostics: Monitor CBC  follow with pcp in one week  Follow-up with endocrinology in 2 weeks.  Follow-up with psychiatrist in 2 weeks    Reasons For Change In Medications and Indications for New Medications:      Day of Discharge     Vital Signs:  Temp:  [97.4 °F (36.3 °C)-98.5 °F (36.9 °C)] 97.5 °F (36.4 °C)  Heart Rate:  [67-96] 74  Resp:  [10-18] 16  BP: ()/(58-80) 103/66      Physical Exam *Constitutional:       General: She is not in acute distress.     Appearance: Normal appearance. She is normal weight. She is not ill-appearing, toxic-appearing or diaphoretic.   HENT:      Head: Normocephalic.      Right Ear: External ear normal.      Left Ear: External ear normal.      Nose: Nose normal.      Mouth/Throat:      Mouth: Mucous membranes are dry.   Eyes:      Extraocular Movements: Extraocular movements intact.      Pupils: Pupils are equal, round,  and reactive to light.   Cardiovascular:      Rate and Rhythm: Normal rate and regular rhythm.      Pulses: Normal pulses.   Pulmonary:      Effort: Pulmonary effort is normal.      Breath sounds: Normal breath sounds.   Abdominal:      General: Bowel sounds are normal.      Palpations: Abdomen is soft.   Musculoskeletal:         General: Normal range of motion.      Cervical back: Normal range of motion.      Right lower leg: No edema.      Left lower leg: No edema.   Skin:     General: Skin is warm and dry.      Capillary Refill: Capillary refill takes less than 2 seconds.   Neurological:      General: No focal deficit present.       Motor: No weakness.   Psychiatric:         Mood and Affect: Mood normal.         Behavior: Behavior normal.         Thought Content: Thought content normal.         Judgment: Judgment normal.     Pertinent  and/or Most Recent Results     LAB RESULTS:      Lab 05/13/23 2250 05/13/23 1905 05/13/23  1542 05/13/23 1117 05/12/23 1937 05/12/23  1735   WBC 12.30*  --   --  12.50*  --  12.00*   HEMOGLOBIN 13.0  --   --  14.8  --  15.9   HEMATOCRIT 40.4  --   --  46.6  --  48.5*   PLATELETS 290  --   --  311  --  341   NEUTROS ABS 6.90  --   --  8.20*  --  7.60*   LYMPHS ABS 4.00*  --   --  3.20*  --  3.40*   MONOS ABS 1.00*  --   --  0.80  --  0.80   EOS ABS 0.20  --   --  0.20  --  0.20   MCV 87.6  --   --  90.0  --  86.1   PROCALCITONIN  --   --   --  0.07 0.06  --    LACTATE 1.7 2.5* 2.6* 2.3*  --   --    PROTIME  --   --   --   --   --  9.9   APTT  --   --   --   --   --  25.6*         Lab 05/13/23 2250 05/13/23 1117 05/12/23 1937 05/12/23  1735   SODIUM 142 139  --  139   POTASSIUM 3.6 3.8  --  4.2   CHLORIDE 107 104  --  100   CO2 21.0* 21.0*  --  23.0   ANION GAP 14.0 14.0  --  16.0*   BUN 12 7  --  10   CREATININE 0.71 0.66  --  0.77   EGFR 98.7 101.8  --  89.5   GLUCOSE 119* 256*  --  245*   CALCIUM 9.2 9.2  --  9.9   MAGNESIUM 2.1  --   --   --    HEMOGLOBIN A1C  --   --   --   9.10*   TSH  --   --  2.170  --          Lab 05/13/23  1117 05/12/23  1735   TOTAL PROTEIN 7.0 7.8   ALBUMIN 4.4 4.7   GLOBULIN 2.6 3.1   ALT (SGPT) 24 28   AST (SGOT) 21 23   BILIRUBIN 0.7 0.6   ALK PHOS 116 128*         Lab 05/13/23  1117 05/12/23  1937 05/12/23  1735   PROBNP 154.0  --   --    HSTROP T  --  8 9   PROTIME  --   --  9.9   INR  --   --  <0.93*                 Brief Urine Lab Results  (Last result in the past 365 days)      Color   Clarity   Blood   Leuk Est   Nitrite   Protein   CREAT   Urine HCG        05/12/23 1815 Dark Yellow   Clear   Negative   Negative   Negative   100 mg/dL (2+)               Microbiology Results (last 10 days)     Procedure Component Value - Date/Time    Respiratory Panel PCR w/COVID-19(SARS-CoV-2) LIS/BENITO/PATRICE/PAD/COR/MAD/MIRIAM In-House, NP Swab in UTM/VTM, 3-4 HR TAT - Swab, Nasopharynx [[id number]]  (Abnormal) Collected: 05/13/23 1339    Lab Status: Final result Specimen: Swab from Nasopharynx Updated: 05/13/23 1643     ADENOVIRUS, PCR Not Detected     Coronavirus 229E Not Detected     Coronavirus HKU1 Not Detected     Coronavirus NL63 Not Detected     Coronavirus OC43 Not Detected     COVID19 Not Detected     Human Metapneumovirus Not Detected     Human Rhinovirus/Enterovirus Detected     Influenza A PCR Not Detected     Influenza B PCR Not Detected     Parainfluenza Virus 1 Not Detected     Parainfluenza Virus 2 Not Detected     Parainfluenza Virus 3 Not Detected     Parainfluenza Virus 4 Not Detected     RSV, PCR Not Detected     Bordetella pertussis pcr Not Detected     Bordetella parapertussis PCR Not Detected     Chlamydophila pneumoniae PCR Not Detected     Mycoplasma pneumo by PCR Not Detected    Narrative:      In the setting of a positive respiratory panel with a viral infection PLUS a negative procalcitonin without other underlying concern for bacterial infection, consider observing off antibiotics or discontinuation of antibiotics and continue supportive care. If  the respiratory panel is positive for atypical bacterial infection (Bordetella pertussis, Chlamydophila pneumoniae, or Mycoplasma pneumoniae), consider antibiotic de-escalation to target atypical bacterial infection.          CT Head Without Contrast    Result Date: 5/12/2023  Impression: Impression: Motion limited examination. 1. No gross acute intracranial abnormality. If high clinical concern, follow-up imaging would be recommended after clinical improvement. Electronically Signed: Howard Cruz  5/12/2023 6:19 PM EDT  Workstation ID: [id number]    CT Cervical Spine Without Contrast    Result Date: 5/12/2023  Impression: Impression: 1. No acute fracture or osseous malalignment. 2. Mild degenerative disc disease of the cervical spine with anterior endplate osteophyte formation at C4-C5, C5-C6, and C6-C7. Electronically Signed: Howard Cruz  5/12/2023 6:29 PM EDT  Workstation ID: [id number]    MRI Brain Without Contrast    Result Date: 5/13/2023  Impression: Impression: 1. No acute intracranial abnormality. 2. Mild chronic small vessel ischemic change. 3. Mild paranasal sinus mucosal disease. 4. 9 mm osteoma versus ossified meningioma at the left frontal calvarium inner table. Electronically Signed: Arsenio Abraham  5/13/2023 2:48 PM EDT  Workstation ID: [id number]    XR Chest 1 View    Result Date: 5/13/2023  Impression: Impression: No acute cardiopulmonary abnormality. Electronically Signed: Arsenio Abraham  5/13/2023 1:15 PM EDT  Workstation ID: [id number]    CT Angiogram Chest Pulmonary Embolism    Result Date: 4/14/2023  Impression: Impression: 1. No evidence of pulmonary embolus. 2. 2 cm low-density nodule in the left adrenal may be due to an adenoma. Electronically Signed: Tejas Frye  4/14/2023 2:27 PM EDT  Workstation ID: [id number]    XR Pelvis 1 or 2 View    Result Date: 5/13/2023  Impression: Impression: Osteoarthritis without acute osseous abnormality. Electronically Signed: Arsenio Abraham  5/13/2023 1:17 PM EDT   Workstation ID: [id number]      Results for orders placed during the hospital encounter of 10/27/22    Duplex aorta ivc iliac graft complete CAR    Interpretation Summary  •  Patent common iliac artery stents and left external iliac artery stent without stenosis.  Aorta patent without stenosis or aneurysmal changes.      Results for orders placed during the hospital encounter of 10/27/22    Duplex aorta ivc iliac graft complete CAR    Interpretation Summary  •  Patent common iliac artery stents and left external iliac artery stent without stenosis.  Aorta patent without stenosis or aneurysmal changes.          Labs Pending at Discharge:  Pending Labs     Order Current Status    Blood Culture - Blood, Hand, Left In process    Blood Culture - Blood, Hand, Right In process          Procedures Performed           Consults:   Consults     Date and Time Order Name Status Description    5/13/2023 12:37 PM Inpatient Endocrinology Consult      5/13/2023 10:49 AM Inpatient Neurology Consult General      5/13/2023 10:49 AM Inpatient Psychiatrist Consult Completed     5/12/2023  7:22 PM Hospitalist (on-call MD unless specified)          ASSESSMENT/PLAN:  Mental status changes, very nonspecific  She is doing fine right now     It could be multifactorial, including lack of sleep, psych issues, medication related  Infection     She is doing fine right now and there is nothing active neurologic going on     MRI brain was okay for conditions like no stroke or other issues, there is question about a meningioma or osteoma which needs to be followed up before we go to biopsy etc.     If there are further events then further evaluation can be done     Nothing suggesting seizure, stroke or CNS infection     Dr. Fernandez was present when I was evaluating her and I concur that she may be discharged to follow-up with neurology as outpatient if needed or return if there are further issues        I discussed the patient's findings and my  recommendations with patient, nursing staff and primary care team     Haile Tapia MD  05/14/23      Assessment: Major depressive d/o in remission   Delirium due to medical condition (TME)     Treatment Plan: the pt presented with transient confusional episodes, she has underlying depression that was well controlled on current meds  Cont Bupropion  mg po QAM  Restart duloxetine 40 mg po QHS when BP gets stable, it was dc due to elevated /87  Based on acuity of mental status changes, her sxs are most likely due to medical condition since medication changes already ruled out   UDS was + for meth but it might be false positive for wellbutrin   The pt is getting MRI  Cont to provide support  Start low dose of risperidone 0.25 mg po BID PRN for hallucinations, severe agitation   The pt can be d/c when medically stable   Treatment Plan discussed with: Patient, Family and nursing      I discussed the patients findings and my recommendations with patient, family and nursing staff     I have reviewed and approved the behavioral health treatment plans and problem list. Yes  Thank you for the consult   Referring MD has access to consult report and progress notes in EMR         This document has been electronically signed by Aarti Anderson MD  May 13, 2023 12:03 EDT     Part of this note may be an electronic transcription/translation of spoken language to printed text using the Dragon Dictation System  Discharge Details        Discharge Medications      Continue These Medications      Instructions Start Date   Accu-Chek Guide Me w/Device kit   use 3 times daily before meals      Accu-Chek Softclix Lancets lancets   1 each, Other, 3 Times Daily Before Meals, 1 each by Other route 3 (Three) Times a Day Before Meals. Use as instructed. Dx code: E11.65      Alcohol Prep 70 % pads   1 each, Apply externally, 3 Times Daily Before Meals      Insulin Pen Needle 32G X 4 MM misc   use daily with insulin         ASK  your doctor about these medications      Instructions Start Date   amLODIPine 5 MG tablet  Commonly known as: NORVASC   5 mg, Oral, Every 24 Hours Scheduled      apixaban 2.5 MG tablet tablet  Commonly known as: ELIQUIS  Ask about: Which instructions should I use?   2.5 mg, Oral, 2 Times Daily      atorvastatin 80 MG tablet  Commonly known as: LIPITOR   80 mg, Oral, Daily      buPROPion  MG 24 hr tablet  Commonly known as: FORFIVO XL   450 mg, Oral, Every Morning      clopidogrel 75 MG tablet  Commonly known as: PLAVIX   75 mg, Oral, Daily      DULoxetine 20 MG capsule  Commonly known as: CYMBALTA   40 mg, Oral, Daily      glimepiride 2 MG tablet  Commonly known as: AMARYL   2 mg, Oral, Daily      glucose blood test strip  Commonly known as: Accu-Chek Guide   1 each, Other, 3 Times Daily Before Meals, 1 each by Other route 3 (Three) Times a Day Before Meals. Dx code: E11.65      HYDROcodone-acetaminophen 5-325 MG per tablet  Commonly known as: NORCO   1 tablet, Oral, Every 6 Hours PRN      hydrOXYzine 50 MG tablet  Commonly known as: ATARAX   100 mg, Oral, Nightly      insulin detemir 100 UNIT/ML injection  Commonly known as: LEVEMIR   Inject 20 Units under the skin into the appropriate area as directed Daily      isosorbide mononitrate 60 MG 24 hr tablet  Commonly known as: IMDUR   60 mg, Oral, Daily      metoprolol tartrate 100 MG tablet  Commonly known as: LOPRESSOR   100 mg, Oral, 2 Times Daily      SITagliptin 100 MG tablet  Commonly known as: JANUVIA   100 mg, Oral, Daily      Vitamin D3 1.25 MG (35439 UT) capsule   50,000 Units, Oral, Every 7 Days, Wednesdays             Allergies   Allergen Reactions   • Aspirin GI Intolerance and Other (See Comments)     Nausea and vomiting      • Hydrocodone Itching         Discharge Disposition:       Diet:  Hospital:  Diet Order   Procedures   • Diet: Cardiac Diets, Diabetic Diets; Healthy Heart (2-3 Na+); Consistent Carbohydrate; Texture: Regular Texture (IDDSI  7); Fluid Consistency: Thin (IDDSI 0)         Discharge Activity:         CODE STATUS:  Code Status and Medical Interventions:   Ordered at: 05/12/23 2014     Code Status (Patient has no pulse and is not breathing):    CPR (Attempt to Resuscitate)     Medical Interventions (Patient has pulse or is breathing):    Full Support     I have utilized all available, immediate resources to obtain, update, or review the patient's current medications including all prescriptions, over-the-counter products, herbals, cannabis/cannabidiol products, and vitamin.mineral/dietary (nutritional) supplements.      Code Status (Patient has no pulse and is not breathing): CPR (Attempt to Resuscitate)  Medical Interventions (Patient has pulse or is breathing): Full Support        Admission Status:  I believe this patient meets admit status.          No future appointments.        Time spent on Discharge including face to face service:  34 minutes    This patient has been examined wearing appropriate Personal Protective Equipment and discussed with rn. 05/14/23      Signature: Electronically signed by Robert Fernandez MD, 05/14/23, 4:49 PM EDT.

## 2023-05-14 NOTE — PLAN OF CARE
Goal Outcome Evaluation:  Plan of Care Reviewed With: patient        Progress: improving  Outcome Evaluation: Patient 58 yo female admitted to the hospital with the complaint of Altered mental status. Recent Dx includes Hallucinations, AMS, History of DVT , CAD , Diabetes mellitus,Tobacco abuse. At baseline, patient resides with daughter in a house and is independent in mobility and ADLs without using AD. As per today's evaluation, patient is Ind in Bed Mobility, transfers and ambulating 30ft. Patient has 3+/5 BLE strength. PT recommend HHPT at d/c.

## 2023-05-14 NOTE — THERAPY EVALUATION
Patient Name: Blossom Martin  : 1965    MRN: [id number]                              Today's Date: 2023       Admit Date: 2023    Visit Dx:     ICD-10-CM ICD-9-CM   1. Altered mental status, unspecified altered mental status type  R41.82 780.97     Patient Active Problem List   Diagnosis   • Arterial stent thrombosis, initial encounter   • Right leg pain   • Diabetes mellitus   • Hyperlipidemia   • Tobacco abuse   • CAD (coronary artery disease)   • Hypertension   • Anxiety disorder   • History of DVT (deep vein thrombosis)   • Altered mental status   • Hallucinations   • Altered mental status, unspecified altered mental status type     Past Medical History:   Diagnosis Date   • CAD (coronary artery disease)    • Diabetes    • DVT (deep venous thrombosis)    • Hypertension      Past Surgical History:   Procedure Laterality Date   • ANGIOPLASTY  2022    failed peripheral angioplasty by Dr Kurtz   • ANGIOPLASTY ILIAC ARTERY Bilateral 2022    Procedure: BILATERAL COMMON ILIAC ARTERY ANGIOPLASTY AND STENT PLACEMENT, RIGHT EXTERNAL ILIAC ARTERY ANGIOPLASTY AND STENT, RIGHT EXTERNAL ILIAC ARTERY DRUG COATED BALLOON ANGIOPLASTY AND RIGHT LOWER EXTREMITY ANGIOGRAM;  Surgeon: Shirley Kurtz MD;  Location: [facility] MAIN OR;  Service: Vascular;  Laterality: Bilateral;   • CARDIAC CATHETERIZATION N/A 2022    Procedure: Thrombolysis-peripheral;  Surgeon: Shirley Kurtz MD;  Location: [facility] CATH INVASIVE LOCATION;  Service: Cardiovascular;  Laterality: N/A;   • [place] [facility] HE[place] CATH        General Information     Row Name 23          Physical Therapy Time and Intention    Document Type evaluation  -PG     Mode of Treatment physical therapy  -PG     Row Name 23          General Information    Patient Profile Reviewed yes  -PG     Prior Level of Function independent:  -PG     Existing Precautions/Restrictions fall   -PG     Barriers to Rehab none identified  -PG     Row Name 05/13/23 2031          Living Environment    People in Home child(tramaine), adult;grandchild(tramaine)  -PG     Row Name 05/13/23 2031          Home Main Entrance    Number of Stairs, Main Entrance four  -PG     Stair Railings, Main Entrance railings safe and in good condition  -PG     Row Name 05/13/23 2031          Stairs Within Home, Primary    Number of Stairs, Within Home, Primary other (see comments)  20  -PG     Row Name 05/13/23 2031          Cognition    Orientation Status (Cognition) oriented x 4  -PG     Row Name 05/13/23 2031          Safety Issues, Functional Mobility    Safety Issues Affecting Function (Mobility) safety precaution awareness  -PG     Impairments Affecting Function (Mobility) balance;postural/trunk control;endurance/activity tolerance  -PG           User Key  (r) = Recorded By, (t) = Taken By, (c) = Cosigned By    Initials Name Provider Type    PG Emily Angelo PT Physical Therapist               Mobility     Row Name 05/13/23 2033          Bed Mobility    Bed Mobility bed mobility (all) activities  -PG     All Activities, [place] (Bed Mobility) independent  -PG     Row Name 05/13/23 2033          Sit-Stand Transfer    Sit-Stand [place] (Transfers) independent  -PG     Row Name 05/13/23 2033          Gait/Stairs (Locomotion)    [place] Level (Gait) independent  -PG           User Key  (r) = Recorded By, (t) = Taken By, (c) = Cosigned By    Initials Name Provider Type    Emily Olivo PT Physical Therapist               Obj/Interventions     Row Name 05/13/23 2033          Range of Motion Comprehensive    General Range of Motion bilateral upper extremity ROM WFL;bilateral lower extremity ROM WFL  -PG     Row Name 05/13/23 2033          Strength Comprehensive (MMT)    General Manual Muscle Testing (MMT) Assessment lower extremity strength deficits identified  -PG     Comment, General Manual Muscle Testing (MMT)  Assessment BLE 3+/5  -PG     Row Name 05/13/23 2033          Balance    Balance Assessment sitting static balance;sit to stand dynamic balance;sitting dynamic balance;standing dynamic balance  -PG     Static Sitting Balance independent  -PG     Dynamic Sitting Balance independent  -PG     Sit to Stand Dynamic Balance independent  -PG     Dynamic Standing Balance independent  -PG           User Key  (r) = Recorded By, (t) = Taken By, (c) = Cosigned By    Initials Name Provider Type    Emily Olivo, PT Physical Therapist               Goals/Plan    No documentation.                Clinical Impression     Row Name 05/13/23 2034          Pain    Pretreatment Pain Rating 0/10 - no pain  -PG     Posttreatment Pain Rating 0/10 - no pain  -PG     Row Name 05/13/23 2034          Plan of Care Review    Plan of Care Reviewed With patient  -PG     Progress improving  -PG     Outcome Evaluation Patient 58 yo female admitted to the hospital with the complaint of Altered mental status. Recent Dx includes Hallucinations, AMS, History of DVT , CAD , Diabetes mellitus,Tobacco abuse. At baseline, patient resides with daughter in a house and is independent in mobility and ADLs without using AD. As per today's evaluation, patient is Ind in Bed Mobility, transfers and ambulating 30ft. Patient has 3+/5 BLE strength. PT recommend HHPT at d/c.  -PG     Row Name 05/13/23 2034          Therapy Assessment/Plan (PT)    Criteria for Skilled Interventions Met (PT) no;no problems identified which require skilled intervention  -PG     Therapy Frequency (PT) evaluation only  -PG     Row Name 05/13/23 2034          Positioning and Restraints    Pre-Treatment Position in bed  -PG     Post Treatment Position bed  -PG     In Bed notified nsg;call light within reach;encouraged to call for assist;sitting EOB  -PG           User Key  (r) = Recorded By, (t) = Taken By, (c) = Cosigned By    Initials Name Provider Type    Emily Olivo, PT Physical  Therapist               Outcome Measures     Row Name 05/13/23 2038 05/13/23 1951       How much help from another person do you currently need...    Turning from your back to your side while in flat bed without using bedrails? 4  -PG 4  -ER    Moving from lying on back to sitting on the side of a flat bed without bedrails? 4  -PG 4  -ER    Moving to and from a bed to a chair (including a wheelchair)? 4  -PG 4  -ER    Standing up from a chair using your arms (e.g., wheelchair, bedside chair)? 4  -PG 4  -ER    Climbing 3-5 steps with a railing? 4  -PG 3  -ER    To walk in hospital room? 4  -PG 4  -ER    AM-PAC 6 Clicks Score (PT) 24  -PG 23  -ER    Highest level of mobility 8 --> Walked 250 feet or more  -PG 7 --> Walked 25 feet or more  -ER    Row Name 05/13/23 0851          How much help from another person do you currently need...    Turning from your back to your side while in flat bed without using bedrails? 4  -KA     Moving from lying on back to sitting on the side of a flat bed without bedrails? 4  -KA     Moving to and from a bed to a chair (including a wheelchair)? 4  -KA     Standing up from a chair using your arms (e.g., wheelchair, bedside chair)? 4  -KA     Climbing 3-5 steps with a railing? 4  -KA     To walk in hospital room? 4  -KA     AM-PAC 6 Clicks Score (PT) 24  -KA     Highest level of mobility 8 --> Walked 250 feet or more  -KA     Row Name 05/13/23 2038          Functional Assessment    Outcome Measure Options AM-PAC 6 Clicks Basic Mobility (PT)  -PG           User Key  (r) = Recorded By, (t) = Taken By, (c) = Cosigned By    Initials Name Provider Type    ER Ruba Hazel, RN Registered Nurse    Glenda Deutsch LPN Licensed Nurse    Emily Olivo, PT Physical Therapist                             Physical Therapy Education     Title: PT OT SLP Therapies (Done)     Topic: Physical Therapy (Done)     Point: Precautions (Done)     Learning Progress Summary           Patient  Acceptance, E, VU by PG at 5/13/2023 2038                               User Key     Initials Effective Dates Name Provider Type Discipline    PG 09/01/22 -  Emily Angelo PT Physical Therapist PT              PT Recommendation and Plan     Plan of Care Reviewed With: patient  Progress: improving  Outcome Evaluation: Patient 56 yo female admitted to the hospital with the complaint of Altered mental status. Recent Dx includes Hallucinations, AMS, History of DVT , CAD , Diabetes mellitus,Tobacco abuse. At baseline, patient resides with daughter in a house and is independent in mobility and ADLs without using AD. As per today's evaluation, patient is Ind in Bed Mobility, transfers and ambulating 30ft. Patient has 3+/5 BLE strength. PT recommend HHPT at d/c.     Time Calculation:    PT Charges     Row Name 05/13/23 2039             Time Calculation    Start Time 1612  -PG      Stop Time 1622  -PG      Time Calculation (min) 10 min  -PG      PT Received On 05/13/23  -PG         Time Calculation- PT    Total Timed Code Minutes- PT 0 minute(s)  -PG            User Key  (r) = Recorded By, (t) = Taken By, (c) = Cosigned By    Initials Name Provider Type    PG Emily Angelo, KAUSHAL Physical Therapist              Therapy Charges for Today     Code Description Service Date Service Provider Modifiers Qty    [id number] HC PT EVAL LOW COMPLEXITY 3 5/13/2023 Emily Angelo PT GP 1          PT G-Codes  Outcome Measure Options: AM-PAC 6 Clicks Basic Mobility (PT)  AM-PAC 6 Clicks Score (PT): 24  PT Discharge Summary  Anticipated Discharge Disposition (PT): home with assist, home with home health, home with 24/7 care    Emily Angelo PT  5/13/2023

## 2023-05-14 NOTE — PLAN OF CARE
Goal Outcome Evaluation:         Pt resting this shift, pt denies any pain, no signs of delirium noted. Pt had a dry heaving episode, prn medication given and effective. Pt cleared by Hospitalist, Psych, Endocrinology and Neurology. Pt to d/c home, family will .

## 2023-05-14 NOTE — CONSULTS
"Primary Care Provider: No ref. provider found     Consult requested by: Dr. Fernandez  Reason for Consultation: Neurological evaluation /mental status changes  History taken from: patient chart RN Dr. Fernandez    Chief complaint: Mental status changes       SUBJECTIVE:    History of present illness: Background per H&P: Blossom Martin is a 57 y.o. year old female who was evaluated in room 342 at [facility]    Source of information is the patient and my discussion with Dr. Fernandez    She was admitted for mental status changes, but when I saw her today around noon she is awake alert and oriented and wants to go home    Her work-up has been unremarkable except initially there was some blood pressure issues and also her hemoglobin A1c is elevated    There was concern about amphetamine use but likely this was false positive due to prescribed medication    He had MRI brain done which demonstrated,  \"  1. No acute intracranial abnormality.   2. Mild chronic small vessel ischemic change.   3. Mild paranasal sinus mucosal disease.   4. 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      Talk to Dr. Fernandez and advised that in the discharge summary it be mentioned that she should have another MRI without and with contrast in about 4 to 6 weeks and then follow-up as indicated    Nothing suggesting seizures  Nothing suggesting CNS infection    She reports that she was taking care of her 3-year-old and not sleeping well and had other issues that may have contributed, including a possibility of a viral infection          As per admitting  Blossom Martin is a 57 y.o. female who presented to [facility] on 5/12/2023 with an altered mental status.  Family is not present at time my exam but ED provider notes that they reported patient had been acting unusual and seem to be having some hallucinations over the past several days which became worse on the day of presentation.  " "Multiple falls were also reported.  At the time of my exam patient reports that she has been having some nausea and vomiting complaining of decreased appetite and significant polydipsia.  She is uncertain if she has had increased urine output or any other symptoms of dysuria.  Patient is alert and oriented to place at the time of my exam and when asked her name she states \"everyone has been calling me Blossom\".  She is not oriented to month or year.  Patient denies any focal neurologic deficits and none were noted on exam.  She does not appear anxious, agitated or irritated throughout the exam.  She denies any previous methamphetamine use      - Portions of the above HPI were copied from previous encounters and edited as appropriate. PMH as detailed below.     Review of Systems   No fever chills rigors or sweats  No weight issues  No sleep problems  HEENT:  No speech problem, vision changes, facial asymmetry or pain, or neck problem  Chest: No chest pain, clubbing, cyanosis, orthopnea palpitations  Pulmonary:  No shortness of air, cough or expectoration  Abdomen:  No swelling/tension, constipation,diarrhea or pain  No genitourinary symptoms  Extremity problems as discussed  No back problem  No psychotic issues  Neurologic issues as discussed  No hematologic, dermatologic or endocrine problems, she is diabetic        PATIENT HISTORY:  Past Medical History:   Diagnosis Date   • CAD (coronary artery disease)    • Diabetes    • DVT (deep venous thrombosis)    • Hypertension    ,   Past Surgical History:   Procedure Laterality Date   • ANGIOPLASTY  09/08/2022    failed peripheral angioplasty by Dr Kurtz   • ANGIOPLASTY ILIAC ARTERY Bilateral 9/9/2022    Procedure: BILATERAL COMMON ILIAC ARTERY ANGIOPLASTY AND STENT PLACEMENT, RIGHT EXTERNAL ILIAC ARTERY ANGIOPLASTY AND STENT, RIGHT EXTERNAL ILIAC ARTERY DRUG COATED BALLOON ANGIOPLASTY AND RIGHT LOWER EXTREMITY ANGIOGRAM;  Surgeon: Shirley Kurtz MD;  [facility]" [facility] MAIN OR;  Service: Vascular;  Laterality: Bilateral;   • CARDIAC CATHETERIZATION N/A 9/8/2022    Procedure: Thrombolysis-peripheral;  Surgeon: Shirley Kurtz MD;  Location: [facility] CATH INVASIVE LOCATION;  Service: Cardiovascular;  Laterality: N/A;   • ILIAC ARTERY STENT Bilateral     At [facility]   • LEFT HEART CATH     ,   Family History   Problem Relation Age of Onset   • No Known Problems Mother    • No Known Problems Father    ,   Social History     Tobacco Use   • Smoking status: Never   • Smokeless tobacco: Never   Vaping Use   • Vaping Use: Never used   Substance Use Topics   • Alcohol use: Never   • Drug use: Never   ,   Prior to Admission medications    Medication Sig Start Date End Date Taking? Authorizing Provider   Accu-Chek Softclix Lancets lancets 1 each by Other route 3 (Three) Times a Day Before Meals. 1 each by Other route 3 (Three) Times a Day Before Meals. Use as instructed. Dx code: E11.65 5/14/23  Yes Robert Fernandez MD   Alcohol Swabs (Alcohol Prep) 70 % pads Apply 1 each topically 3 (Three) Times a Day Before Meals. 5/14/23  Yes Robert Fernandez MD   Blood Glucose Monitoring Suppl (Accu-Chek Guide Me) w/Device kit use 3 times daily before meals 5/14/23  Yes Robert Fernandez MD   buPROPion XL (FORFIVO XL) 450 MG 24 hr tablet Take 1 tablet by mouth Every Morning.   Yes Provider, MD Kimbrely   insulin lispro (HUMALOG/ADMELOG) 100 UNIT/ML injection Inject 7 Units under the skin into the appropriate area as directed 3 (Three) Times a Day With Meals. 5/14/23  Yes Robert Fernandez MD   insulin lispro (HUMALOG/ADMELOG) 100 UNIT/ML injection Inject 2-9 Units under the skin into the appropriate area as directed 3 (Three) Times a Day With Meals. 5/14/23  Yes Robert Fernandez MD   Insulin Pen Needle 32G X 4 MM misc use daily with insulin 5/14/23  Yes Robert Fernandez MD   risperiDONE (risperDAL M-TABS) 0.25 MG tablet dispersible disintegrating tablet Place 1  tablet on the tongue 2 (Two) Times a Day As Needed for Agitation (hallucinations). 5/14/23  Yes Robert Fernandez MD   amLODIPine (NORVASC) 5 MG tablet Take 1 tablet by mouth Daily. 9/13/22   Jase Buchanan DO   apixaban (ELIQUIS) 2.5 MG tablet tablet Take 1 tablet by mouth 2 (Two) Times a Day. 4/14/23   Malcom Acevedo MD   atorvastatin (LIPITOR) 80 MG tablet Take 1 tablet by mouth Daily.    Kimberly Hair MD   Cholecalciferol (Vitamin D3) 1.25 MG (72045 UT) capsule Take 1 capsule by mouth Every 7 (Seven) Days. Wednesdays    Kimberly Hair MD   clopidogrel (PLAVIX) 75 MG tablet Take 1 tablet by mouth Daily. 9/12/22   Jase Buchanan DO   DULoxetine (CYMBALTA) 20 MG capsule Take 2 capsules by mouth Daily.    Kimberly Hair MD   glimepiride (AMARYL) 2 MG tablet Take 1 tablet by mouth Daily.    Kimberly Hair MD   glucose blood (Accu-Chek Guide) test strip 1 each by Other route 3 (Three) Times a Day Before Meals. 1 each by Other route 3 (Three) Times a Day Before Meals. Dx code: E11.65 9/12/22   Jase Buchanan DO   HYDROcodone-acetaminophen (NORCO) 5-325 MG per tablet Take 1 tablet by mouth Every 6 (Six) Hours As Needed.    Kimberly Hair MD   hydrOXYzine (ATARAX) 50 MG tablet Take 2 tablets by mouth Every Night.    Kimberly Hair MD   insulin detemir (LEVEMIR) 100 UNIT/ML injection Inject 20 Units under the skin into the appropriate area as directed Daily 9/12/22   Jase Buchanan DO   Insulin Glargine (LANTUS SOLOSTAR) 100 UNIT/ML injection pen Inject 25 Units under the skin into the appropriate area as directed Every Night. 5/14/23   Robert Fernandez MD   isosorbide mononitrate (IMDUR) 60 MG 24 hr tablet Take 1 tablet by mouth Daily.    Kimberly Hair MD   metoprolol tartrate (LOPRESSOR) 100 MG tablet Take 1 tablet by mouth 2 (Two) Times a Day.    Kimberly Hair MD   SITagliptin (JANUVIA) 100 MG tablet Take 1 tablet by mouth Daily.    Provider,  MD Kimberly   Accu-Chek Softclix Lancets lancets 1 each by Other route 3 (Three) Times a Day Before Meals. 1 each by Other route 3 (Three) Times a Day Before Meals. Use as instructed. Dx code: E11.65 9/12/22 5/14/23  Jase Buchanan DO   Alcohol Swabs (Alcohol Prep) 70 % pads Apply 1 each topically 3 (Three) Times a Day Before Meals. 9/12/22 5/14/23  Jase Buchanan DO   Blood Glucose Monitoring Suppl (Accu-Chek Guide Me) w/Device kit use 3 times daily before meals 9/12/22 5/14/23  Jase Buchanan DO   Insulin Pen Needle 32G X 4 MM misc use daily with insulin 9/12/22 5/14/23  Jase Buchanan DO    Allergies:  Aspirin and Hydrocodone    Current Facility-Administered Medications   Medication Dose Route Frequency Provider Last Rate Last Admin   • acetaminophen (TYLENOL) tablet 650 mg  650 mg Oral Q6H PRN Robert Fernandez MD   650 mg at 05/13/23 1946   • amLODIPine (NORVASC) tablet 5 mg  5 mg Oral Q24H Robert Fernandez MD   5 mg at 05/13/23 1334   • apixaban (ELIQUIS) tablet 2.5 mg  2.5 mg Oral BID Robert Fernandez MD   2.5 mg at 05/14/23 0958   • buPROPion XL (WELLBUTRIN XL) 24 hr tablet 450 mg  450 mg Oral QAM Robert Fernandez MD   450 mg at 05/14/23 0652   • clopidogrel (PLAVIX) tablet 75 mg  75 mg Oral Daily Robert Fernandez MD   75 mg at 05/14/23 0958   • dextrose (D50W) (25 g/50 mL) IV injection 25 g  25 g Intravenous Q15 Min PRN Robert Fernandez MD       • dextrose (GLUTOSE) oral gel 15 g  15 g Oral Q15 Min PRN Robert Fernandez MD       • glucagon (GLUCAGEN) injection 1 mg  1 mg Intramuscular Q15 Min PRN Robert Fernandez MD       • HYDROcodone-acetaminophen (NORCO) 5-325 MG per tablet 1 tablet  1 tablet Oral Q6H PRN Robert Fernandez MD       • insulin glargine (LANTUS, SEMGLEE) injection 24 Units  24 Units Subcutaneous Nightly Katina Frazier MD   24 Units at 05/13/23 2003   • insulin lispro (HUMALOG/ADMELOG) injection 2-9 Units  2-9 Units Subcutaneous TID With Meals  Robert Fernandez MD   4 Units at 05/14/23 1003   • insulin lispro (HUMALOG/ADMELOG) injection 7 Units  7 Units Subcutaneous TID With Meals Katina Frazier MD   7 Units at 05/14/23 1017   • isosorbide mononitrate (IMDUR) 24 hr tablet 60 mg  60 mg Oral Daily Robert Fernandez MD   60 mg at 05/13/23 1334   • melatonin tablet 5 mg  5 mg Oral Nightly PRN New Sánchez PA-C       • metoprolol tartrate (LOPRESSOR) tablet 100 mg  100 mg Oral BID Robert Fernandez MD   100 mg at 05/13/23 2003   • ondansetron (ZOFRAN) tablet 4 mg  4 mg Oral Q6H PRN New Sánchez PA-C        Or   • ondansetron (ZOFRAN) injection 4 mg  4 mg Intravenous Q6H PRN New Sánchez PA-C       • risperiDONE (risperDAL M-TABS) disintegrating tablet 0.25 mg  0.25 mg Oral BID PRN Aarti Anderson MD   0.25 mg at 05/13/23 2004   • sodium chloride 0.9 % flush 10 mL  10 mL Intravenous PRN New Sánchez PA-C       • sodium chloride 0.9 % flush 10 mL  10 mL Intravenous PRN New Sánchez PA-C       • sodium chloride 0.9 % flush 10 mL  10 mL Intravenous Q12H New Sánchez PA-C   10 mL at 05/14/23 1006   • sodium chloride 0.9 % flush 10 mL  10 mL Intravenous PRN New Sánchez PA-C       • sodium chloride 0.9 % infusion 40 mL  40 mL Intravenous PRN New Sánchez PA-C            ________________________________________________________        OBJECTIVE:  Upon today's exam, the patient is resting comfortably in bed in no acute distress     Neurologic Exam    The patient is awake and alert and oriented x3  Normal speech  Cranial nerve examination demonstrate:  Full fields of vision to confrontation  Pupils are round, reactive to light and accommodation and size of about 3 mm  No ptosis or nystagmus  Funduscopic examination was not successful  Eye movements are conjugate     Sensation on the face and scalp are normal  Muscles of mastication are normal and symmetric  Muscles of  facial expression are  normal and symmetric  Hearing is intact bilaterally  Head turning and shoulder shrugs were unremarkable  Tongue was midline  I could not visualize her oropharynx or uvula     Motor examination:  Normal bulk, tone and strength was 5/5  No fasciculations     Sensory examination:  Intact for soft touch, pain and position sensation  Romberg was not evaluated     Reflexes:  0/4     Coordination:  Normal finger-to-nose to finger, rapid alternating movements and toe to finger     Gait:  Deferred     Toe signs:  Mute    ________________________________________________________   RESULTS REVIEW:    VITAL SIGNS:   Temp:  [97.4 °F (36.3 °C)-98.5 °F (36.9 °C)] 97.5 °F (36.4 °C)  Heart Rate:  [67-96] 74  Resp:  [10-18] 16  BP: ()/(58-80) 103/66     LABS:      Lab 05/13/23 2250 05/13/23  1905 05/13/23  1542 05/13/23  1117 05/12/23  1937 05/12/23  1735   WBC 12.30*  --   --  12.50*  --  12.00*   HEMOGLOBIN 13.0  --   --  14.8  --  15.9   HEMATOCRIT 40.4  --   --  46.6  --  48.5*   PLATELETS 290  --   --  311  --  341   NEUTROS ABS 6.90  --   --  8.20*  --  7.60*   LYMPHS ABS 4.00*  --   --  3.20*  --  3.40*   MONOS ABS 1.00*  --   --  0.80  --  0.80   EOS ABS 0.20  --   --  0.20  --  0.20   MCV 87.6  --   --  90.0  --  86.1   PROCALCITONIN  --   --   --  0.07 0.06  --    LACTATE 1.7 2.5* 2.6* 2.3*  --   --    PROTIME  --   --   --   --   --  9.9   APTT  --   --   --   --   --  25.6*         Lab 05/13/23 2250 05/13/23  1117 05/12/23  1937 05/12/23  1735   SODIUM 142 139  --  139   POTASSIUM 3.6 3.8  --  4.2   CHLORIDE 107 104  --  100   CO2 21.0* 21.0*  --  23.0   ANION GAP 14.0 14.0  --  16.0*   BUN 12 7  --  10   CREATININE 0.71 0.66  --  0.77   EGFR 98.7 101.8  --  89.5   GLUCOSE 119* 256*  --  245*   CALCIUM 9.2 9.2  --  9.9   MAGNESIUM 2.1  --   --   --    HEMOGLOBIN A1C  --   --   --  9.10*   TSH  --   --  2.170  --          Lab 05/13/23  1117 05/12/23  1735   TOTAL PROTEIN 7.0 7.8   ALBUMIN 4.4 4.7   GLOBULIN 2.6 3.1    ALT (SGPT) 24 28   AST (SGOT) 21 23   BILIRUBIN 0.7 0.6   ALK PHOS 116 128*         Lab 05/13/23  1117 05/12/23  1937 05/12/23  1735   PROBNP 154.0  --   --    HSTROP T  --  8 9   PROTIME  --   --  9.9   INR  --   --  <0.93*                 UA        9/6/2022    12:09 5/12/2023    18:15   Urinalysis   Squamous Epithelial Cells, UA 0-2   0-2     Specific Gravity, UA 1.029   1.030     Ketones, UA Negative   15 mg/dL (1+)     Blood, UA Trace   Negative     Leukocytes, UA Moderate (2+)   Negative     Nitrite, UA Positive   Negative     RBC, UA None Seen   None Seen     WBC, UA Too Numerous to Count   0-2     Bacteria, UA 1+   None Seen         Lab Results   Component Value Date    TSH 2.170 05/12/2023    HGBA1C 9.10 (H) 05/12/2023       IMAGING STUDIES:  CT Head Without Contrast    Result Date: 5/12/2023  Impression: Motion limited examination. 1. No gross acute intracranial abnormality. If high clinical concern, follow-up imaging would be recommended after clinical improvement. Electronically Signed: Howard Cruz  5/12/2023 6:19 PM EDT  Workstation ID: [id number]    CT Cervical Spine Without Contrast    Result Date: 5/12/2023  Impression: 1. No acute fracture or osseous malalignment. 2. Mild degenerative disc disease of the cervical spine with anterior endplate osteophyte formation at C4-C5, C5-C6, and C6-C7. Electronically Signed: Howard Cruz  5/12/2023 6:29 PM EDT  Workstation ID: [id number]    MRI Brain Without Contrast    Result Date: 5/13/2023  Impression: 1. No acute intracranial abnormality. 2. Mild chronic small vessel ischemic change. 3. Mild paranasal sinus mucosal disease. 4. 9 mm osteoma versus ossified meningioma at the left frontal calvarium inner table. Electronically Signed: Arsenio Abraham  5/13/2023 2:48 PM EDT  Workstation ID: [id number]    XR Chest 1 View    Result Date: 5/13/2023  Impression: No acute cardiopulmonary abnormality. Electronically Signed: Arsenio Abraham  5/13/2023 1:15 PM EDT   Workstation ID: [id number]    XR Pelvis 1 or 2 View    Result Date: 5/13/2023  Impression: Osteoarthritis without acute osseous abnormality. Electronically Signed: Arsenio Leigh  5/13/2023 1:17 PM EDT  Workstation ID: [id number]      I reviewed the patient's new clinical results.    ________________________________________________________     PROBLEM LIST:    Altered mental status    Diabetes mellitus    Tobacco abuse    CAD (coronary artery disease)    History of DVT (deep vein thrombosis)    Hallucinations    Altered mental status, unspecified altered mental status type            ASSESSMENT/PLAN:  Mental status changes, very nonspecific  She is doing fine right now    It could be multifactorial, including lack of sleep, psych issues, medication related  Infection    She is doing fine right now and there is nothing active neurologic going on    MRI brain was okay for conditions like no stroke or other issues, there is question about a meningioma or osteoma which needs to be followed up before we go to biopsy etc.    If there are further events then further evaluation can be done    Nothing suggesting seizure, stroke or CNS infection    Dr. Fernandez was present when I was evaluating her and I concur that she may be discharged to follow-up with neurology as outpatient if needed or return if there are further issues      I discussed the patient's findings and my recommendations with patient, nursing staff and primary care team    Haile Tapia MD  05/14/23  11:46 EDT

## 2023-05-15 LAB — QT INTERVAL: 391 MS

## 2023-05-15 NOTE — CASE MANAGEMENT/SOCIAL WORK
Case Management Discharge Note    Transportation Services  Private: Car    Final Discharge Disposition Code: 01 - home or self-care  
no vomiting/no diarrhea/no nausea/no fever

## 2023-05-18 LAB
BACTERIA SPEC AEROBE CULT: NORMAL
BACTERIA SPEC AEROBE CULT: NORMAL

## 2023-07-28 ENCOUNTER — OFFICE VISIT (OUTPATIENT)
Dept: ENDOCRINOLOGY | Facility: CLINIC | Age: 58
End: 2023-07-28
Payer: MEDICARE

## 2023-07-28 ENCOUNTER — SPECIALTY PHARMACY (OUTPATIENT)
Dept: ENDOCRINOLOGY | Facility: CLINIC | Age: 58
End: 2023-07-28
Payer: MEDICARE

## 2023-07-28 VITALS
OXYGEN SATURATION: 99 % | SYSTOLIC BLOOD PRESSURE: 118 MMHG | BODY MASS INDEX: 26.79 KG/M2 | HEART RATE: 78 BPM | WEIGHT: 151.2 LBS | HEIGHT: 63 IN | DIASTOLIC BLOOD PRESSURE: 82 MMHG

## 2023-07-28 DIAGNOSIS — E11.65 TYPE 2 DIABETES MELLITUS WITH HYPERGLYCEMIA, WITHOUT LONG-TERM CURRENT USE OF INSULIN: Primary | ICD-10-CM

## 2023-07-28 DIAGNOSIS — E78.5 HYPERLIPIDEMIA, UNSPECIFIED HYPERLIPIDEMIA TYPE: ICD-10-CM

## 2023-07-28 DIAGNOSIS — I10 HYPERTENSION, UNSPECIFIED TYPE: ICD-10-CM

## 2023-07-28 DIAGNOSIS — L97.521 DIABETIC ULCER OF LEFT FOOT ASSOCIATED WITH TYPE 2 DIABETES MELLITUS, LIMITED TO BREAKDOWN OF SKIN, UNSPECIFIED PART OF FOOT: ICD-10-CM

## 2023-07-28 DIAGNOSIS — I25.10 CORONARY ARTERY DISEASE WITHOUT ANGINA PECTORIS, UNSPECIFIED VESSEL OR LESION TYPE, UNSPECIFIED WHETHER NATIVE OR TRANSPLANTED HEART: ICD-10-CM

## 2023-07-28 DIAGNOSIS — E11.621 DIABETIC ULCER OF LEFT FOOT ASSOCIATED WITH TYPE 2 DIABETES MELLITUS, LIMITED TO BREAKDOWN OF SKIN, UNSPECIFIED PART OF FOOT: ICD-10-CM

## 2023-07-28 LAB — GLUCOSE BLDC GLUCOMTR-MCNC: 256 MG/DL (ref 70–105)

## 2023-07-28 PROCEDURE — 82948 REAGENT STRIP/BLOOD GLUCOSE: CPT | Performed by: INTERNAL MEDICINE

## 2023-07-28 RX ORDER — SEMAGLUTIDE 1.34 MG/ML
1 INJECTION, SOLUTION SUBCUTANEOUS WEEKLY
Qty: 3 ML | Refills: 5 | Status: SHIPPED | OUTPATIENT
Start: 2023-07-28

## 2023-07-28 RX ORDER — GLIMEPIRIDE 2 MG/1
TABLET ORAL
COMMUNITY
End: 2023-07-28

## 2023-07-28 RX ORDER — DULOXETINE 40 MG/1
1 CAPSULE, DELAYED RELEASE ORAL EVERY 24 HOURS
COMMUNITY
Start: 2023-04-17 | End: 2023-07-28

## 2023-07-28 RX ORDER — DULOXETINE 40 MG/1
CAPSULE, DELAYED RELEASE ORAL
COMMUNITY
Start: 2023-07-14 | End: 2023-07-28 | Stop reason: SDUPTHER

## 2023-07-28 RX ORDER — METOPROLOL TARTRATE 100 MG/1
100 TABLET ORAL
COMMUNITY
Start: 2023-05-24 | End: 2024-05-23

## 2023-07-28 RX ORDER — AMLODIPINE BESYLATE 5 MG/1
5 TABLET ORAL DAILY
Qty: 30 TABLET | Refills: 11 | COMMUNITY
Start: 2023-05-24 | End: 2023-07-28

## 2023-07-28 RX ORDER — BUPROPION HYDROCHLORIDE 450 MG/1
1 TABLET, FILM COATED, EXTENDED RELEASE ORAL DAILY
COMMUNITY
Start: 2023-04-17 | End: 2023-07-28 | Stop reason: SDUPTHER

## 2023-07-28 RX ORDER — ATORVASTATIN CALCIUM 80 MG/1
1 TABLET, FILM COATED ORAL DAILY
COMMUNITY
Start: 2023-07-06 | End: 2023-07-28

## 2023-07-28 NOTE — PROGRESS NOTES
Specialty Pharmacy Patient Management Program  Endocrinology Introduction to Services Outreach     Blossom Martin is a 58 y.o. female seen by an Endocrinology provider for Type 2 Diabetes . This was an initial visit to introduce Endocrinology Patient Management Program and Specialty Pharmacy services offered by [facility], as the patient is taking specialty medication Jardiance and Ozempic.     Blossom Martin is undecided about filling specialty medication at [facility] and/or enrollment in the Endocrine Disorders Patient Management Program at this time and enrollment is therefore UNDER REVIEW. Patient remains eligible for services in the future if desired. Plan to follow-up at her next office visit.    Results of Benefits Investigation  Ozempic and jardiance-$0  Patient is in the catastrophic coverage. She states that the ozempic has only gotten as high as $4.    Relevant Past Medical History and Comorbidities  Past Medical History:   Diagnosis Date    CAD (coronary artery disease)     Diabetes     DVT (deep venous thrombosis)     Hypertension      Social History     Socioeconomic History    Marital status:     Number of children: 1    Highest education level: High school graduate   Tobacco Use    Smoking status: Never    Smokeless tobacco: Never   Vaping Use    Vaping Use: Never used   Substance and Sexual Activity    Alcohol use: Not Currently     Comment: ONCE A YEAR    Drug use: Never    Sexual activity: Defer          Allergies  Aspirin and Hydrocodone       Current Medication List    Current Outpatient Medications:     Accu-Chek Softclix Lancets lancets, 1 each by Other route 3 (Three) Times a Day Before Meals. 1 each by Other route 3 (Three) Times a Day Before Meals. Use as instructed. Dx code: E11.65, Disp: 100 each, Rfl: 2    Alcohol Swabs (Alcohol Prep) 70 % pads, Apply 1 each topically 3 (Three) Times a Day Before Meals., Disp: 100 each, Rfl: 2    amLODIPine  (NORVASC) 5 MG tablet, Take 1 tablet by mouth Daily., Disp: 30 tablet, Rfl: 2    apixaban (ELIQUIS) 2.5 MG tablet tablet, Take 1 tablet by mouth 2 (Two) Times a Day., Disp: 12 tablet, Rfl: 0    atorvastatin (LIPITOR) 80 MG tablet, Take 1 tablet by mouth Daily., Disp: , Rfl:     Blood Glucose Monitoring Suppl (Accu-Chek Guide Me) w/Device kit, use 3 times daily before meals, Disp: 1 kit, Rfl: 0    buPROPion XL (FORFIVO XL) 450 MG 24 hr tablet, Take 1 tablet by mouth Every Morning., Disp: , Rfl:     clopidogrel (PLAVIX) 75 MG tablet, Take 1 tablet by mouth Daily., Disp: 30 tablet, Rfl: 2    DULoxetine (CYMBALTA) 20 MG capsule, Take 2 capsules by mouth Daily., Disp: , Rfl:     empagliflozin (Jardiance) 10 MG tablet tablet, Take 1 tablet by mouth Daily., Disp: 30 tablet, Rfl: 1    glucose blood (Accu-Chek Guide) test strip, 1 each by Other route 3 (Three) Times a Day Before Meals. 1 each by Other route 3 (Three) Times a Day Before Meals. Dx code: E11.65, Disp: 100 each, Rfl: 2    HYDROcodone-acetaminophen (NORCO) 5-325 MG per tablet, Take 1 tablet by mouth Every 6 (Six) Hours As Needed., Disp: , Rfl:     hydrOXYzine (ATARAX) 50 MG tablet, Take 2 tablets by mouth Every Night., Disp: 30 tablet, Rfl: 0    Insulin Pen Needle 32G X 4 MM misc, use daily with insulin, Disp: 100 each, Rfl: 2    metoprolol succinate XL (TOPROL-XL) 50 MG 24 hr tablet, Take 1 tablet by mouth Daily., Disp: , Rfl:     metoprolol tartrate (LOPRESSOR) 100 MG tablet, Take 1 tablet by mouth., Disp: , Rfl:     mupirocin (BACTROBAN) 2 % ointment, Apply 1 application topically to the appropriate area as directed 3 (Three) Times a Day., Disp: 30 g, Rfl: 0    ondansetron (Zofran) 4 MG tablet, Take 1 tablet by mouth Every 8 (Eight) Hours As Needed for Nausea or Vomiting., Disp: 90 tablet, Rfl: 0    Ozempic, 1 MG/DOSE, 4 MG/3ML solution pen-injector, Inject 1 mg under the skin into the appropriate area as directed 1 (One) Time Per Week., Disp: 3 mL, Rfl: 5     risperiDONE (risperDAL) 0.25 MG tablet, 1 tablet 2 (Two) Times a Day As Needed (hallucinations)., Disp: 60 tablet, Rfl: 0    Provided the following education on jardiance:  JARDIANCE® (empagliflozin)  Medication Expectations   Why am I taking this medication? You are taking this medication to lower blood sugar because you have type 2 diabetes. Diabetes is not curable but with proper medication and treatment, we can keep your blood sugar within your personalized target range. This medication also helps reduce the risk of death from heart attack or stroke if you have heart disease and type 2 diabetes.   What should I expect while on this medication? You should expect to see your blood sugar and A1c decrease over time. You may also see a decrease in your blood pressure and it can help some people lose weight.     How does the medication work? Jardiance works by helping to remove some sugar that the body doesn't need through urination.    How long will I be on this medication for? The amount of time you will be on this medication will be determined by your doctor based on blood sugar and A1c control. You will most likely be on this medication or another diabetes medication throughout your lifetime. Do not abruptly stop this medication without talking to your doctor first.    How do I take this medication? Take as directed on your prescription label. This medication is usually taken in the morning and can be given with or without food.    What are some possible side effects? You may notice increased urination, especially when you first start Jardiance. The most common side effects are urinary tract infections and yeast infections and are more commonly seen in females. Talk with your doctor if you notice white or yellow vaginal discharge, vaginal itching or odor of if you notice redness, itching, pain, or swelling of the penis and/or bad-smelling discharge from the penis.    What happens if I miss a dose? If you miss a  dose, take it as soon as you remember. If it is close to your next dose, skip it (do not take 2 doses at once)     Medication Safety   What are things I should warn my doctor immediately about? Tell your doctor if you have kidney disease, liver disease, heart failure, pancreas problems, or history of frequent genital yeast or urinary tract infections. Tell your doctor if you are on a low-salt diet, if you drink alcohol, or if you are having surgery. Talk to your doctor if you are pregnant, planning to become pregnant, or breastfeeding. Also tell your doctor if you notice any signs/symptoms of an allergic reaction (rash, hives, difficulty breathing, etc.).   What are things that I should be cautious of? Be cautious of any side effects from this medication. Talk to your doctor if any new ones develop or aren't getting better.   What are some medications that can interact with this one? Some medications that interact include diuretics (water pills) and other medications that may also lower your blood sugar such as insulins and glipizide/glimepiride/glyburide. Your doctor may reduce the dose of these medications when you start Jardiance to minimize low blood sugars. Always tell your doctor or pharmacist immediately if you start taking any new medications, including over-the-counter medications, vitamins, and herbal supplements.      Medication Storage/Handling   How should I handle this medication? Keep this medication out of reach of pets/children in tightly sealed container   How does this medication need to be stored? Store at room temperature and keep dry (don't keep in bathroom or other room with moisture)   How should I dispose of this medication? There should not be a need to dispose of this medication unless your provider decides to change the dose or therapy. If that is the case, take to your local police station for proper disposal. Some pharmacies also have take-back bins for medication drop-off.       Resources/Support   How can I remind myself to take this medication? You can download reminder apps to help you manage your refills. You may also set an alarm on your phone to remind you. The pharmacy carries pill boxes that you can place next to an area you pass everyday (such as where you place your car keys or where you charge your phone)   Is financial support available?  [employer] can provide co-pay cards if you have commercial insurance or patient assistance if you have Medicare or no insurance.    Which vaccines are recommended for me? Talk to your doctor about these vaccines: Flu, Coronavirus (COVID-19), Pneumococcal (pneumonia), Tdap, Hepatitis B, Zoster (shingles)           Changes to Therapy Plan Discussed with Patient  Medication Therapy Changes by Provider Start jardiance 10 mg daily. Continue ozempic 1 mg weekly. Patient denies a history of UTIs or yeast infections.  Discussed the pharmacy services with patient and provided the contact info. Will plan to follow up with patient at a future appointment.     Additional Plans, Therapy Recommendations, or Therapy Problems to Be Addressed: None     Trina Vanegas, PharmD  Clinical Specialty Pharmacist, Endocrinology  7/28/2023  16:02 EDT

## 2023-07-28 NOTE — PROGRESS NOTES
-----------------------------------------------------------------  ENDOCRINE CLINIC NOTE  -----------------------------------------------------------------        PATIENT NAME: Blossom Martin  PATIENT : 1965 AGE: 58 y.o.  MRN NUMBER: [id number]  PRIMARY CARE: Collin Low APRN    ==========================================================================    CHIEF COMPLAINT: T2DM  DATE OF SERVICE: 2023         ==========================================================================    HPI / SUBJECTIVE    58 y.o. female seen in the clinic today for evaluation and management of type 2 diabetes.  Actively denied fever, chills, headache, chest pain, SOB, cough, nausea, vomiting, abdominal pain, diarrhea and focal weakness / numbness.  Patient reports that she was first diagnosed with diabetes around 20 years ago, in an outpatient setting.  Patient last A1c was 9.1% in May 2023.  Her current therapy includes: Ozempic 1 mg once a week on .  Patient in the past have been maintained on metformin and glimepiride therapy, unable to tolerate both of those.  Denies any past history of recurrent UTIs.  Patient reports that she was maintained on insulin but last dose was around 10 years ago.  Patient reports that she is currently checking her blood sugar through fingerstick at least twice a day, fasting and before dinnertime.  Denies any low blood sugars.    Patient currently have 2 meals a day.  Patient is currently taking snacks couple of times a day that includes fresh fruits and crackers with cheese.  For last 1 month time she has been experiencing wound in the left foot.  Patient is following ophthalmologist, at [facility].    ==========================================================================    REVIEW OF SYSTEMS    Constitutional:  Denies fever or chills or tiredness  Eyes: Denies change in vision  HEENT:  Denies headache, sore throat or nasal congestion  Cardiovascular:  Denies  chest pain, palpitation or shortness of breath  Respiratory:  Denies cough, shortness of breath or sputum  Gastrointestinal:  Denies abdominal pain, nausea, vomiting, diarrhea or constipation  Genitourinary:  Denies dysuria or polyuria or hematuria  Musculoskeletal:  Denies any active muscle pain or bone pain  Neurologic:  Denies any focal weakness or numbness  Endocrine:  Please see HPI  Skin:  Denies any rash or skin breakdown    ==========================================================================                                                PAST MEDICAL HISTORY    Past Medical History:   Diagnosis Date    CAD (coronary artery disease)     Diabetes     DVT (deep venous thrombosis)     Hypertension        ==========================================================================    PAST SURGICAL HISTORY    Past Surgical History:   Procedure Laterality Date    ANGIOPLASTY  09/08/2022    failed peripheral angioplasty by Dr Kurtz    ANGIOPLASTY ILIAC ARTERY Bilateral 9/9/2022    Procedure: BILATERAL COMMON ILIAC ARTERY ANGIOPLASTY AND STENT PLACEMENT, RIGHT EXTERNAL ILIAC ARTERY ANGIOPLASTY AND STENT, RIGHT EXTERNAL ILIAC ARTERY DRUG COATED BALLOON ANGIOPLASTY AND RIGHT LOWER EXTREMITY ANGIOGRAM;  Surgeon: Shirley Kurtz MD;  Location: [facility] MAIN OR;  Service: Vascular;  Laterality: Bilateral;    CARDIAC CATHETERIZATION N/A 9/8/2022    Procedure: Thrombolysis-peripheral;  Surgeon: Shirley Kurtz MD;  Location: [facility] CATH INVASIVE LOCATION;  Service: Cardiovascular;  Laterality: N/A;    ILIAC ARTERY STENT Bilateral     At [facility]    LEFT HEART CATH         ==========================================================================    FAMILY HISTORY    Family History   Problem Relation Age of Onset    Diabetes type II Mother     Heart disease Mother     Stroke Mother     Hypertension Mother     High cholesterol Mother     No Known Problems Father     Diabetes type II Maternal Grandfather         ==========================================================================    SOCIAL HISTORY    Social History     Socioeconomic History    Marital status:     Number of children: 1    Highest education level: High school graduate   Tobacco Use    Smoking status: Never    Smokeless tobacco: Never   Vaping Use    Vaping Use: Never used   Substance and Sexual Activity    Alcohol use: Not Currently     Comment: ONCE A YEAR    Drug use: Never    Sexual activity: Defer       ==========================================================================    MEDICATIONS      Current Outpatient Medications:     Accu-Chek Softclix Lancets lancets, 1 each by Other route 3 (Three) Times a Day Before Meals. 1 each by Other route 3 (Three) Times a Day Before Meals. Use as instructed. Dx code: E11.65, Disp: 100 each, Rfl: 2    Alcohol Swabs (Alcohol Prep) 70 % pads, Apply 1 each topically 3 (Three) Times a Day Before Meals., Disp: 100 each, Rfl: 2    amLODIPine (NORVASC) 5 MG tablet, Take 1 tablet by mouth Daily., Disp: 30 tablet, Rfl: 2    apixaban (ELIQUIS) 2.5 MG tablet tablet, Take 1 tablet by mouth 2 (Two) Times a Day., Disp: 12 tablet, Rfl: 0    atorvastatin (LIPITOR) 80 MG tablet, Take 1 tablet by mouth Daily., Disp: , Rfl:     Blood Glucose Monitoring Suppl (Accu-Chek Guide Me) w/Device kit, use 3 times daily before meals, Disp: 1 kit, Rfl: 0    buPROPion XL (FORFIVO XL) 450 MG 24 hr tablet, Take 1 tablet by mouth Every Morning., Disp: , Rfl:     buPROPion XL (FORFIVO XL) 450 MG 24 hr tablet, Take 1 tablet by mouth Daily., Disp: , Rfl:     clopidogrel (PLAVIX) 75 MG tablet, Take 1 tablet by mouth Daily., Disp: 30 tablet, Rfl: 2    DULoxetine (CYMBALTA) 20 MG capsule, Take 2 capsules by mouth Daily., Disp: , Rfl:     DULoxetine HCl 40 MG capsule delayed-release particles, , Disp: , Rfl:     glucose blood (Accu-Chek Guide) test strip, 1 each by Other route 3 (Three) Times a Day Before Meals. 1 each by Other route 3  (Three) Times a Day Before Meals. Dx code: E11.65, Disp: 100 each, Rfl: 2    HYDROcodone-acetaminophen (NORCO) 5-325 MG per tablet, Take 1 tablet by mouth Every 6 (Six) Hours As Needed., Disp: , Rfl:     hydrOXYzine (ATARAX) 50 MG tablet, Take 2 tablets by mouth Every Night., Disp: 30 tablet, Rfl: 0    Insulin Pen Needle 32G X 4 MM misc, use daily with insulin, Disp: 100 each, Rfl: 2    metoprolol succinate XL (TOPROL-XL) 50 MG 24 hr tablet, Take 1 tablet by mouth Daily., Disp: , Rfl:     metoprolol tartrate (LOPRESSOR) 100 MG tablet, Take 1 tablet by mouth., Disp: , Rfl:     mupirocin (BACTROBAN) 2 % ointment, Apply 1 application topically to the appropriate area as directed 3 (Three) Times a Day., Disp: 30 g, Rfl: 0    ondansetron (Zofran) 4 MG tablet, Take 1 tablet by mouth Every 8 (Eight) Hours As Needed for Nausea or Vomiting., Disp: 90 tablet, Rfl: 0    Ozempic, 1 MG/DOSE, 4 MG/3ML solution pen-injector, INJECT ONE (1) pen INTO THE SKIN EVERY WEEK, Disp: , Rfl:     risperiDONE (risperDAL) 0.25 MG tablet, 1 tablet 2 (Two) Times a Day As Needed (hallucinations)., Disp: 60 tablet, Rfl: 0    amLODIPine (NORVASC) 5 MG tablet, Take 1 tablet by mouth Daily. (Patient not taking: Reported on 7/28/2023), Disp: 30 tablet, Rfl: 11    apixaban (ELIQUIS) 2.5 MG tablet tablet, Take 1 tablet by mouth Daily. (Patient not taking: Reported on 7/28/2023), Disp: , Rfl:     atorvastatin (LIPITOR) 80 MG tablet, Take 1 tablet by mouth Daily. (Patient not taking: Reported on 7/28/2023), Disp: , Rfl:     Cholecalciferol (Vitamin D3) 1.25 MG (39184 UT) capsule, Take 1 capsule by mouth Every 7 (Seven) Days. Wednesdays (Patient not taking: Reported on 7/28/2023), Disp: , Rfl:     doxepin (SINEquan) 75 MG capsule, TAKE ONE (1) CAPSULE BY MOUTH AT BEDTIME (Patient not taking: Reported on 7/28/2023), Disp: , Rfl:     DULoxetine HCl 40 MG capsule delayed-release particles, 1 capsule Daily. (Patient not taking: Reported on 7/28/2023), Disp: ,  Rfl:     glimepiride (AMARYL) 2 MG tablet, TAKE 1 TABLET BY MOUTH EVERY DAY for 90 (Patient not taking: Reported on 7/28/2023), Disp: , Rfl:     insulin aspart (NovoLOG FlexPen) 100 UNIT/ML solution pen-injector sc pen, Inject 7 Units under the skin into the appropriate area as directed 3 (Three) Times a Day With Meals. (Patient not taking: Reported on 7/28/2023), Disp: 15 mL, Rfl: 12    Insulin Glargine (LANTUS SOLOSTAR) 100 UNIT/ML injection pen, Inject 25 Units under the skin into the appropriate area as directed Every Night. (Patient not taking: Reported on 7/28/2023), Disp: 15 mL, Rfl: 6    insulin lispro (HUMALOG/ADMELOG) 100 UNIT/ML injection, Inject 2-9 Units under the skin into the appropriate area as directed 3 (Three) Times a Day With Meals. 150-200 2 units regular 201-250 4 units regular 251-300 6 units regular 301-350 8 units regular 351-400 10 units regular insulin (Patient not taking: Reported on 7/28/2023), Disp: 10 mL, Rfl: 12    isosorbide mononitrate (IMDUR) 60 MG 24 hr tablet, Take 1 tablet by mouth Daily. (Patient not taking: Reported on 7/28/2023), Disp: , Rfl:     metoclopramide (Reglan) 5 MG tablet, Take 1 tablet by mouth 3 (Three) Times a Day. (Patient not taking: Reported on 7/28/2023), Disp: 90 tablet, Rfl: 0    ==========================================================================    ALLERGIES    Allergies   Allergen Reactions    Aspirin GI Intolerance and Other (See Comments)     Nausea and vomiting       Hydrocodone Itching       ==========================================================================    OBJECTIVE    Vitals:    07/28/23 1353   BP: 118/82   Pulse: 78   SpO2: 99%     Body mass index is 26.78 kg/m².     General: Alert, cooperative, no acute distress  Back: Symmetric, no curvature, ROM normal  Lungs: Clear to auscultation bilaterally, respirations unlabored  Heart: Regular rate and rhythm, S1 and S2 normal, no murmur, rub or gallop  Abdomen: Soft, NT, ND and Bowel  sounds Positive  Extremities:  Left foot medial wound dry    ==========================================================================    LAB EVALUATION    Lab Results   Component Value Date    GLUCOSE 119 (H) 05/13/2023    BUN 12 05/13/2023    CREATININE 0.71 05/13/2023    EGFRIFNONA 105 05/27/2021    EGFRIFAFRI >60 12/10/2018    BCR 16.9 05/13/2023    K 3.6 05/13/2023    CO2 21.0 (L) 05/13/2023    CALCIUM 9.2 05/13/2023    ALBUMIN 4.4 05/13/2023    AST 21 05/13/2023    ALT 24 05/13/2023     Lab Results   Component Value Date    HGBA1C 9.10 (H) 05/12/2023    HGBA1C 10.2 (H) 09/07/2022     Lab Results   Component Value Date    CREATININE 0.71 05/13/2023     Lab Results   Component Value Date    TSH 2.170 05/12/2023       ==========================================================================    ASSESSMENT AND PLAN    Assessment:  #Uncontrolled type 2 diabetes with hyperglycemia with last A1c of 9.10% in May 2023, complicated with neuropathy  #Hypertension  #Hyperlipidemia  #History of coronary artery disease  #Diabetic foot wound    Plan:  -There is no blood glucose for me to review but patient denies any hypoglycemia episode and patient is maintained on Ozempic therapy alone as monotherapy which is very low risk for hypoglycemia  - Patient reports intolerance to metformin and sulfonylurea therapy  - Currently on Ozempic 1 mg and there is room for adjustment onto that but for now first we will initiate patient on SGLT2 inhibitor therapy to provide cardiovascular and renal mortality benefit  - Benefit and side effect of SGLT2 immunotherapy were discussed with patient in detail  - Start Jardiance 10 mg p.o. daily  - Patient can check blood sugars 1-2 times a week but ACHS 2 weeks prior to my follow-up  - Counseled patient to bring glucometer and logs during the next visit  - Patient to have diabetic wound and will benefit from podiatry consultation  - Blood work prior to next visit  - Patient does not qualify for  "CGM due to no insulin use      Thank you for courtesy of consultation.    Return to clinic: 4 weeks    Entire assessment and plan was discussed and counseled the patient in detail to which patient verbalized understanding and agreed with care.  Answered all queries and concerns.    This note was created using voice recognition software and is inherently subject to errors including those of syntax and \"sound-alike\" substitutions which may escape proofreading.  In such instances, original meaning may be extrapolated by contextual derivation.    ==========================================================================    INFORMATION PROVIDED TO PATIENT    Patient Instructions   Please,    - Continue Ozempic 1 mg shot once a week.  - Start Jardiance 10 mg, 1 pill by mouth daily.    Check your blood sugars in fasting state 2-3 times a week.  A week prior to my visit please check blood sugars 4 times a day that includes before each meal and before bedtime.  Please maintain log of all your sugars and bring your machine and log during the next visit.    Follow-up with me in 4 weeks time along with repeat blood work, blood work is fasting.    Thank you for your visit today.    If you have any questions or concerns please feel free to reach out of the office.        ==========================================================================  Bandar Clifton MD  Department of Endocrine, Diabetes and Metabolism  [facility], [place]  ==========================================================================    "

## 2023-07-28 NOTE — PATIENT INSTRUCTIONS
Please,    - Continue Ozempic 1 mg shot once a week.  - Start Jardiance 10 mg, 1 pill by mouth daily.    Check your blood sugars in fasting state 2-3 times a week.  A week prior to my visit please check blood sugars 4 times a day that includes before each meal and before bedtime.  Please maintain log of all your sugars and bring your machine and log during the next visit.    Follow-up with me in 4 weeks time along with repeat blood work, blood work is fasting.    Thank you for your visit today.    If you have any questions or concerns please feel free to reach out of the office.

## 2023-08-09 ENCOUNTER — TELEPHONE (OUTPATIENT)
Dept: ENDOCRINOLOGY | Facility: CLINIC | Age: 58
End: 2023-08-09

## 2023-08-09 RX ORDER — LANCETS
1 EACH MISCELLANEOUS
Qty: 100 EACH | Refills: 2 | Status: SHIPPED | OUTPATIENT
Start: 2023-08-09 | End: 2023-09-12 | Stop reason: SDUPTHER

## 2023-08-09 NOTE — TELEPHONE ENCOUNTER
Caller: Blossom Martin    Relationship: Self    Best call back number: [phone]    Requested Prescriptions: Accu-Chek Softclix Lancets lancets   Requested Prescriptions      No prescriptions requested or ordered in this encounter        Pharmacy where request should be sent: [facility] IN [place], [place]. PT WAS UNABLE TO PROVIDE INFO.    Last office visit with prescribing clinician: 7/28/2023   Last telemedicine visit with prescribing clinician: Visit date not found   Next office visit with prescribing clinician: Visit date not found     Does the patient have less than a 3 day supply:  [] Yes  [x] No    Would you like a call back once the refill request has been completed: [x] Yes [] No    If the office needs to give you a call back, can they leave a voicemail: [x] Yes [] No    Tanisha Jasso Rep   08/09/23 11:18 EDT

## 2023-08-29 ENCOUNTER — TELEPHONE (OUTPATIENT)
Dept: ENDOCRINOLOGY | Facility: CLINIC | Age: 58
End: 2023-08-29
Payer: MEDICARE

## 2023-08-30 NOTE — TELEPHONE ENCOUNTER
Caller: Blossom Martin     Relationship: SELF     Best call back number: [phone]    What is your medical concern? LAB RESULTS         
I called and s/w patient. She states they were done at [facility] last week.  
I s/w medical records at [facility]. They will fax them over.  
911 or go to the nearest Emergency Room

## 2023-09-12 ENCOUNTER — OFFICE VISIT (OUTPATIENT)
Dept: ENDOCRINOLOGY | Facility: CLINIC | Age: 58
End: 2023-09-12
Payer: MEDICARE

## 2023-09-12 VITALS
BODY MASS INDEX: 25.98 KG/M2 | WEIGHT: 146.6 LBS | HEIGHT: 63 IN | RESPIRATION RATE: 20 BRPM | OXYGEN SATURATION: 99 % | HEART RATE: 69 BPM | DIASTOLIC BLOOD PRESSURE: 68 MMHG | SYSTOLIC BLOOD PRESSURE: 108 MMHG

## 2023-09-12 DIAGNOSIS — I10 HYPERTENSION, UNSPECIFIED TYPE: ICD-10-CM

## 2023-09-12 DIAGNOSIS — I73.9 PAD (PERIPHERAL ARTERY DISEASE): ICD-10-CM

## 2023-09-12 DIAGNOSIS — E11.65 TYPE 2 DIABETES MELLITUS WITH HYPERGLYCEMIA, WITHOUT LONG-TERM CURRENT USE OF INSULIN: Primary | ICD-10-CM

## 2023-09-12 DIAGNOSIS — E78.5 HYPERLIPIDEMIA, UNSPECIFIED HYPERLIPIDEMIA TYPE: ICD-10-CM

## 2023-09-12 DIAGNOSIS — E11.621 DIABETIC ULCER OF LEFT FOOT ASSOCIATED WITH TYPE 2 DIABETES MELLITUS, LIMITED TO BREAKDOWN OF SKIN, UNSPECIFIED PART OF FOOT: ICD-10-CM

## 2023-09-12 DIAGNOSIS — L97.521 DIABETIC ULCER OF LEFT FOOT ASSOCIATED WITH TYPE 2 DIABETES MELLITUS, LIMITED TO BREAKDOWN OF SKIN, UNSPECIFIED PART OF FOOT: ICD-10-CM

## 2023-09-12 DIAGNOSIS — I25.10 CORONARY ARTERY DISEASE WITHOUT ANGINA PECTORIS, UNSPECIFIED VESSEL OR LESION TYPE, UNSPECIFIED WHETHER NATIVE OR TRANSPLANTED HEART: ICD-10-CM

## 2023-09-12 RX ORDER — PROCHLORPERAZINE 25 MG/1
1 SUPPOSITORY RECTAL
Qty: 1 EACH | Refills: 1 | Status: SHIPPED | OUTPATIENT
Start: 2023-09-12

## 2023-09-12 RX ORDER — BLOOD SUGAR DIAGNOSTIC
1 STRIP MISCELLANEOUS
Qty: 100 EACH | Refills: 2 | Status: SHIPPED | OUTPATIENT
Start: 2023-09-12

## 2023-09-12 RX ORDER — LANCETS
1 EACH MISCELLANEOUS
Qty: 100 EACH | Refills: 2 | Status: SHIPPED | OUTPATIENT
Start: 2023-09-12

## 2023-09-12 RX ORDER — PROCHLORPERAZINE 25 MG/1
SUPPOSITORY RECTAL
Qty: 3 EACH | Refills: 5 | Status: SHIPPED | OUTPATIENT
Start: 2023-09-12

## 2023-09-12 NOTE — PROGRESS NOTES
-----------------------------------------------------------------  ENDOCRINE CLINIC NOTE  -----------------------------------------------------------------        PATIENT NAME: Blossom Martin  PATIENT : 1965 AGE: 58 y.o.  MRN NUMBER: [id number]  PRIMARY CARE: Collin Low APRN    ==========================================================================    CHIEF COMPLAINT: T2DM  DATE OF SERVICE: 2023         ==========================================================================    HPI / SUBJECTIVE    58 y.o. female is seen in the clinic today for follow up of Type 2 Diabetes Mellitus.  Patient since the last visit has an episode of peripheral artery disease occlusion and s/p surgical intervention on the left lower extremity for acute limb ischemia.  Patient was admitted in the hospital at [facility] in late 2023.  Diagnosed with diabetes around 20 years ago.  Last A1c was 8.8% in Aug 2023.    Current therapy includes:   Ozempic 1 mg once a week on   Jardiance 10 mg once a day  Patient in the past have been maintained on metformin and glimepiride therapy, unable to tolerate both of those.  Checking blood sugar with fingersticks apically in the fasting state but for last 1 week she has been checking it at least twice a day for me to review.  Tolerating both the medication without any reported side effects.  Denied any low blood sugars.  Still consuming 2 meals a day, very infrequent snacks reported.  Patient have wound in the lower extremity for diabetes as well.  Patient is following ophthalmologist, at [facility].    ==========================================================================                                                PAST MEDICAL HISTORY    Past Medical History:   Diagnosis Date    CAD (coronary artery disease)     Diabetes     DVT (deep venous thrombosis)     Hypertension         ==========================================================================    PAST SURGICAL HISTORY    Past Surgical History:   Procedure Laterality Date    ANGIOPLASTY  09/08/2022    failed peripheral angioplasty by Dr Kurtz    ANGIOPLASTY ILIAC ARTERY Bilateral 9/9/2022    Procedure: BILATERAL COMMON ILIAC ARTERY ANGIOPLASTY AND STENT PLACEMENT, RIGHT EXTERNAL ILIAC ARTERY ANGIOPLASTY AND STENT, RIGHT EXTERNAL ILIAC ARTERY DRUG COATED BALLOON ANGIOPLASTY AND RIGHT LOWER EXTREMITY ANGIOGRAM;  Surgeon: Shirley Kurtz MD;  Location: [facility] MAIN OR;  Service: Vascular;  Laterality: Bilateral;    CARDIAC CATHETERIZATION N/A 9/8/2022    Procedure: Thrombolysis-peripheral;  Surgeon: Shirley Kurtz MD;  Location: [facility] CATH INVASIVE LOCATION;  Service: Cardiovascular;  Laterality: N/A;    ILIAC ARTERY STENT Bilateral     At [facility]    LEFT HEART CATH         ==========================================================================    FAMILY HISTORY    Family History   Problem Relation Age of Onset    Diabetes type II Mother     Heart disease Mother     Stroke Mother     Hypertension Mother     High cholesterol Mother     No Known Problems Father     Diabetes type II Maternal Grandfather        ==========================================================================    SOCIAL HISTORY    Social History     Socioeconomic History    Marital status:     Number of children: 1    Highest education level: High school graduate   Tobacco Use    Smoking status: Former     Packs/day: 0.75     Years: 36.00     Pack years: 27.00     Types: Cigarettes    Smokeless tobacco: Never   Vaping Use    Vaping Use: Never used   Substance and Sexual Activity    Alcohol use: Not Currently     Comment: ONCE A YEAR    Drug use: Never    Sexual activity: Defer       ==========================================================================    MEDICATIONS      Current Outpatient Medications:     Accu-Chek  Softclix Lancets lancets, 1 each by Other route 3 (Three) Times a Day Before Meals. 1 each by Other route 3 (Three) Times a Day Before Meals. Use as instructed. Dx code: E11.65, Disp: 100 each, Rfl: 2    Alcohol Swabs (Alcohol Prep) 70 % pads, Apply 1 each topically 3 (Three) Times a Day Before Meals., Disp: 100 each, Rfl: 2    amLODIPine (NORVASC) 5 MG tablet, Take 1 tablet by mouth Daily., Disp: 30 tablet, Rfl: 2    apixaban (ELIQUIS) 2.5 MG tablet tablet, Take 1 tablet by mouth 2 (Two) Times a Day., Disp: 12 tablet, Rfl: 0    atorvastatin (LIPITOR) 80 MG tablet, Take 1 tablet by mouth Daily., Disp: , Rfl:     Blood Glucose Monitoring Suppl (Accu-Chek Guide Me) w/Device kit, use 3 times daily before meals, Disp: 1 kit, Rfl: 0    buPROPion XL (FORFIVO XL) 450 MG 24 hr tablet, Take 1 tablet by mouth Every Morning., Disp: , Rfl:     clopidogrel (PLAVIX) 75 MG tablet, Take 1 tablet by mouth Daily., Disp: 30 tablet, Rfl: 2    DULoxetine (CYMBALTA) 20 MG capsule, Take 2 capsules by mouth Daily., Disp: , Rfl:     glucose blood (Accu-Chek Guide) test strip, 1 each by Other route 3 (Three) Times a Day Before Meals. 1 each by Other route 3 (Three) Times a Day Before Meals. Dx code: E11.65, Disp: 100 each, Rfl: 2    hydrOXYzine (ATARAX) 50 MG tablet, Take 2 tablets by mouth Every Night., Disp: 30 tablet, Rfl: 0    metoprolol tartrate (LOPRESSOR) 100 MG tablet, Take 1 tablet by mouth., Disp: , Rfl:     mupirocin (BACTROBAN) 2 % ointment, Apply 1 application topically to the appropriate area as directed 3 (Three) Times a Day., Disp: 30 g, Rfl: 0    ondansetron (Zofran) 4 MG tablet, Take 1 tablet by mouth Every 8 (Eight) Hours As Needed for Nausea or Vomiting., Disp: 90 tablet, Rfl: 0    Ozempic, 1 MG/DOSE, 4 MG/3ML solution pen-injector, Inject 1 mg under the skin into the appropriate area as directed 1 (One) Time Per Week., Disp: 3 mL, Rfl: 5    risperiDONE (risperDAL) 0.25 MG tablet, 1 tablet 2 (Two) Times a Day As Needed  (hallucinations)., Disp: 60 tablet, Rfl: 0    ==========================================================================    ALLERGIES    Allergies   Allergen Reactions    Aspirin GI Intolerance and Other (See Comments)     Nausea and vomiting       Hydrocodone Itching       ==========================================================================    OBJECTIVE    Vitals:    09/12/23 1506   BP: 108/68   Pulse: 69   Resp: 20   SpO2: 99%     Body mass index is 25.97 kg/m².     General: Alert, cooperative, no acute distress  Thyroid:  no enlargement/tenderness/palpable nodules  Lungs: Clear to auscultation bilaterally, respirations unlabored  Heart: Regular rate and rhythm, S1 and S2 normal, no murmur, rub or gallop  Abdomen: Soft, NT, ND and Bowel sounds Positive  Extremities:  Extremities normal, atraumatic, no cyanosis or edema    ==========================================================================    LAB EVALUATION    Lab Results   Component Value Date    GLUCOSE 119 (H) 05/13/2023    BUN 12 05/13/2023    CREATININE 0.71 05/13/2023    EGFRIFNONA 105 05/27/2021    EGFRIFAFRI >60 12/10/2018    BCR 16.9 05/13/2023    K 3.6 05/13/2023    CO2 21.0 (L) 05/13/2023    CALCIUM 9.2 05/13/2023    ALBUMIN 4.4 05/13/2023    AST 21 05/13/2023    ALT 24 05/13/2023     Lab Results   Component Value Date    HGBA1C 9.10 (H) 05/12/2023    HGBA1C 10.2 (H) 09/07/2022     Lab Results   Component Value Date    CREATININE 0.71 05/13/2023     Lab Results   Component Value Date    TSH 2.170 05/12/2023       ==========================================================================    ASSESSMENT AND PLAN    Assessment:  #Type 2 diabetes with hyperglycemia  #Hypertension  #Hyperlipidemia continue atorvastatin therapy  #Coronary artery disease  #Diabetic foot wound  #Peripheral artery disease     Plan:  - Reviewed blood glucose and there is significant hyperglycemia noted both fasting and postprandial  - Patient is currently maintained on  "both Ozempic and Jardiance therapy, tolerating  - We will uptitrate the dose of Jardiance and patient will benefit from some insulin basal coverage  - We will adjust therapy as follows:  Continue Ozempic at 1 mg once a week  Increase Jardiance to 25 mg once a day  Start insulin glargine 10 units nightly  - Patient to check blood sugar twice a day  - Follow-up in my clinic back again in 2 months time  - Patient can benefit from contrast glucose monitoring system, ordered    Thank you for courtesy of consultation.    Return to clinic: 2 months    Entire assessment and plan was discussed and counseled the patient in detail to which patient verbalized understanding and agreed with care.  Answered all queries and concerns.    This note was created using voice recognition software and is inherently subject to errors including those of syntax and \"sound-alike\" substitutions which may escape proofreading.  In such instances, original meaning may be extrapolated by contextual derivation.    Note: Portions of this note may have been copied from previous notes but documentation have been reviewed and edited as necessary to support clinical decision making for today's visit.    ==========================================================================    INFORMATION PROVIDED TO PATIENT    Patient Instructions   Please,    - Continue Ozempic 1 mg once a week.  - Increase Jardiance to 25 mg 1 pill by mouth daily.  - Start insulin Lantus/glargine, 10 units once a day at bedtime.    Check your blood sugar twice a day at least: When you wake up in fasting state and before dinner.    Follow-up in my clinic back again in 2 months time.    Thank you for your visit today.    If you have any questions or concerns please feel free to reach out of the office.       ==========================================================================  Bandar Clifton MD  Department of Endocrine, Diabetes and Metabolism  [facility], " [place]  ==========================================================================

## 2023-09-12 NOTE — PATIENT INSTRUCTIONS
Please,    - Continue Ozempic 1 mg once a week.  - Increase Jardiance to 25 mg 1 pill by mouth daily.  - Start insulin Lantus/glargine, 10 units once a day at bedtime.    Check your blood sugar twice a day at least: When you wake up in fasting state and before dinner.    Follow-up in my clinic back again in 2 months time.    Thank you for your visit today.    If you have any questions or concerns please feel free to reach out of the office.

## 2023-09-14 DIAGNOSIS — I10 HYPERTENSION, UNSPECIFIED TYPE: ICD-10-CM

## 2023-09-14 DIAGNOSIS — E78.5 HYPERLIPIDEMIA, UNSPECIFIED HYPERLIPIDEMIA TYPE: ICD-10-CM

## 2023-09-14 DIAGNOSIS — E11.65 TYPE 2 DIABETES MELLITUS WITH HYPERGLYCEMIA, WITHOUT LONG-TERM CURRENT USE OF INSULIN: ICD-10-CM

## 2023-09-14 DIAGNOSIS — E11.621 DIABETIC ULCER OF LEFT FOOT ASSOCIATED WITH TYPE 2 DIABETES MELLITUS, LIMITED TO BREAKDOWN OF SKIN, UNSPECIFIED PART OF FOOT: ICD-10-CM

## 2023-09-14 DIAGNOSIS — I25.10 CORONARY ARTERY DISEASE WITHOUT ANGINA PECTORIS, UNSPECIFIED VESSEL OR LESION TYPE, UNSPECIFIED WHETHER NATIVE OR TRANSPLANTED HEART: ICD-10-CM

## 2023-09-14 DIAGNOSIS — L97.521 DIABETIC ULCER OF LEFT FOOT ASSOCIATED WITH TYPE 2 DIABETES MELLITUS, LIMITED TO BREAKDOWN OF SKIN, UNSPECIFIED PART OF FOOT: ICD-10-CM

## 2023-09-18 RX ORDER — EMPAGLIFLOZIN 10 MG/1
TABLET, FILM COATED ORAL
Qty: 30 TABLET | Refills: 1 | OUTPATIENT
Start: 2023-09-18

## 2023-10-11 DIAGNOSIS — I25.10 CORONARY ARTERY DISEASE WITHOUT ANGINA PECTORIS, UNSPECIFIED VESSEL OR LESION TYPE, UNSPECIFIED WHETHER NATIVE OR TRANSPLANTED HEART: ICD-10-CM

## 2023-10-11 DIAGNOSIS — E11.65 TYPE 2 DIABETES MELLITUS WITH HYPERGLYCEMIA, WITHOUT LONG-TERM CURRENT USE OF INSULIN: ICD-10-CM

## 2023-10-11 DIAGNOSIS — E11.621 DIABETIC ULCER OF LEFT FOOT ASSOCIATED WITH TYPE 2 DIABETES MELLITUS, LIMITED TO BREAKDOWN OF SKIN, UNSPECIFIED PART OF FOOT: ICD-10-CM

## 2023-10-11 DIAGNOSIS — E78.5 HYPERLIPIDEMIA, UNSPECIFIED HYPERLIPIDEMIA TYPE: ICD-10-CM

## 2023-10-11 DIAGNOSIS — L97.521 DIABETIC ULCER OF LEFT FOOT ASSOCIATED WITH TYPE 2 DIABETES MELLITUS, LIMITED TO BREAKDOWN OF SKIN, UNSPECIFIED PART OF FOOT: ICD-10-CM

## 2023-10-11 DIAGNOSIS — I10 HYPERTENSION, UNSPECIFIED TYPE: ICD-10-CM

## 2023-10-11 RX ORDER — EMPAGLIFLOZIN 10 MG/1
TABLET, FILM COATED ORAL
Qty: 30 TABLET | Refills: 1 | OUTPATIENT
Start: 2023-10-11

## 2023-10-25 NOTE — PLAN OF CARE
Goal Outcome Evaluation:  Plan of Care Reviewed With: patient        Progress: improving  Outcome Evaluation: Pt resting this shift. Pt has been able to ambulate to and from bathroom as well as in her room. Pt c/o pain x1, prn medication given and effective. Pt participated in therapy and did good. Will cont to monitor and document as needed/.   PATIENT STRAIGHT TO ER. Called patient with next follow up appointment.

## 2023-11-13 ENCOUNTER — OFFICE VISIT (OUTPATIENT)
Dept: ENDOCRINOLOGY | Facility: CLINIC | Age: 58
End: 2023-11-13
Payer: MEDICARE

## 2023-11-13 VITALS
SYSTOLIC BLOOD PRESSURE: 140 MMHG | HEIGHT: 63 IN | OXYGEN SATURATION: 98 % | BODY MASS INDEX: 25.23 KG/M2 | WEIGHT: 142.4 LBS | HEART RATE: 69 BPM | DIASTOLIC BLOOD PRESSURE: 88 MMHG

## 2023-11-13 DIAGNOSIS — I25.10 CORONARY ARTERY DISEASE WITHOUT ANGINA PECTORIS, UNSPECIFIED VESSEL OR LESION TYPE, UNSPECIFIED WHETHER NATIVE OR TRANSPLANTED HEART: ICD-10-CM

## 2023-11-13 DIAGNOSIS — E78.5 HYPERLIPIDEMIA, UNSPECIFIED HYPERLIPIDEMIA TYPE: ICD-10-CM

## 2023-11-13 DIAGNOSIS — E11.65 TYPE 2 DIABETES MELLITUS WITH HYPERGLYCEMIA, WITHOUT LONG-TERM CURRENT USE OF INSULIN: Primary | ICD-10-CM

## 2023-11-13 DIAGNOSIS — I10 HYPERTENSION, UNSPECIFIED TYPE: ICD-10-CM

## 2023-11-13 DIAGNOSIS — I73.9 PAD (PERIPHERAL ARTERY DISEASE): ICD-10-CM

## 2023-11-13 PROCEDURE — 1160F RVW MEDS BY RX/DR IN RCRD: CPT | Performed by: INTERNAL MEDICINE

## 2023-11-13 PROCEDURE — 3046F HEMOGLOBIN A1C LEVEL >9.0%: CPT | Performed by: INTERNAL MEDICINE

## 2023-11-13 PROCEDURE — 1159F MED LIST DOCD IN RCRD: CPT | Performed by: INTERNAL MEDICINE

## 2023-11-13 PROCEDURE — 3079F DIAST BP 80-89 MM HG: CPT | Performed by: INTERNAL MEDICINE

## 2023-11-13 PROCEDURE — 3077F SYST BP >= 140 MM HG: CPT | Performed by: INTERNAL MEDICINE

## 2023-11-13 PROCEDURE — 99214 OFFICE O/P EST MOD 30 MIN: CPT | Performed by: INTERNAL MEDICINE

## 2023-11-13 RX ORDER — METOPROLOL SUCCINATE 50 MG/1
50 TABLET, EXTENDED RELEASE ORAL DAILY
COMMUNITY
Start: 2023-10-11

## 2023-11-13 RX ORDER — SEMAGLUTIDE 1.34 MG/ML
1 INJECTION, SOLUTION SUBCUTANEOUS WEEKLY
Qty: 9 ML | Refills: 3 | Status: SHIPPED | OUTPATIENT
Start: 2023-11-13

## 2023-11-13 RX ORDER — ISOSORBIDE MONONITRATE 60 MG/1
TABLET, EXTENDED RELEASE ORAL
COMMUNITY
Start: 2023-11-08

## 2023-11-13 NOTE — PATIENT INSTRUCTIONS
Please,    - Continue Ozempic 1 mg once a week.  - Continue Jardiance 25 mg 1 pill by mouth daily.  - Increase insulin Lantus/glargine to 12 units once a day at bedtime.    Check your blood sugar twice a day at least: When you wake up in fasting state and before dinner.    Follow-up in my clinic back again in 3 months time with fasting blood work prior to visit.    Thank you for your visit today.    If you have any questions or concerns please feel free to reach out of the office.

## 2023-11-13 NOTE — PROGRESS NOTES
-----------------------------------------------------------------  ENDOCRINE CLINIC NOTE  -----------------------------------------------------------------        PATIENT NAME: Blossom Martin  PATIENT : 1965 AGE: 58 y.o.  MRN NUMBER: [id number]  PRIMARY CARE: Collin Low APRN    ==========================================================================    CHIEF COMPLAINT: T2DM  DATE OF SERVICE: 23    ==========================================================================    HPI / SUBJECTIVE    58 y.o. female is seen in the clinic today for follow up of Type 2 Diabetes Mellitus.  Last visit on 2023.  Diagnosed with diabetes around 20 years ago.  Last known A1c of 8.8% on 2023.  Current therapy:  Ozempic 1 mg once a week, Saturday  Jardiance 25 mg once a day, denied any UTI  Insulin glargine 10 units nightly  Previously maintained on metformin and glimepiride therapy, unable to tolerate both of those.  Checking blood sugars twice a day, before breakfast and before dinner.  No evidence of hypoglycemia.    Still consuming 2 meals a day, very infrequent snacks reported.  Following ophthalmologist, at [facility].  History of PAD, s/p intervention.    ==========================================================================                                                PAST MEDICAL HISTORY    Past Medical History:   Diagnosis Date    CAD (coronary artery disease)     Diabetes     DVT (deep venous thrombosis)     Hypertension        ==========================================================================    PAST SURGICAL HISTORY    Past Surgical History:   Procedure Laterality Date    ANGIOPLASTY  2022    failed peripheral angioplasty by Dr Kurtz    ANGIOPLASTY ILIAC ARTERY Bilateral 2022    Procedure: BILATERAL COMMON ILIAC ARTERY ANGIOPLASTY AND STENT PLACEMENT, RIGHT EXTERNAL ILIAC ARTERY ANGIOPLASTY AND STENT, RIGHT EXTERNAL ILIAC ARTERY DRUG COATED BALLOON  ANGIOPLASTY AND RIGHT LOWER EXTREMITY ANGIOGRAM;  Surgeon: Shirley Kurtz MD;  Location: [facility] MAIN OR;  Service: Vascular;  Laterality: Bilateral;    CARDIAC CATHETERIZATION N/A 9/8/2022    Procedure: Thrombolysis-peripheral;  Surgeon: Shirley Kurtz MD;  Location: [facility] CATH INVASIVE LOCATION;  Service: Cardiovascular;  Laterality: N/A;    ILIAC ARTERY STENT Bilateral     At [facility]    LEFT HEART CATH         ==========================================================================    FAMILY HISTORY    Family History   Problem Relation Age of Onset    Diabetes type II Mother     Heart disease Mother     Stroke Mother     Hypertension Mother     High cholesterol Mother     No Known Problems Father     Diabetes type II Maternal Grandfather        ==========================================================================    SOCIAL HISTORY    Social History     Socioeconomic History    Marital status:     Number of children: 1    Highest education level: High school graduate   Tobacco Use    Smoking status: Former     Packs/day: 0.75     Years: 36.00     Additional pack years: 0.00     Total pack years: 27.00     Types: Cigarettes    Smokeless tobacco: Never   Vaping Use    Vaping Use: Never used   Substance and Sexual Activity    Alcohol use: Not Currently     Comment: ONCE A YEAR    Drug use: Never    Sexual activity: Defer       ==========================================================================    MEDICATIONS      Current Outpatient Medications:     Accu-Chek Softclix Lancets lancets, 1 each by Other route 3 (Three) Times a Day Before Meals. 1 each by Other route 3 (Three) Times a Day Before Meals. Use as instructed. Dx code: E11.65, Disp: 100 each, Rfl: 2    Alcohol Swabs (Alcohol Prep) 70 % pads, Apply 1 each topically 3 (Three) Times a Day Before Meals., Disp: 100 each, Rfl: 2    amLODIPine (NORVASC) 5 MG tablet, Take 1 tablet by mouth Daily., Disp: 30 tablet, Rfl: 2     apixaban (ELIQUIS) 2.5 MG tablet tablet, Take 1 tablet by mouth 2 (Two) Times a Day., Disp: 12 tablet, Rfl: 0    atorvastatin (LIPITOR) 80 MG tablet, Take 1 tablet by mouth Daily., Disp: , Rfl:     Blood Glucose Monitoring Suppl (Accu-Chek Guide Me) w/Device kit, use 3 times daily before meals, Disp: 1 kit, Rfl: 0    buPROPion XL (FORFIVO XL) 450 MG 24 hr tablet, Take 1 tablet by mouth Every Morning., Disp: , Rfl:     clopidogrel (PLAVIX) 75 MG tablet, Take 1 tablet by mouth Daily., Disp: 30 tablet, Rfl: 2    Continuous Blood Gluc Sensor (Dexcom G6 Sensor), Use Every 10 (Ten) Days. Check ACHS, Disp: 3 each, Rfl: 5    Continuous Blood Gluc Transmit (Dexcom G6 Transmitter) misc, Use 1 each 4 (Four) Times a Day Before Meals & at Bedtime. Change every 3 months, Disp: 1 each, Rfl: 1    DULoxetine (CYMBALTA) 20 MG capsule, Take 2 capsules by mouth Daily., Disp: , Rfl:     empagliflozin (Jardiance) 25 MG tablet tablet, Take 1 tablet by mouth Daily., Disp: 30 tablet, Rfl: 5    glucose blood (Accu-Chek Guide) test strip, 1 each by Other route 3 (Three) Times a Day Before Meals. 1 each by Other route 3 (Three) Times a Day Before Meals. Dx code: E11.65, Disp: 100 each, Rfl: 2    hydrOXYzine (ATARAX) 50 MG tablet, Take 2 tablets by mouth Every Night., Disp: 30 tablet, Rfl: 0    Insulin Glargine (LANTUS SOLOSTAR) 100 UNIT/ML injection pen, Inject 10 Units under the skin into the appropriate area as directed Every Night., Disp: 15 mL, Rfl: 3    Insulin Pen Needle 32G X 4 MM misc, use daily with insulin, Disp: 100 each, Rfl: 2    isosorbide mononitrate (IMDUR) 60 MG 24 hr tablet, , Disp: , Rfl:     metoprolol succinate XL (TOPROL-XL) 50 MG 24 hr tablet, Take 1 tablet by mouth Daily., Disp: , Rfl:     metoprolol tartrate (LOPRESSOR) 100 MG tablet, Take 1 tablet by mouth., Disp: , Rfl:     mupirocin (BACTROBAN) 2 % ointment, Apply 1 application topically to the appropriate area as directed 3 (Three) Times a Day., Disp: 30 g, Rfl:  0    ondansetron (Zofran) 4 MG tablet, Take 1 tablet by mouth Every 8 (Eight) Hours As Needed for Nausea or Vomiting., Disp: 90 tablet, Rfl: 0    Ozempic, 1 MG/DOSE, 4 MG/3ML solution pen-injector, Inject 1 mg under the skin into the appropriate area as directed 1 (One) Time Per Week., Disp: 3 mL, Rfl: 5    risperiDONE (risperDAL) 0.25 MG tablet, 1 tablet 2 (Two) Times a Day As Needed (hallucinations)., Disp: 60 tablet, Rfl: 0    ==========================================================================    ALLERGIES    Allergies   Allergen Reactions    Aspirin GI Intolerance and Other (See Comments)     Nausea and vomiting       Hydrocodone Itching       ==========================================================================    OBJECTIVE    Vitals:    11/13/23 0846   BP: 140/88   Pulse: 69   SpO2: 98%     Body mass index is 25.23 kg/m².     General: Alert, cooperative, no acute distress  Thyroid:  no enlargement/tenderness/palpable nodules  Lungs: Clear to auscultation bilaterally, respirations unlabored  Heart: Regular rate and rhythm, S1 and S2 normal, no murmur, rub or gallop  Abdomen: Soft, NT, ND and Bowel sounds Positive    ==========================================================================    LAB EVALUATION    Lab Results   Component Value Date    GLUCOSE 119 (H) 05/13/2023    BUN 12 05/13/2023    CREATININE 0.71 05/13/2023    EGFRIFNONA 105 05/27/2021    EGFRIFAFRI >60 12/10/2018    BCR 16.9 05/13/2023    K 3.6 05/13/2023    CO2 21.0 (L) 05/13/2023    CALCIUM 9.2 05/13/2023    ALBUMIN 4.4 05/13/2023    AST 21 05/13/2023    ALT 24 05/13/2023     Lab Results   Component Value Date    HGBA1C 9.10 (H) 05/12/2023    HGBA1C 10.2 (H) 09/07/2022     Lab Results   Component Value Date    CREATININE 0.71 05/13/2023     Lab Results   Component Value Date    TSH 2.170 05/12/2023       ==========================================================================    ASSESSMENT AND PLAN    Assessment:  #Type 2  "diabetes with hyperglycemia  #Hypertension  #Hyperlipidemia continue atorvastatin therapy  #Coronary artery disease  #Peripheral artery disease     Plan:  - Reviewed blood glucose and blood sugar significantly better  - Patient currently maintained on Ozempic and Jardiance therapy, tolerating without any side effects  - Given BMI of 25.23 will avoid any further titration of Ozempic therapy  - Patient is now approved for CGM monitoring system, pending delivery  - We will find adjust the insulin Lantus to 12 units every night otherwise we will continue the same therapy without any changes  - New adjusted therapy:  Continue Ozempic at 1 mg once a week  Continue Jardiance 25 mg once a day  Increase insulin glargine to 12 units nightly  - Patient to check blood sugar twice a day and once patient have received CGM we will monitor blood sugar through CGM device  - Follow-up in my clinic back again in 3 months time    Return to clinic: 3 months    Entire assessment and plan was discussed and counseled the patient in detail to which patient verbalized understanding and agreed with care.  Answered all queries and concerns.    This note was created using voice recognition software and is inherently subject to errors including those of syntax and \"sound-alike\" substitutions which may escape proofreading.  In such instances, original meaning may be extrapolated by contextual derivation.    Note: Portions of this note may have been copied from previous notes but documentation have been reviewed and edited as necessary to support clinical decision making for today's visit.    ==========================================================================    INFORMATION PROVIDED TO PATIENT    Patient Instructions   Please,    - Continue Ozempic 1 mg once a week.  - Continue Jardiance 25 mg 1 pill by mouth daily.  - Increase insulin Lantus/glargine to 12 units once a day at bedtime.    Check your blood sugar twice a day at least: When " you wake up in fasting state and before dinner.    Follow-up in my clinic back again in 3 months time with fasting blood work prior to visit.    Thank you for your visit today.    If you have any questions or concerns please feel free to reach out of the office.       ==========================================================================  Bandar Clifton MD  Department of Endocrine, Diabetes and Metabolism  [place], [place]  ==========================================================================

## 2023-11-22 ENCOUNTER — TELEPHONE (OUTPATIENT)
Dept: ENDOCRINOLOGY | Facility: CLINIC | Age: 58
End: 2023-11-22
Payer: MEDICARE

## 2023-11-22 NOTE — TELEPHONE ENCOUNTER
Caller: Blossom Martin    Relationship to patient: Self    Best call back number: [phone] (home)      Patient is needing: PATIENT STATES HER PHARMACY NEEDS A PRIOR AUTH FOR THE OZEMPIC DUE TO INSURANCE. SHE IS COMPLETELY OUT. PLEASE ASSIST, THANK YOU!    MEDICATION:  Ozempic, 1 MG/DOSE, 4 MG/3ML solution pen-injector     PHARMACY:  [place] [address], [facility]. [phone] [facility] [phone]

## 2023-11-30 NOTE — TELEPHONE ENCOUNTER
CaseId:[id number];Status:Approved;Review Type:Prior Auth;Coverage Start Date:10/31/2023;Coverage End Date:11/29/2024    LM informing pt that PA was approved.

## 2023-12-05 ENCOUNTER — APPOINTMENT (OUTPATIENT)
Dept: GENERAL RADIOLOGY | Facility: HOSPITAL | Age: 58
End: 2023-12-05
Payer: MEDICARE

## 2023-12-05 ENCOUNTER — HOSPITAL ENCOUNTER (EMERGENCY)
Facility: HOSPITAL | Age: 58
Discharge: HOME | End: 2023-12-05
Attending: EMERGENCY MEDICINE
Payer: MEDICARE

## 2023-12-05 VITALS
TEMPERATURE: 97.9 F | SYSTOLIC BLOOD PRESSURE: 116 MMHG | HEART RATE: 74 BPM | WEIGHT: 142.64 LBS | OXYGEN SATURATION: 98 % | BODY MASS INDEX: 25.27 KG/M2 | HEIGHT: 63 IN | DIASTOLIC BLOOD PRESSURE: 64 MMHG | RESPIRATION RATE: 20 BRPM

## 2023-12-05 DIAGNOSIS — S92.355A CLOSED NONDISPLACED FRACTURE OF FIFTH METATARSAL BONE OF LEFT FOOT, INITIAL ENCOUNTER: Primary | ICD-10-CM

## 2023-12-05 PROCEDURE — 73630 X-RAY EXAM OF FOOT: CPT

## 2023-12-05 PROCEDURE — 99283 EMERGENCY DEPT VISIT LOW MDM: CPT

## 2023-12-06 NOTE — ED PROVIDER NOTES
Subjective   History of Present Illness  58-year-old female who states she got up to walk at 8 PM and felt a pain in her left lateral foot with ambulation.  No other symptoms.      Review of Systems   Musculoskeletal:         Left foot pain       Past Medical History:   Diagnosis Date    CAD (coronary artery disease)     Diabetes     DVT (deep venous thrombosis)     Hypertension        Allergies   Allergen Reactions    Aspirin GI Intolerance and Other (See Comments)     Nausea and vomiting       Hydrocodone Itching       Past Surgical History:   Procedure Laterality Date    ANGIOPLASTY  09/08/2022    failed peripheral angioplasty by Dr Kurtz    ANGIOPLASTY ILIAC ARTERY Bilateral 9/9/2022    Procedure: BILATERAL COMMON ILIAC ARTERY ANGIOPLASTY AND STENT PLACEMENT, RIGHT EXTERNAL ILIAC ARTERY ANGIOPLASTY AND STENT, RIGHT EXTERNAL ILIAC ARTERY DRUG COATED BALLOON ANGIOPLASTY AND RIGHT LOWER EXTREMITY ANGIOGRAM;  Surgeon: Shirley Kurtz MD;  Location: [facility] MAIN OR;  Service: Vascular;  Laterality: Bilateral;    CARDIAC CATHETERIZATION N/A 9/8/2022    Procedure: Thrombolysis-peripheral;  Surgeon: Shirley Kurtz MD;  Location: [facility] CATH INVASIVE LOCATION;  Service: Cardiovascular;  Laterality: N/A;    ILIAC ARTERY STENT Bilateral     At [facility]    LEFT HEART CATH         Family History   Problem Relation Age of Onset    Diabetes type II Mother     Heart disease Mother     Stroke Mother     Hypertension Mother     High cholesterol Mother     No Known Problems Father     Diabetes type II Maternal Grandfather        Social History     Socioeconomic History    Marital status:     Number of children: 1    Highest education level: High school graduate   Tobacco Use    Smoking status: Former     Packs/day: 0.75     Years: 36.00     Additional pack years: 0.00     Total pack years: 27.00     Types: Cigarettes    Smokeless tobacco: Never   Vaping Use    Vaping Use: Never used   Substance and Sexual  Activity    Alcohol use: Not Currently     Comment: ONCE A YEAR    Drug use: Never    Sexual activity: Defer           Objective   Physical Exam  Constitutional:       Appearance: Normal appearance.   HENT:      Head: Normocephalic and atraumatic.   Cardiovascular:      Pulses: Normal pulses.   Musculoskeletal:      Comments: Left foot without any swelling, there is tenderness over the lateral aspect.  Cap refill is normal, pedal pulses intact   Skin:     General: Skin is warm and dry.      Capillary Refill: Capillary refill takes less than 2 seconds.   Neurological:      Mental Status: She is alert.      Sensory: No sensory deficit.      Motor: No weakness.   Psychiatric:         Mood and Affect: Mood normal.         Behavior: Behavior normal.         Procedures           ED Course                                             Medical Decision Making  Left fifth metatarsal fracture, placed in walking boot, podiatry follow-up, patient Clines any narcotic pain medicine    Problems Addressed:  Closed nondisplaced fracture of fifth metatarsal bone of left foot, initial encounter: complicated acute illness or injury    Amount and/or Complexity of Data Reviewed  Radiology: ordered.        Final diagnoses:   Closed nondisplaced fracture of fifth metatarsal bone of left foot, initial encounter       ED Disposition  ED Disposition       ED Disposition   Discharge    Condition   Stable    Comment   --               Maykel Mcclellan Jr., DPM  [address] [facility] [address]  [place] [address]  [phone]    Schedule an appointment as soon as possible for a visit            Medication List      No changes were made to your prescriptions during this visit.            Micheal Leal MD  12/05/23 [id number]       Micheal Leal MD  12/05/23 3630

## 2023-12-14 ENCOUNTER — TELEPHONE (OUTPATIENT)
Dept: ENDOCRINOLOGY | Facility: CLINIC | Age: 58
End: 2023-12-14
Payer: MEDICARE

## 2024-02-05 ENCOUNTER — LAB (OUTPATIENT)
Dept: LAB | Facility: HOSPITAL | Age: 59
End: 2024-02-05
Payer: MEDICARE

## 2024-02-05 DIAGNOSIS — E11.65 TYPE 2 DIABETES MELLITUS WITH HYPERGLYCEMIA, WITHOUT LONG-TERM CURRENT USE OF INSULIN: ICD-10-CM

## 2024-02-05 LAB
ALBUMIN SERPL-MCNC: 4.3 G/DL (ref 3.5–5.2)
ALBUMIN UR-MCNC: 1.4 MG/DL
ALBUMIN/GLOB SERPL: 1.5 G/DL
ALP SERPL-CCNC: 99 U/L (ref 39–117)
ALT SERPL W P-5'-P-CCNC: 31 U/L (ref 1–33)
ANION GAP SERPL CALCULATED.3IONS-SCNC: 12.3 MMOL/L (ref 5–15)
AST SERPL-CCNC: 31 U/L (ref 1–32)
BILIRUB SERPL-MCNC: 0.2 MG/DL (ref 0–1.2)
BUN SERPL-MCNC: 13 MG/DL (ref 6–20)
BUN/CREAT SERPL: 20.3 (ref 7–25)
CALCIUM SPEC-SCNC: 9.5 MG/DL (ref 8.6–10.5)
CHLORIDE SERPL-SCNC: 105 MMOL/L (ref 98–107)
CHOLEST SERPL-MCNC: 125 MG/DL (ref 0–200)
CO2 SERPL-SCNC: 24.7 MMOL/L (ref 22–29)
CREAT SERPL-MCNC: 0.64 MG/DL (ref 0.57–1)
CREAT UR-MCNC: 76.8 MG/DL
EGFRCR SERPLBLD CKD-EPI 2021: 101.9 ML/MIN/1.73
GLOBULIN UR ELPH-MCNC: 2.8 GM/DL
GLUCOSE SERPL-MCNC: 169 MG/DL (ref 65–99)
HBA1C MFR BLD: 7.4 % (ref 4.8–5.6)
HDLC SERPL-MCNC: 39 MG/DL (ref 40–60)
LDLC SERPL CALC-MCNC: 63 MG/DL (ref 0–100)
LDLC/HDLC SERPL: 1.53 {RATIO}
MICROALBUMIN/CREAT UR: 18.2 MG/G (ref 0–29)
POTASSIUM SERPL-SCNC: 4.5 MMOL/L (ref 3.5–5.2)
PROT SERPL-MCNC: 7.1 G/DL (ref 6–8.5)
SODIUM SERPL-SCNC: 142 MMOL/L (ref 136–145)
TRIGL SERPL-MCNC: 132 MG/DL (ref 0–150)
VLDLC SERPL-MCNC: 23 MG/DL (ref 5–40)

## 2024-02-05 PROCEDURE — 83036 HEMOGLOBIN GLYCOSYLATED A1C: CPT

## 2024-02-05 PROCEDURE — 80061 LIPID PANEL: CPT

## 2024-02-05 PROCEDURE — 80053 COMPREHEN METABOLIC PANEL: CPT

## 2024-02-05 PROCEDURE — 36415 COLL VENOUS BLD VENIPUNCTURE: CPT

## 2024-02-05 PROCEDURE — 82570 ASSAY OF URINE CREATININE: CPT

## 2024-02-05 PROCEDURE — 82043 UR ALBUMIN QUANTITATIVE: CPT

## 2024-03-04 ENCOUNTER — OFFICE VISIT (OUTPATIENT)
Dept: ENDOCRINOLOGY | Facility: CLINIC | Age: 59
End: 2024-03-04
Payer: MEDICARE

## 2024-03-04 VITALS — HEIGHT: 63 IN | BODY MASS INDEX: 25.52 KG/M2 | WEIGHT: 144 LBS

## 2024-03-04 DIAGNOSIS — E11.65 TYPE 2 DIABETES MELLITUS WITH HYPERGLYCEMIA, WITHOUT LONG-TERM CURRENT USE OF INSULIN: Primary | ICD-10-CM

## 2024-03-04 DIAGNOSIS — I25.10 CORONARY ARTERY DISEASE WITHOUT ANGINA PECTORIS, UNSPECIFIED VESSEL OR LESION TYPE, UNSPECIFIED WHETHER NATIVE OR TRANSPLANTED HEART: ICD-10-CM

## 2024-03-04 DIAGNOSIS — E78.5 HYPERLIPIDEMIA, UNSPECIFIED HYPERLIPIDEMIA TYPE: ICD-10-CM

## 2024-03-04 DIAGNOSIS — I10 HYPERTENSION, UNSPECIFIED TYPE: ICD-10-CM

## 2024-03-04 PROCEDURE — 3051F HG A1C>EQUAL 7.0%<8.0%: CPT | Performed by: INTERNAL MEDICINE

## 2024-03-04 PROCEDURE — 99214 OFFICE O/P EST MOD 30 MIN: CPT | Performed by: INTERNAL MEDICINE

## 2024-03-04 RX ORDER — SEMAGLUTIDE 1.34 MG/ML
1 INJECTION, SOLUTION SUBCUTANEOUS WEEKLY
Start: 2024-03-04

## 2024-03-04 NOTE — PROGRESS NOTES
-----------------------------------------------------------------  ENDOCRINE CLINIC NOTE  -----------------------------------------------------------------        PATIENT NAME: Blossom Martin  PATIENT : 1965 AGE: 58 y.o.  MRN NUMBER: [id number]  PRIMARY CARE: Collin Low APRN    ==========================================================================    CHIEF COMPLAINT: T2DM  DATE OF SERVICE: 24    ==========================================================================    HPI / SUBJECTIVE    58 y.o. female is seen in the clinic today for follow up of Type 2 Diabetes Mellitus.  Diagnosed with diabetes around 20 years ago.  Current therapy: Patient was off medication for last few weeks because there was problem with insurance and she was having trouble getting meds but not she have all the meds for last 1 week and was able to get everything back including dexcom G6.  Ozempic 1 mg once a week  Jardiance 25 mg once a day  Insulin glargine 12 units nightly, out for one month  Previously tried medication:  Metformin  Glimepiride  Checking blood sugars once a day.  No evidence of hypoglycemia.    Still consuming 2 meals a day, very infrequent snacks reported.  Following ophthalmologist, at [facility].  History of PAD, s/p intervention.  Had right sided foot fracture and following orthopedics in the clinic, right surgical boot in place.    ==========================================================================                                                PAST MEDICAL HISTORY    Past Medical History:   Diagnosis Date    CAD (coronary artery disease)     Diabetes     DVT (deep venous thrombosis)     Hypertension        ==========================================================================    PAST SURGICAL HISTORY    Past Surgical History:   Procedure Laterality Date    ANGIOPLASTY  2022    failed peripheral angioplasty by Dr Kurtz    ANGIOPLASTY ILIAC ARTERY Bilateral 2022     Procedure: BILATERAL COMMON ILIAC ARTERY ANGIOPLASTY AND STENT PLACEMENT, RIGHT EXTERNAL ILIAC ARTERY ANGIOPLASTY AND STENT, RIGHT EXTERNAL ILIAC ARTERY DRUG COATED BALLOON ANGIOPLASTY AND RIGHT LOWER EXTREMITY ANGIOGRAM;  Surgeon: Shirley Kurtz MD;  Location: [facility] MAIN OR;  Service: Vascular;  Laterality: Bilateral;    CARDIAC CATHETERIZATION N/A 9/8/2022    Procedure: Thrombolysis-peripheral;  Surgeon: Shirley Kurtz MD;  Location: [facility] CATH INVASIVE LOCATION;  Service: Cardiovascular;  Laterality: N/A;    ILIAC ARTERY STENT Bilateral     At [facility]    LEFT HEART CATH         ==========================================================================    FAMILY HISTORY    Family History   Problem Relation Age of Onset    Diabetes type II Mother     Heart disease Mother     Stroke Mother     Hypertension Mother     High cholesterol Mother     No Known Problems Father     Diabetes type II Maternal Grandfather        ==========================================================================    SOCIAL HISTORY    Social History     Socioeconomic History    Marital status:     Number of children: 1    Highest education level: High school graduate   Tobacco Use    Smoking status: Former     Current packs/day: 0.75     Average packs/day: 0.8 packs/day for 36.0 years (27.0 ttl pk-yrs)     Types: Cigarettes    Smokeless tobacco: Never   Vaping Use    Vaping status: Never Used   Substance and Sexual Activity    Alcohol use: Not Currently     Comment: ONCE A YEAR    Drug use: Never    Sexual activity: Defer       ==========================================================================    MEDICATIONS      Current Outpatient Medications:     Accu-Chek Softclix Lancets lancets, 1 each by Other route 3 (Three) Times a Day Before Meals. 1 each by Other route 3 (Three) Times a Day Before Meals. Use as instructed. Dx code: E11.65, Disp: 100 each, Rfl: 2    Alcohol Swabs (Alcohol Prep) 70 % pads,  Apply 1 each topically 3 (Three) Times a Day Before Meals., Disp: 100 each, Rfl: 2    amLODIPine (NORVASC) 5 MG tablet, Take 1 tablet by mouth Daily., Disp: 30 tablet, Rfl: 2    apixaban (ELIQUIS) 2.5 MG tablet tablet, Take 1 tablet by mouth 2 (Two) Times a Day., Disp: 12 tablet, Rfl: 0    atorvastatin (LIPITOR) 80 MG tablet, Take 1 tablet by mouth Daily., Disp: , Rfl:     Blood Glucose Monitoring Suppl (Accu-Chek Guide Me) w/Device kit, use 3 times daily before meals, Disp: 1 kit, Rfl: 0    buPROPion XL (FORFIVO XL) 450 MG 24 hr tablet, Take 1 tablet by mouth Every Morning., Disp: , Rfl:     clopidogrel (PLAVIX) 75 MG tablet, Take 1 tablet by mouth Daily., Disp: 30 tablet, Rfl: 2    Continuous Blood Gluc Sensor (Dexcom G6 Sensor), Use Every 10 (Ten) Days. Check ACHS, Disp: 3 each, Rfl: 5    Continuous Blood Gluc Transmit (Dexcom G6 Transmitter) misc, Use 1 each 4 (Four) Times a Day Before Meals & at Bedtime. Change every 3 months, Disp: 1 each, Rfl: 1    DULoxetine (CYMBALTA) 20 MG capsule, Take 2 capsules by mouth Daily., Disp: , Rfl:     empagliflozin (Jardiance) 25 MG tablet tablet, Take 1 tablet by mouth Daily., Disp: , Rfl:     glucose blood (Accu-Chek Guide) test strip, 1 each by Other route 3 (Three) Times a Day Before Meals. 1 each by Other route 3 (Three) Times a Day Before Meals. Dx code: E11.65, Disp: 100 each, Rfl: 2    hydrOXYzine (ATARAX) 50 MG tablet, Take 2 tablets by mouth Every Night., Disp: 30 tablet, Rfl: 0    Insulin Pen Needle 32G X 4 MM misc, use daily with insulin, Disp: 100 each, Rfl: 2    isosorbide mononitrate (IMDUR) 60 MG 24 hr tablet, , Disp: , Rfl:     metoprolol succinate XL (TOPROL-XL) 50 MG 24 hr tablet, Take 1 tablet by mouth Daily., Disp: , Rfl:     metoprolol tartrate (LOPRESSOR) 100 MG tablet, Take 1 tablet by mouth., Disp: , Rfl:     mupirocin (BACTROBAN) 2 % ointment, Apply 1 application topically to the appropriate area as directed 3 (Three) Times a Day., Disp: 30 g, Rfl:  0    ondansetron (Zofran) 4 MG tablet, Take 1 tablet by mouth Every 8 (Eight) Hours As Needed for Nausea or Vomiting., Disp: 90 tablet, Rfl: 0    Ozempic, 1 MG/DOSE, 4 MG/3ML solution pen-injector, Inject 1 mg under the skin into the appropriate area as directed 1 (One) Time Per Week., Disp: , Rfl:     risperiDONE (risperDAL) 0.25 MG tablet, 1 tablet 2 (Two) Times a Day As Needed (hallucinations)., Disp: 60 tablet, Rfl: 0    Insulin Glargine (LANTUS SOLOSTAR) 100 UNIT/ML injection pen, Inject 12 Units under the skin into the appropriate area as directed Every Night., Disp: , Rfl:     ==========================================================================    ALLERGIES    Allergies   Allergen Reactions    Aspirin GI Intolerance and Other (See Comments)     Nausea and vomiting       Hydrocodone Itching       ==========================================================================    OBJECTIVE    There were no vitals filed for this visit.    Body mass index is 25.51 kg/m².     General: Alert, cooperative, no acute distress  Thyroid:  no enlargement/tenderness/palpable nodules  Lungs: Clear to auscultation bilaterally, respirations unlabored  Heart: Regular rate and rhythm, S1 and S2 normal, no murmur, rub or gallop  Abdomen: Soft, NT, ND and Bowel sounds Positive    ==========================================================================    LAB EVALUATION    Lab Results   Component Value Date    GLUCOSE 169 (H) 02/05/2024    BUN 13 02/05/2024    CREATININE 0.64 02/05/2024    EGFRIFNONA 105 05/27/2021    EGFRIFAFRI >60 12/10/2018    BCR 20.3 02/05/2024    K 4.5 02/05/2024    CO2 24.7 02/05/2024    CALCIUM 9.5 02/05/2024    ALBUMIN 4.3 02/05/2024    AST 31 02/05/2024    ALT 31 02/05/2024    CHOL 125 02/05/2024    TRIG 132 02/05/2024    HDL 39 (L) 02/05/2024    LDL 63 02/05/2024     Lab Results   Component Value Date    HGBA1C 7.40 (H) 02/05/2024    HGBA1C 9.10 (H) 05/12/2023    HGBA1C 10.2 (H) 09/07/2022     Lab  "Results   Component Value Date    MICROALBUR 1.4 02/05/2024    CREATININE 0.64 02/05/2024     Lab Results   Component Value Date    TSH 2.170 05/12/2023       ==========================================================================    ASSESSMENT AND PLAN    #Type 2 diabetes with hyperglycemia  #Hypertension  #Hyperlipidemia continue atorvastatin therapy  #Coronary artery disease  #Peripheral artery disease     Plan:  - Unfortunately I do not have any blood glucose to review  - But patient now have Dexcom G6 which was placed in the clinic by the nurse today and she will continue to check blood sugar through Dexcom G6 and she have a   - Last A1c was 7.4% on 2/5/2024 which is close to the target which is less than 7% without any hypoglycemia for now  - Ideally patient target is to achieve A1c less than 6.5% without any hypoglycemia  - There was some concerns about coverage from the insurance due to pharmacy changes and patient was out of some medications intermittently  - All the scripts were resent back to [facility] and will connect patient to the pharmacist service in the clinic today to confirm all the medications are covered and maintain for her  - Will continue same therapy without any active changes:  Continue Ozempic at 1 mg once a week  Continue Jardiance 25 mg once a day  Continue insulin glargine to 12 units nightly  - Continue blood sugar through CGM device  - Follow-up in my clinic back again in 3 months time    Return to clinic: 3 months    Entire assessment and plan was discussed and counseled the patient in detail to which patient verbalized understanding and agreed with care.  Answered all queries and concerns.    This note was created using voice recognition software and is inherently subject to errors including those of syntax and \"sound-alike\" substitutions which may escape proofreading.  In such instances, original meaning may be extrapolated by contextual derivation.    Note: Portions of " this note may have been copied from previous notes but documentation have been reviewed and edited as necessary to support clinical decision making for today's visit.    ==========================================================================    INFORMATION PROVIDED TO PATIENT    Patient Instructions   Please,    - Continue Ozempic 1 mg once a week.  - Continue Jardiance 25 mg 1 pill by mouth daily.  - Continue insulin Lantus/glargine to 12 units once a day at bedtime.    Start checking her blood sugars with Dexcom G6.  Please always keep your glucometer handy if in case Dexcom G6 fails.  Please bring your Dexcom  with your visit every time for me to download and review the results.    Follow-up in my clinic back again in 3 months time with non-fasting blood work prior to visit.    Thank you for your visit today.    If you have any questions or concerns please feel free to reach out of the office.       ==========================================================================  Bandar Clifton MD  Department of Endocrine, Diabetes and Metabolism  [facility] [place]  ==========================================================================

## 2024-03-04 NOTE — PATIENT INSTRUCTIONS
Please,    - Continue Ozempic 1 mg once a week.  - Continue Jardiance 25 mg 1 pill by mouth daily.  - Continue insulin Lantus/glargine to 12 units once a day at bedtime.    Start checking her blood sugars with Dexcom G6.  Please always keep your glucometer handy if in case Dexcom G6 fails.  Please bring your Dexcom  with your visit every time for me to download and review the results.    Follow-up in my clinic back again in 3 months time with non-fasting blood work prior to visit.    Thank you for your visit today.    If you have any questions or concerns please feel free to reach out of the office.

## 2024-03-19 ENCOUNTER — APPOINTMENT (OUTPATIENT)
Dept: CT IMAGING | Facility: HOSPITAL | Age: 59
End: 2024-03-19
Payer: MEDICARE

## 2024-03-19 ENCOUNTER — HOSPITAL ENCOUNTER (EMERGENCY)
Facility: HOSPITAL | Age: 59
Discharge: HOME | End: 2024-03-19
Attending: EMERGENCY MEDICINE | Admitting: EMERGENCY MEDICINE
Payer: MEDICARE

## 2024-03-19 VITALS
HEART RATE: 76 BPM | BODY MASS INDEX: 25.16 KG/M2 | WEIGHT: 142 LBS | SYSTOLIC BLOOD PRESSURE: 166 MMHG | HEIGHT: 63 IN | DIASTOLIC BLOOD PRESSURE: 90 MMHG | OXYGEN SATURATION: 99 % | TEMPERATURE: 96 F | RESPIRATION RATE: 17 BRPM

## 2024-03-19 DIAGNOSIS — M79.604 PAIN IN BOTH LOWER EXTREMITIES: Primary | ICD-10-CM

## 2024-03-19 DIAGNOSIS — M79.605 PAIN IN BOTH LOWER EXTREMITIES: Primary | ICD-10-CM

## 2024-03-19 DIAGNOSIS — R44.3 HALLUCINATIONS: ICD-10-CM

## 2024-03-19 DIAGNOSIS — R45.851 SUICIDAL IDEATION: ICD-10-CM

## 2024-03-19 LAB
ALBUMIN SERPL-MCNC: 4.4 G/DL (ref 3.5–5.2)
ALBUMIN/GLOB SERPL: 1.6 G/DL
ALP SERPL-CCNC: 94 U/L (ref 39–117)
ALT SERPL W P-5'-P-CCNC: 23 U/L (ref 1–33)
ANION GAP SERPL CALCULATED.3IONS-SCNC: 12 MMOL/L (ref 5–15)
ANION GAP SERPL CALCULATED.3IONS-SCNC: 15 MMOL/L (ref 10–20)
APTT PPP: 26.5 SECONDS (ref 61–76.5)
AST SERPL-CCNC: 21 U/L (ref 1–32)
BASOPHILS # BLD AUTO: 0.1 10*3/MM3 (ref 0–0.2)
BASOPHILS NFR BLD AUTO: 0.8 % (ref 0–1.5)
BILIRUB SERPL-MCNC: 0.4 MG/DL (ref 0–1.2)
BUN BLDA-MCNC: 16 MG/DL (ref 8–26)
BUN SERPL-MCNC: 11 MG/DL (ref 6–20)
BUN/CREAT SERPL: 16.7 (ref 7–25)
CA-I BLDA-SCNC: 1.04 MMOL/L (ref 1.12–1.32)
CALCIUM SPEC-SCNC: 9.6 MG/DL (ref 8.6–10.5)
CHLORIDE BLDA-SCNC: 107 MMOL/L (ref 98–109)
CHLORIDE SERPL-SCNC: 108 MMOL/L (ref 98–107)
CK SERPL-CCNC: 126 U/L (ref 20–180)
CO2 BLDA-SCNC: 25 MMOL/L (ref 24–29)
CO2 SERPL-SCNC: 21 MMOL/L (ref 22–29)
CREAT BLDA-MCNC: 0.5 MG/DL (ref 0.6–1.3)
CREAT SERPL-MCNC: 0.66 MG/DL (ref 0.57–1)
D-LACTATE SERPL-SCNC: 1.4 MMOL/L (ref 0.3–2)
DEPRECATED RDW RBC AUTO: 44.3 FL (ref 37–54)
EGFRCR SERPLBLD CKD-EPI 2021: 101.2 ML/MIN/1.73
EGFRCR SERPLBLD CKD-EPI 2021: 108.2 ML/MIN/1.73
EOSINOPHIL # BLD AUTO: 0.33 10*3/MM3 (ref 0–0.4)
EOSINOPHIL NFR BLD AUTO: 2.8 % (ref 0.3–6.2)
ERYTHROCYTE [DISTWIDTH] IN BLOOD BY AUTOMATED COUNT: 13.2 % (ref 12.3–15.4)
GLOBULIN UR ELPH-MCNC: 2.8 GM/DL
GLUCOSE BLDC GLUCOMTR-MCNC: 134 MG/DL (ref 70–105)
GLUCOSE SERPL-MCNC: 137 MG/DL (ref 65–99)
HCT VFR BLD AUTO: 53.5 % (ref 34–46.6)
HCT VFR BLDA CALC: 50 % (ref 38–51)
HGB BLD-MCNC: 16.8 G/DL (ref 12–15.9)
HGB BLDA-MCNC: 17 G/DL (ref 12–17)
IMM GRANULOCYTES # BLD AUTO: 0.04 10*3/MM3 (ref 0–0.05)
IMM GRANULOCYTES NFR BLD AUTO: 0.3 % (ref 0–0.5)
INR PPP: 0.94 (ref 0.93–1.1)
LYMPHOCYTES # BLD AUTO: 4 10*3/MM3 (ref 0.7–3.1)
LYMPHOCYTES NFR BLD AUTO: 33.5 % (ref 19.6–45.3)
MAGNESIUM SERPL-MCNC: 2.2 MG/DL (ref 1.6–2.6)
MCH RBC QN AUTO: 28.8 PG (ref 26.6–33)
MCHC RBC AUTO-ENTMCNC: 31.4 G/DL (ref 31.5–35.7)
MCV RBC AUTO: 91.8 FL (ref 79–97)
MONOCYTES # BLD AUTO: 0.81 10*3/MM3 (ref 0.1–0.9)
MONOCYTES NFR BLD AUTO: 6.8 % (ref 5–12)
NEUTROPHILS NFR BLD AUTO: 55.8 % (ref 42.7–76)
NEUTROPHILS NFR BLD AUTO: 6.67 10*3/MM3 (ref 1.7–7)
NRBC BLD AUTO-RTO: 0 /100 WBC (ref 0–0.2)
PLATELET # BLD AUTO: 324 10*3/MM3 (ref 140–450)
PMV BLD AUTO: 10.1 FL (ref 6–12)
POTASSIUM BLDA-SCNC: 5.1 MMOL/L (ref 3.5–4.9)
POTASSIUM SERPL-SCNC: 4.8 MMOL/L (ref 3.5–5.2)
PROT SERPL-MCNC: 7.2 G/DL (ref 6–8.5)
PROTHROMBIN TIME: 10.3 SECONDS (ref 9.6–11.7)
RBC # BLD AUTO: 5.83 10*6/MM3 (ref 3.77–5.28)
SODIUM BLD-SCNC: 142 MMOL/L (ref 138–146)
SODIUM SERPL-SCNC: 141 MMOL/L (ref 136–145)
WBC NRBC COR # BLD AUTO: 11.95 10*3/MM3 (ref 3.4–10.8)

## 2024-03-19 PROCEDURE — 25510000001 IOPAMIDOL PER 1 ML: Performed by: NURSE PRACTITIONER

## 2024-03-19 PROCEDURE — 85610 PROTHROMBIN TIME: CPT | Performed by: NURSE PRACTITIONER

## 2024-03-19 PROCEDURE — 85730 THROMBOPLASTIN TIME PARTIAL: CPT | Performed by: NURSE PRACTITIONER

## 2024-03-19 PROCEDURE — 82550 ASSAY OF CK (CPK): CPT | Performed by: NURSE PRACTITIONER

## 2024-03-19 PROCEDURE — 83605 ASSAY OF LACTIC ACID: CPT

## 2024-03-19 PROCEDURE — 85014 HEMATOCRIT: CPT

## 2024-03-19 PROCEDURE — 83735 ASSAY OF MAGNESIUM: CPT | Performed by: NURSE PRACTITIONER

## 2024-03-19 PROCEDURE — 80047 BASIC METABLC PNL IONIZED CA: CPT

## 2024-03-19 PROCEDURE — 75635 CT ANGIO ABDOMINAL ARTERIES: CPT

## 2024-03-19 PROCEDURE — 80053 COMPREHEN METABOLIC PANEL: CPT | Performed by: NURSE PRACTITIONER

## 2024-03-19 PROCEDURE — 85025 COMPLETE CBC W/AUTO DIFF WBC: CPT | Performed by: NURSE PRACTITIONER

## 2024-03-19 PROCEDURE — 25810000003 SODIUM CHLORIDE 0.9 % SOLUTION: Performed by: NURSE PRACTITIONER

## 2024-03-19 PROCEDURE — 99285 EMERGENCY DEPT VISIT HI MDM: CPT

## 2024-03-19 RX ORDER — SODIUM CHLORIDE 0.9 % (FLUSH) 0.9 %
10 SYRINGE (ML) INJECTION AS NEEDED
Status: DISCONTINUED | OUTPATIENT
Start: 2024-03-19 | End: 2024-03-19 | Stop reason: HOSPADM

## 2024-03-19 RX ADMIN — IOPAMIDOL 100 ML: 755 INJECTION, SOLUTION INTRAVENOUS at 14:04

## 2024-03-19 RX ADMIN — SODIUM CHLORIDE 500 ML: 9 INJECTION, SOLUTION INTRAVENOUS at 11:40

## 2024-03-19 NOTE — ED NOTES
Patient gives permission to speak to her daughter, Ines, about condition and care. On the phone with her daughter at this time.

## 2024-03-19 NOTE — ED PROVIDER NOTES
Subjective   History of Present Illness       Chief complaint: Multiple complaints      Context: 58-year-old female past medical history significant for hallucinations diabetes CAD hypertension hyperlipidemia presents tearful stating she has been having some difficulty with her pharmacy since January for send has been out of her medications and has had hallucinations with suicidal ideation without specific plan.  She states she lives with her daughter who has been difficult.  She is complaining of some shakiness and increasing pain to her bilateral lower extremities.  She does have a history of neuropathy.  She is unsure if the discoloration is worse than normal but does not think so.  Denies any HI        PCP: Devante        Review of Systems   Constitutional:  Negative for fever.       Past Medical History:   Diagnosis Date    CAD (coronary artery disease)     Diabetes     DVT (deep venous thrombosis)     Hypertension        Allergies   Allergen Reactions    Aspirin GI Intolerance and Other (See Comments)     Nausea and vomiting       Hydrocodone Itching       Past Surgical History:   Procedure Laterality Date    ANGIOPLASTY  09/08/2022    failed peripheral angioplasty by Dr Kurtz    ANGIOPLASTY ILIAC ARTERY Bilateral 9/9/2022    Procedure: BILATERAL COMMON ILIAC ARTERY ANGIOPLASTY AND STENT PLACEMENT, RIGHT EXTERNAL ILIAC ARTERY ANGIOPLASTY AND STENT, RIGHT EXTERNAL ILIAC ARTERY DRUG COATED BALLOON ANGIOPLASTY AND RIGHT LOWER EXTREMITY ANGIOGRAM;  Surgeon: Shirley Kurtz MD;  Location: [facility] MAIN OR;  Service: Vascular;  Laterality: Bilateral;    CARDIAC CATHETERIZATION N/A 9/8/2022    Procedure: Thrombolysis-peripheral;  Surgeon: Shirley Kurtz MD;  Location: [facility] CATH INVASIVE LOCATION;  Service: Cardiovascular;  Laterality: N/A;    ILIAC ARTERY STENT Bilateral     At [facility]    LEFT HEART CATH         Family History   Problem Relation Age of Onset    Diabetes type II Mother     Heart  disease Mother     Stroke Mother     Hypertension Mother     High cholesterol Mother     No Known Problems Father     Diabetes type II Maternal Grandfather        Social History     Socioeconomic History    Marital status:     Number of children: 1    Highest education level: High school graduate   Tobacco Use    Smoking status: Former     Current packs/day: 0.75     Average packs/day: 0.8 packs/day for 36.0 years (27.0 ttl pk-yrs)     Types: Cigarettes    Smokeless tobacco: Never   Vaping Use    Vaping status: Never Used   Substance and Sexual Activity    Alcohol use: Not Currently     Comment: ONCE A YEAR    Drug use: Never    Sexual activity: Defer           Objective   Physical Exam    Vital signs and triage nurse note reviewed.  Constitutional: Awake, alert; tearful anxious  HEENT: Normocephalic, atraumatic;  with intact EOM; oropharynx is pink and moist without exudate or erythema.  Neck: Supple, full range of motion without pain   Cardiovascular: Regular rate and rhythm, normal S1-S2.  Pulmonary: Respiratory effort regular nonlabored, breath sounds clear to auscultation all fields.  Abdomen: Soft, nontender nondistended with normoactive bowel sounds; no rebound or guarding.  Musculoskeletal: Independent range of motion of all extremities; dopplerable DP pulses, there is purpleish discoloration/pallor with decreased cap refill to the bilateral lower remedies from the mid tib-fib to the toes.  Neuro: Alert oriented x3, speech is clear and appropriate, GCS 15  Skin:  Fleshtone warm, dry, intact; no erythematous or petechial rash or lesion      Procedures           ED Course  ED Course as of 03/21/24 2122   Tue Mar 19, 2024   1202 Patient was rescreened and noted to have a low SI score and suicide precautions were discontinued [JW]   1256 Waiting for patient to go to CT pending IV [JW]   1345 Waiting for patient to go to CT [JW]   1510 Waiting for psych eval [JW]   1602 Waiting for psych eval on [JW]    1641 Care transferred to [facility] pending psych eval with likely DC home [JW]   1729 Spoke with STACEY with [facility] who evaluated the patient, she states that patient is stable for discharge, would like to set up outpatient therapy.  They will fax over referral information.  Patient does not currently report SI or HI.  Patient agreeable with plan for discharge and outpatient follow-up []      ED Course User Index  [] Sheree Alba APRN  [] Zee Saldana PA      Labs Reviewed   COMPREHENSIVE METABOLIC PANEL - Abnormal; Notable for the following components:       Result Value    Glucose 137 (*)     Chloride 108 (*)     CO2 21.0 (*)     All other components within normal limits    Narrative:     GFR Normal >60  Chronic Kidney Disease <60  Kidney Failure <15     APTT - Abnormal; Notable for the following components:    PTT 26.5 (*)     All other components within normal limits   CBC WITH AUTO DIFFERENTIAL - Abnormal; Notable for the following components:    WBC 11.95 (*)     RBC 5.83 (*)     Hemoglobin 16.8 (*)     Hematocrit 53.5 (*)     MCHC 31.4 (*)     Lymphocytes, Absolute 4.00 (*)     All other components within normal limits   POCT CHEM 8 - Abnormal; Notable for the following components:    Glucose 134 (*)     Creatinine 0.50 (*)     POC Potassium 5.1 (*)     Ionized Calcium 1.04 (*)     All other components within normal limits   PROTIME-INR - Normal   MAGNESIUM - Normal   CK - Normal   POC LACTATE - Normal   CBC AND DIFFERENTIAL    Narrative:     The following orders were created for panel order CBC & Differential.  Procedure                               Abnormality         Status                     ---------                               -----------         ------                     CBC Auto Differential[[id number]]        Abnormal            Final result                 Please view results for these tests on the individual orders.     Medications   sodium chloride 0.9 % bolus 500  mL (0 mL Intravenous Stopped 3/19/24 1213)   iopamidol (ISOVUE-370) 76 % injection 100 mL (100 mL Intravenous Given 3/19/24 1404)     No radiology results for the last day  Prior to Admission medications    Medication Sig Start Date End Date Taking? Authorizing Provider   Accu-Chek Softclix Lancets lancets 1 each by Other route 3 (Three) Times a Day Before Meals. 1 each by Other route 3 (Three) Times a Day Before Meals. Use as instructed. Dx code: E11.65 9/12/23   Bandar Clifton MD   Alcohol Swabs (Alcohol Prep) 70 % pads Apply 1 each topically 3 (Three) Times a Day Before Meals. 5/14/23   Robert Fernandez MD   amLODIPine (NORVASC) 5 MG tablet Take 1 tablet by mouth Daily. 9/13/22   Jase Buchanan DO   apixaban (ELIQUIS) 2.5 MG tablet tablet Take 1 tablet by mouth 2 (Two) Times a Day. 4/14/23   Malcom Acevedo MD   atorvastatin (LIPITOR) 80 MG tablet Take 1 tablet by mouth Daily.    ProviderKimberly MD   Blood Glucose Monitoring Suppl (Accu-Chek Guide Me) w/Device kit use 3 times daily before meals 5/14/23   Robert Fernandez MD   buPROPion XL (FORFIVO XL) 450 MG 24 hr tablet Take 1 tablet by mouth Every Morning.    ProviderKimberly MD   clopidogrel (PLAVIX) 75 MG tablet Take 1 tablet by mouth Daily. 9/12/22   Jase Buchanan DO   Continuous Blood Gluc Sensor (Dexcom G6 Sensor) Use Every 10 (Ten) Days. Check ACHS 9/12/23   Bandar Clifton MD   Continuous Blood Gluc Transmit (Dexcom G6 Transmitter) misc Use 1 each 4 (Four) Times a Day Before Meals & at Bedtime. Change every 3 months 9/12/23   Bandar Clifton MD   DULoxetine (CYMBALTA) 20 MG capsule Take 2 capsules by mouth Daily.    ProviderKimberly MD   empagliflozin (Jardiance) 25 MG tablet tablet Take 1 tablet by mouth Daily. 3/4/24   Bandar Clifton MD   glucose blood (Accu-Chek Guide) test strip 1 each by Other route 3 (Three) Times a Day Before Meals. 1 each by Other route 3 (Three) Times a Day Before Meals. Dx code: E11.65 9/12/23   Bandar Clifton MD  "  hydrOXYzine (ATARAX) 50 MG tablet Take 2 tablets by mouth Every Night. 5/14/23   Robert Fernandez MD   Insulin Glargine (LANTUS SOLOSTAR) 100 UNIT/ML injection pen Inject 12 Units under the skin into the appropriate area as directed Every Night. 3/4/24   Bandar Clifton MD   Insulin Pen Needle 32G X 4 MM misc use daily with insulin 9/12/23   Bandar Clifton MD   isosorbide mononitrate (IMDUR) 60 MG 24 hr tablet  11/8/23   Kimberly Hair MD   metoprolol succinate XL (TOPROL-XL) 50 MG 24 hr tablet Take 1 tablet by mouth Daily. 10/11/23   Kimberly Hair MD   metoprolol tartrate (LOPRESSOR) 100 MG tablet Take 1 tablet by mouth. 5/24/23 5/23/24  Kimberly Hair MD   mupirocin (BACTROBAN) 2 % ointment Apply 1 application topically to the appropriate area as directed 3 (Three) Times a Day. 6/25/23   Tejas Wiseman MD   ondansetron (Zofran) 4 MG tablet Take 1 tablet by mouth Every 8 (Eight) Hours As Needed for Nausea or Vomiting. 5/14/23   Robert Fernandez MD   Ozempic, 1 MG/DOSE, 4 MG/3ML solution pen-injector Inject 1 mg under the skin into the appropriate area as directed 1 (One) Time Per Week. 3/4/24   Bandar Clifton MD   risperiDONE (risperDAL) 0.25 MG tablet 1 tablet 2 (Two) Times a Day As Needed (hallucinations). 5/14/23   Robert Fernandez MD                                            Medical Decision Making      /74   Pulse 78   Temp 96 °F (35.6 °C) (Temporal) Comment: oral would not read  Resp 18   Ht 160 cm (63\")   Wt 64.4 kg (142 lb)   SpO2 100%   BMI 25.15 kg/m²         Radiology interpretation: CT reviewed independently and interpreted by me: Chronic vascular stenosis and occlusions with grossly patent graft  Further interpretation by radiologist as above  Lab interpretation:  Labs all viewed by me and significant for, glucose 137 white count 11.9 lactic 1.4         Appropriate PPE worn during exam.  Patient had an IV established labs CT obtained to evaluate for " electrolyte abnormalities vascular occlusions which was notably unremarkable.  Psych evaluator was contacted due to patient's hallucinations and reported SI.         Problems Addressed:  Hallucinations: complicated acute illness or injury  Pain in both lower extremities: complicated acute illness or injury  Suicidal ideation: complicated acute illness or injury    Amount and/or Complexity of Data Reviewed  Labs: ordered.  Radiology: ordered.    Risk  Prescription drug management.        Final diagnoses:   Pain in both lower extremities   Suicidal ideation   Hallucinations       ED Disposition  ED Disposition       ED Disposition   Discharge    Condition   Stable    Comment   --               Collin Low, APRN  [address] [facility] [address] [phone]    Schedule an appointment as soon as possible for a visit in 2 days  As needed, If symptoms worsen    [address] [address] [phone]  Schedule an appointment as soon as possible for a visit in 2 days  As needed, If symptoms worsen         Medication List      No changes were made to your prescriptions during this visit.            Zee Saldana PA  03/21/24 0498

## 2024-03-19 NOTE — ED NOTES
Maine returned call from [facility] she is waiting to s/w RODRIGUEZ Ortiz RN

## 2024-03-19 NOTE — DISCHARGE INSTRUCTIONS
Take medications as prescribed.  Follow-up with resources as indicated by Renny for a psychiatrist and counselor    Follow-up with primary care for recheck  Call 911 if you have other thoughts of suicide or wanting to hurt yourself or someone else    Return to the ER for new or worsening symptoms

## 2024-08-26 ENCOUNTER — APPOINTMENT (OUTPATIENT)
Dept: GENERAL RADIOLOGY | Facility: HOSPITAL | Age: 59
End: 2024-08-26
Payer: MEDICARE

## 2024-08-26 ENCOUNTER — HOSPITAL ENCOUNTER (EMERGENCY)
Facility: HOSPITAL | Age: 59
Discharge: HOME | End: 2024-08-26
Attending: EMERGENCY MEDICINE
Payer: MEDICARE

## 2024-08-26 VITALS
HEART RATE: 73 BPM | RESPIRATION RATE: 13 BRPM | HEIGHT: 63 IN | OXYGEN SATURATION: 99 % | BODY MASS INDEX: 24.36 KG/M2 | WEIGHT: 137.5 LBS | TEMPERATURE: 98.3 F | DIASTOLIC BLOOD PRESSURE: 68 MMHG | SYSTOLIC BLOOD PRESSURE: 128 MMHG

## 2024-08-26 DIAGNOSIS — R07.9 CHEST PAIN, UNSPECIFIED TYPE: Primary | ICD-10-CM

## 2024-08-26 LAB
ALBUMIN SERPL-MCNC: 4.1 G/DL (ref 3.5–5.2)
ALBUMIN/GLOB SERPL: 2.1 G/DL
ALP SERPL-CCNC: 91 U/L (ref 39–117)
ALT SERPL W P-5'-P-CCNC: 26 U/L (ref 1–33)
ANION GAP SERPL CALCULATED.3IONS-SCNC: 10 MMOL/L (ref 5–15)
AST SERPL-CCNC: 19 U/L (ref 1–32)
BASOPHILS # BLD AUTO: 0.04 10*3/MM3 (ref 0–0.2)
BASOPHILS NFR BLD AUTO: 0.4 % (ref 0–1.5)
BILIRUB SERPL-MCNC: 0.2 MG/DL (ref 0–1.2)
BUN SERPL-MCNC: 15 MG/DL (ref 8–23)
BUN/CREAT SERPL: 26.3 (ref 7–25)
CALCIUM SPEC-SCNC: 9.3 MG/DL (ref 8.6–10.5)
CHLORIDE SERPL-SCNC: 106 MMOL/L (ref 98–107)
CO2 SERPL-SCNC: 25 MMOL/L (ref 22–29)
CREAT SERPL-MCNC: 0.57 MG/DL (ref 0.57–1)
DEPRECATED RDW RBC AUTO: 46.2 FL (ref 37–54)
EGFRCR SERPLBLD CKD-EPI 2021: 104.2 ML/MIN/1.73
EOSINOPHIL # BLD AUTO: 0.27 10*3/MM3 (ref 0–0.4)
EOSINOPHIL NFR BLD AUTO: 2.7 % (ref 0.3–6.2)
ERYTHROCYTE [DISTWIDTH] IN BLOOD BY AUTOMATED COUNT: 13.5 % (ref 12.3–15.4)
FLUAV SUBTYP SPEC NAA+PROBE: NOT DETECTED
FLUBV RNA ISLT QL NAA+PROBE: NOT DETECTED
GLOBULIN UR ELPH-MCNC: 2 GM/DL
GLUCOSE SERPL-MCNC: 166 MG/DL (ref 65–99)
HCT VFR BLD AUTO: 52 % (ref 34–46.6)
HGB BLD-MCNC: 16.3 G/DL (ref 12–15.9)
IMM GRANULOCYTES # BLD AUTO: 0.02 10*3/MM3 (ref 0–0.05)
IMM GRANULOCYTES NFR BLD AUTO: 0.2 % (ref 0–0.5)
LYMPHOCYTES # BLD AUTO: 3.13 10*3/MM3 (ref 0.7–3.1)
LYMPHOCYTES NFR BLD AUTO: 31.2 % (ref 19.6–45.3)
MCH RBC QN AUTO: 28.8 PG (ref 26.6–33)
MCHC RBC AUTO-ENTMCNC: 31.3 G/DL (ref 31.5–35.7)
MCV RBC AUTO: 91.9 FL (ref 79–97)
MONOCYTES # BLD AUTO: 0.88 10*3/MM3 (ref 0.1–0.9)
MONOCYTES NFR BLD AUTO: 8.8 % (ref 5–12)
NEUTROPHILS NFR BLD AUTO: 5.7 10*3/MM3 (ref 1.7–7)
NEUTROPHILS NFR BLD AUTO: 56.7 % (ref 42.7–76)
NT-PROBNP SERPL-MCNC: 124.1 PG/ML (ref 0–900)
PLATELET # BLD AUTO: 272 10*3/MM3 (ref 140–450)
PMV BLD AUTO: 10.1 FL (ref 6–12)
POTASSIUM SERPL-SCNC: 4 MMOL/L (ref 3.5–5.2)
PROT SERPL-MCNC: 6.1 G/DL (ref 6–8.5)
RBC # BLD AUTO: 5.66 10*6/MM3 (ref 3.77–5.28)
SARS-COV-2 RNA RESP QL NAA+PROBE: NOT DETECTED
SODIUM SERPL-SCNC: 141 MMOL/L (ref 136–145)
TROPONIN T SERPL HS-MCNC: 9 NG/L
WBC NRBC COR # BLD AUTO: 10.04 10*3/MM3 (ref 3.4–10.8)

## 2024-08-26 PROCEDURE — 93005 ELECTROCARDIOGRAM TRACING: CPT | Performed by: EMERGENCY MEDICINE

## 2024-08-26 PROCEDURE — 99284 EMERGENCY DEPT VISIT MOD MDM: CPT

## 2024-08-26 PROCEDURE — 84484 ASSAY OF TROPONIN QUANT: CPT | Performed by: EMERGENCY MEDICINE

## 2024-08-26 PROCEDURE — 93010 ELECTROCARDIOGRAM REPORT: CPT | Performed by: EMERGENCY MEDICINE

## 2024-08-26 PROCEDURE — 83880 ASSAY OF NATRIURETIC PEPTIDE: CPT | Performed by: NURSE PRACTITIONER

## 2024-08-26 PROCEDURE — 99284 EMERGENCY DEPT VISIT MOD MDM: CPT | Performed by: NURSE PRACTITIONER

## 2024-08-26 PROCEDURE — 71045 X-RAY EXAM CHEST 1 VIEW: CPT

## 2024-08-26 PROCEDURE — 85025 COMPLETE CBC W/AUTO DIFF WBC: CPT | Performed by: NURSE PRACTITIONER

## 2024-08-26 PROCEDURE — 80053 COMPREHEN METABOLIC PANEL: CPT | Performed by: NURSE PRACTITIONER

## 2024-08-26 PROCEDURE — 36415 COLL VENOUS BLD VENIPUNCTURE: CPT

## 2024-08-26 PROCEDURE — 87636 SARSCOV2 & INF A&B AMP PRB: CPT | Performed by: EMERGENCY MEDICINE

## 2024-08-26 RX ORDER — SODIUM CHLORIDE 0.9 % (FLUSH) 0.9 %
10 SYRINGE (ML) INJECTION AS NEEDED
Status: DISCONTINUED | OUTPATIENT
Start: 2024-08-26 | End: 2024-08-26 | Stop reason: HOSPADM

## 2024-08-26 RX ORDER — ACETAMINOPHEN 500 MG
1000 TABLET ORAL ONCE
Status: COMPLETED | OUTPATIENT
Start: 2024-08-26 | End: 2024-08-26

## 2024-08-26 RX ADMIN — ACETAMINOPHEN 1000 MG: 500 TABLET, FILM COATED ORAL at 19:49

## 2024-08-26 NOTE — FSED PROVIDER NOTE
EMERGENCY DEPARTMENT ENCOUNTER    Room Number:  05/05  Date seen:  8/26/2024  Time seen: 18:14 EDT  PCP: Collin Low APRN  Historian: patient    Discussed/obtained information from independent historians: Not applicable    HPI:  Chief complaint: Chest pain, body aches, abdominal pain  A complete HPI/ROS/PMH/PSH/SH/FH are unobtainable due to: Not applicable  Context:Blossom Martin is a 59 y.o. female with history of DVT, diabetes, coronary artery disease, who presents to the ED with c/o acute onset this morning of some chest tightness described as someone standing on her chest, mild abdominal pain and some dyspnea on exertion that started today.  She reports that she had to take her grandchildren this morning to a new pediatrician and that everyone of them has been sick with something or another.  She did endorse some acute onset of bodyaches and just feeling poorly and her symptoms hit her all of a sudden.  She reports decreased smell and taste.  She is compliant with Eliquis and Plavix and has not missed any doses.  Her symptoms are not made better or worse by anything.  She is a former smoker    External (non-ED) record review: I reviewed a cardiothoracic surgery office visit from May 1 of this year.  Patient had history of abnormal TERRA and underwent bilateral iliac stenting but developed an occluded right common iliac stent.  She ultimately required surgical thrombectomy with bilateral groin explorations.      Chronic or social conditions impacting care: Not applicable    ALLERGIES  Aspirin and Hydrocodone    PAST MEDICAL HISTORY  Active Ambulatory Problems     Diagnosis Date Noted    Arterial stent thrombosis, initial encounter 09/06/2022    Right leg pain 09/06/2022    Diabetes mellitus 02/28/2019    Hyperlipidemia 09/08/2022    Tobacco abuse 07/29/2020    CAD (coronary artery disease)     Hypertension     Anxiety disorder     History of DVT (deep vein thrombosis) 05/12/2023    Altered mental  status 05/12/2023    Hallucinations 05/12/2023    Altered mental status, unspecified altered mental status type 05/12/2023    Diabetic ulcer of left foot associated with type 2 diabetes mellitus, limited to breakdown of skin 07/28/2023     Resolved Ambulatory Problems     Diagnosis Date Noted    No Resolved Ambulatory Problems     Past Medical History:   Diagnosis Date    Diabetes     DVT (deep venous thrombosis)        PAST SURGICAL HISTORY  Past Surgical History:   Procedure Laterality Date    ANGIOPLASTY  09/08/2022    failed peripheral angioplasty by Dr Kurtz    ANGIOPLASTY ILIAC ARTERY Bilateral 9/9/2022    Procedure: BILATERAL COMMON ILIAC ARTERY ANGIOPLASTY AND STENT PLACEMENT, RIGHT EXTERNAL ILIAC ARTERY ANGIOPLASTY AND STENT, RIGHT EXTERNAL ILIAC ARTERY DRUG COATED BALLOON ANGIOPLASTY AND RIGHT LOWER EXTREMITY ANGIOGRAM;  Surgeon: Shirley Kurtz MD;  Location: [facility] MAIN OR;  Service: Vascular;  Laterality: Bilateral;    CARDIAC CATHETERIZATION N/A 9/8/2022    Procedure: Thrombolysis-peripheral;  Surgeon: Shirley Kurtz MD;  Location: [facility] CATH INVASIVE LOCATION;  Service: Cardiovascular;  Laterality: N/A;    ILIAC ARTERY STENT Bilateral     At [facility]    LEFT HEART CATH         FAMILY HISTORY  Family History   Problem Relation Age of Onset    Diabetes type II Mother     Heart disease Mother     Stroke Mother     Hypertension Mother     High cholesterol Mother     No Known Problems Father     Diabetes type II Maternal Grandfather        SOCIAL HISTORY  Social History     Socioeconomic History    Marital status:     Number of children: 1    Highest education level: High school graduate   Tobacco Use    Smoking status: Former     Current packs/day: 0.75     Average packs/day: 0.8 packs/day for 36.0 years (27.0 ttl pk-yrs)     Types: Cigarettes    Smokeless tobacco: Never   Vaping Use    Vaping status: Never Used   Substance and Sexual Activity    Alcohol use: Not Currently      Comment: ONCE A YEAR    Drug use: Never    Sexual activity: Defer       REVIEW OF SYSTEMS  Review of Systems    All systems reviewed and negative except for those discussed in HPI.     PHYSICAL EXAM    I have reviewed the triage vital signs and nursing notes.  Vitals:    08/26/24 1946   BP: 128/68   Pulse: 73   Resp: 13   Temp: 98.3 °F (36.8 °C)   SpO2: 99%     Physical Exam    GENERAL: not distressed  HENT: nares patent, voice normal  EYES: no scleral icterus  NECK: no ROM limitations  CV: regular rhythm, regular rate, no murmur  RESPIRATORY: normal effort, clear to auscultate bilaterally  ABDOMEN: soft  : deferred  MUSCULOSKELETAL: no deformity, no calf pain.  The DP and PT are palpable.  The left pedal pulse dorsally is a little bit stronger than the right.  NEURO: alert, moves all extremities, follows commands  SKIN: warm, dry    LAB RESULTS  Recent Results (from the past 24 hour(s))   ECG 12 Lead Chest Pain    Collection Time: 08/26/24  6:10 PM   Result Value Ref Range    QT Interval 374 ms    QTC Interval 416 ms   COVID-19 and FLU A/B PCR, 1 HR TAT - Swab, Nasopharynx    Collection Time: 08/26/24  6:14 PM    Specimen: Nasopharynx; Swab   Result Value Ref Range    COVID19 Not Detected Not Detected - Ref. Range    Influenza A PCR Not Detected Not Detected    Influenza B PCR Not Detected Not Detected   CBC Auto Differential    Collection Time: 08/26/24  6:48 PM    Specimen: Blood   Result Value Ref Range    WBC 10.04 3.40 - 10.80 10*3/mm3    RBC 5.66 (H) 3.77 - 5.28 10*6/mm3    Hemoglobin 16.3 (H) 12.0 - 15.9 g/dL    Hematocrit 52.0 (H) 34.0 - 46.6 %    MCV 91.9 79.0 - 97.0 fL    MCH 28.8 26.6 - 33.0 pg    MCHC 31.3 (L) 31.5 - 35.7 g/dL    RDW 13.5 12.3 - 15.4 %    RDW-SD 46.2 37.0 - 54.0 fl    MPV 10.1 6.0 - 12.0 fL    Platelets 272 140 - 450 10*3/mm3    Neutrophil % 56.7 42.7 - 76.0 %    Lymphocyte % 31.2 19.6 - 45.3 %    Monocyte % 8.8 5.0 - 12.0 %    Eosinophil % 2.7 0.3 - 6.2 %    Basophil % 0.4 0.0 - 1.5  %    Immature Grans % 0.2 0.0 - 0.5 %    Neutrophils, Absolute 5.70 1.70 - 7.00 10*3/mm3    Lymphocytes, Absolute 3.13 (H) 0.70 - 3.10 10*3/mm3    Monocytes, Absolute 0.88 0.10 - 0.90 10*3/mm3    Eosinophils, Absolute 0.27 0.00 - 0.40 10*3/mm3    Basophils, Absolute 0.04 0.00 - 0.20 10*3/mm3    Immature Grans, Absolute 0.02 0.00 - 0.05 10*3/mm3   Comprehensive Metabolic Panel    Collection Time: 08/26/24  7:21 PM    Specimen: Blood   Result Value Ref Range    Glucose 166 (H) 65 - 99 mg/dL    BUN 15 8 - 23 mg/dL    Creatinine 0.57 0.57 - 1.00 mg/dL    Sodium 141 136 - 145 mmol/L    Potassium 4.0 3.5 - 5.2 mmol/L    Chloride 106 98 - 107 mmol/L    CO2 25.0 22.0 - 29.0 mmol/L    Calcium 9.3 8.6 - 10.5 mg/dL    Total Protein 6.1 6.0 - 8.5 g/dL    Albumin 4.1 3.5 - 5.2 g/dL    ALT (SGPT) 26 1 - 33 U/L    AST (SGOT) 19 1 - 32 U/L    Alkaline Phosphatase 91 39 - 117 U/L    Total Bilirubin 0.2 0.0 - 1.2 mg/dL    Globulin 2.0 gm/dL    A/G Ratio 2.1 g/dL    BUN/Creatinine Ratio 26.3 (H) 7.0 - 25.0    Anion Gap 10.0 5.0 - 15.0 mmol/L    eGFR 104.2 >60.0 mL/min/1.73   BNP    Collection Time: 08/26/24  7:21 PM    Specimen: Blood   Result Value Ref Range    proBNP 124.1 0.0 - 900.0 pg/mL   Single High Sensitivity Troponin T    Collection Time: 08/26/24  7:21 PM    Specimen: Blood   Result Value Ref Range    HS Troponin T 9 <14 ng/L       Ordered the above labs and independently interpreted results.  My findings will be discussed in the ED course or medical decision making section below    RADIOLOGY RESULTS  XR Chest 1 View    Result Date: 8/26/2024  XR CHEST 1 VW Date of Exam: 8/26/2024 6:26 PM EDT Indication: soa, chest pain Comparison: May 13, 2023 Findings: The heart is not enlarged. The lungs seem clear. There are no pleural effusions. There are degenerative changes involving the dorsal spine. There is a small calcification adjacent to the right humeral head that could relate to calcific tendinosis.     Impression: 1.An acute  pulmonary process is not apparent. Electronically Signed: Sohan Alexandra MD  8/26/2024 6:56 PM EDT  Workstation ID: [id number]      Ordered the above noted radiological studies.  Independently interpreted by me.  My findings will be discussed in the medical decision section below.     PROGRESS, DATA ANALYSIS, CONSULTS AND MEDICAL DECISION MAKING    Please note that this section constitutes my independent interpretation of clinical data including lab results, radiology, EKG's.  This constitutes my independent professional opinion regarding differential diagnosis and management of this patient.  It may include any factors such as history from outside sources, review of external records, social determinants of health, management of medications, response to those treatments, and discussions with other providers.    ED Course as of 08/26/24 2016   Mon Aug 26, 2024   1859 I viewed the chest x-ray in PACS.  My independent interpretation is no acute infiltrate [EW]   1941 EKG          EKG time: 1810  Rhythm/Rate: 74, sinus   P waves and FL: normal FL, normal DOROTHY  QRS, axis: normal QRS and axis  ST and T waves: no acute ST/T wave abnormalities.    Interpreted Contemporaneously by me, independently viewed     [EW]   1943 Pt now tells me she had argument with her daughter last night and woke up with CP at 0500.  Will get single trop vs 2.  UNLESS the trop is elevated and if so will admit.  Her symptoms seem more viral in nature. Transfer of care to SOR[facility] Kwong pending remainder of workup and dispo [EW]      ED Course User Index  [EW] Ana Laura Simms APRN     Orders placed during this visit:  Orders Placed This Encounter   Procedures    COVID-19 and FLU A/B PCR, 1 HR TAT - Swab, Nasopharynx    XR Chest 1 View    Comprehensive Metabolic Panel    BNP    CBC Auto Differential    Single High Sensitivity Troponin T    Continuous Pulse Oximetry    ECG 12 Lead Chest Pain    Blood Draw With IV Start    Insert peripheral IV    CBC &  Differential    ED Acknowledgement Form Needed;            Medical Decision Making  Care assumed from Ana Laura nurse practitioner    Labs independently interpreted by me, labs are unremarkable, troponin is negative, patient been complaining of having chest pain since her argument around 5 AM so do not feel the need to hold patient for a repeat troponin at this time.  Chest x-ray, as interpreted by radiology  Impression:  1.An acute pulmonary process is not apparent.    At this point will discharge patient home and recommend follow-up with her primary care provider or cardiology.  Discussed results and plan of care with patient, patient is agreeable with plan of care, all questions answered at this time      Amount and/or Complexity of Data Reviewed  Labs: ordered.  Radiology: ordered.  ECG/medicine tests: ordered.    Risk  OTC drugs.  Prescription drug management.            DIAGNOSIS  Final diagnoses:   Chest pain, unspecified type          Medication List      No changes were made to your prescriptions during this visit.         FOLLOW-UP  Collin Low, APRN  [address] [facility] [place] [facility]  [phone]    In 1 week  As needed, If symptoms worsen        Latest Documented Vital Signs:  As of 20:16 EDT  BP- 128/68 HR- 73 Temp- 98.3 °F (36.8 °C) (Oral) O2 sat- 99%    Appropriate PPE utilized throughout this patient encounter to include mask, if indicated, per current protocol. Hand hygiene was performed before donning PPE and after removal when leaving the room.    Please note that portions of this were completed with a voice recognition program.     Note Disclaimer: At [facility], we believe that sharing information builds trust and better relationships. You are receiving this note because you are receiving care at [facility] or recently visited. It is possible you will see health information before a provider has talked with you about it. This kind of information can be easy to  misunderstand. To help you fully understand what it means for your health, we urge you to discuss this note with your provider.

## 2024-08-27 LAB
QT INTERVAL: 374 MS
QTC INTERVAL: 416 MS

## 2024-08-27 NOTE — DISCHARGE INSTRUCTIONS
Recommend following up with your primary care provider or cardiologist within the next week if symptoms persist.  Be sure to continue taking your medications as prescribed.  Return to the emergency department for worsening symptoms including persistent chest pain, difficulty breathing, weakness or other concerns

## 2024-09-16 ENCOUNTER — APPOINTMENT (OUTPATIENT)
Dept: CT IMAGING | Facility: HOSPITAL | Age: 59
End: 2024-09-16
Payer: MEDICARE

## 2024-09-16 ENCOUNTER — HOSPITAL ENCOUNTER (EMERGENCY)
Facility: HOSPITAL | Age: 59
Discharge: HOME | End: 2024-09-16
Admitting: EMERGENCY MEDICINE
Payer: MEDICARE

## 2024-09-16 VITALS
DIASTOLIC BLOOD PRESSURE: 72 MMHG | OXYGEN SATURATION: 98 % | TEMPERATURE: 98.2 F | SYSTOLIC BLOOD PRESSURE: 131 MMHG | HEIGHT: 63 IN | BODY MASS INDEX: 23.87 KG/M2 | RESPIRATION RATE: 16 BRPM | HEART RATE: 72 BPM | WEIGHT: 134.7 LBS

## 2024-09-16 DIAGNOSIS — H53.9 VISUAL DISTURBANCE: ICD-10-CM
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
## 2024-09-16 DIAGNOSIS — S09.90XA CLOSED HEAD INJURY, INITIAL ENCOUNTER: ICD-10-CM

## 2024-09-16 PROCEDURE — 99284 EMERGENCY DEPT VISIT MOD MDM: CPT

## 2024-09-16 PROCEDURE — 70450 CT HEAD/BRAIN W/O DYE: CPT

## 2024-10-21 ENCOUNTER — HOSPITAL ENCOUNTER (OUTPATIENT)
Facility: HOSPITAL | Age: 59
Discharge: HOME | End: 2024-10-21
Attending: EMERGENCY MEDICINE | Admitting: EMERGENCY MEDICINE
Payer: MEDICARE

## 2024-10-21 VITALS
RESPIRATION RATE: 18 BRPM | HEART RATE: 80 BPM | WEIGHT: 138.2 LBS | BODY MASS INDEX: 24.49 KG/M2 | OXYGEN SATURATION: 100 % | TEMPERATURE: 98.2 F | SYSTOLIC BLOOD PRESSURE: 124 MMHG | DIASTOLIC BLOOD PRESSURE: 70 MMHG | HEIGHT: 63 IN

## 2024-10-21 DIAGNOSIS — L03.119 CELLULITIS OF FOOT: ICD-10-CM

## 2024-10-21 DIAGNOSIS — L03.031 PARONYCHIA OF GREAT TOE OF RIGHT FOOT: Primary | ICD-10-CM

## 2024-10-21 PROCEDURE — G0463 HOSPITAL OUTPT CLINIC VISIT: HCPCS | Performed by: EMERGENCY MEDICINE

## 2024-10-21 RX ORDER — DOXYCYCLINE 100 MG/1
100 CAPSULE ORAL ONCE
Status: COMPLETED | OUTPATIENT
Start: 2024-10-21 | End: 2024-10-21

## 2024-10-21 RX ORDER — FLUCONAZOLE 150 MG/1
150 TABLET ORAL ONCE
Qty: 1 TABLET | Refills: 0 | Status: SHIPPED | OUTPATIENT
Start: 2024-10-21 | End: 2024-10-21

## 2024-10-21 RX ORDER — DOXYCYCLINE 100 MG/1
100 CAPSULE ORAL 2 TIMES DAILY
Qty: 20 CAPSULE | Refills: 0 | Status: SHIPPED | OUTPATIENT
Start: 2024-10-21 | End: 2024-10-31

## 2024-10-21 RX ORDER — ONDANSETRON 4 MG/1
4 TABLET, ORALLY DISINTEGRATING ORAL 4 TIMES DAILY PRN
Qty: 10 TABLET | Refills: 0 | Status: SHIPPED | OUTPATIENT
Start: 2024-10-21

## 2024-10-21 RX ADMIN — DOXYCYCLINE 100 MG: 100 CAPSULE ORAL at 11:35

## 2024-10-21 NOTE — FSED PROVIDER NOTE
Subjective   History of Present Illness  59-year-old female with history of diabetes presents complaining of right big toe injury last week that led to her pulling some of her toenail off which caused some bleeding inflammation on the right lateral side of the toe.  She says she subsequently developed some toe redness and top of the foot redness that hurts and is warm to the touch.  She does not have any fevers or bodyaches.  She says occasionally she will have nausea but she is not having any vomiting.  Review of Systems   Skin:  Positive for color change.       Past Medical History:   Diagnosis Date    CAD (coronary artery disease)     Diabetes     DVT (deep venous thrombosis)     Hypertension        Allergies   Allergen Reactions    Aspirin GI Intolerance and Other (See Comments)     Nausea and vomiting       Hydrocodone Itching       Past Surgical History:   Procedure Laterality Date    ANGIOPLASTY  09/08/2022    failed peripheral angioplasty by Dr Kurtz    ANGIOPLASTY ILIAC ARTERY Bilateral 9/9/2022    Procedure: BILATERAL COMMON ILIAC ARTERY ANGIOPLASTY AND STENT PLACEMENT, RIGHT EXTERNAL ILIAC ARTERY ANGIOPLASTY AND STENT, RIGHT EXTERNAL ILIAC ARTERY DRUG COATED BALLOON ANGIOPLASTY AND RIGHT LOWER EXTREMITY ANGIOGRAM;  Surgeon: Shirley Kurtz MD;  Location: [facility] MAIN OR;  Service: Vascular;  Laterality: Bilateral;    CARDIAC CATHETERIZATION N/A 9/8/2022    Procedure: Thrombolysis-peripheral;  Surgeon: Shirley Kurtz MD;  Location: [facility] CATH INVASIVE LOCATION;  Service: Cardiovascular;  Laterality: N/A;    ILIAC ARTERY STENT Bilateral     At [facility]    LEFT HEART CATH         Family History   Problem Relation Age of Onset    Diabetes type II Mother     Heart disease Mother     Stroke Mother     Hypertension Mother     High cholesterol Mother     No Known Problems Father     Diabetes type II Maternal Grandfather        Social History     Socioeconomic History    Marital status:      Number of children: 1    Highest education level: High school graduate   Tobacco Use    Smoking status: Former     Current packs/day: 0.75     Average packs/day: 0.8 packs/day for 36.0 years (27.0 ttl pk-yrs)     Types: Cigarettes    Smokeless tobacco: Never   Vaping Use    Vaping status: Never Used   Substance and Sexual Activity    Alcohol use: Not Currently     Comment: ONCE A YEAR    Drug use: Never    Sexual activity: Defer           Objective   Physical Exam  Nursing notes reviewed.  INITIAL VITAL SIGNS: Reviewed by me.  Normal heart rate, normal blood pressure, pulse ox normal  GENERAL: Alert. Well developed and well nourished. No respiratory distress.  HEAD: Normocephalic.   EYES: No conjunctival injection.  ENT: Oral mucosa is moist.  NECK/BACK: Supple. Full range of motion.  RESPIRATORY: Non-labored respirations.  EXTREMITIES: No deformity.  SKIN: Warm and dry.  Right great toe with lateral side paronychia the toe is red on that side the redness tracks at the top of the foot and the anterior portion of the distal right lower leg.  The red areas are tender to palpation there is some induration around the toe but not on the foot over the leg, there is no fluctuance there is no crepitus there is mild warmth.  NEUROLOGIC: Alert. Normal gait    Procedures           ED Course  ED Course as of 10/21/24 1133   Mon Oct 21, 2024   1129 Patient appears well in general has evidence of some cellulitis related to a paronychia.  Does not appear to be anything need to be drained.  She is a diabetic and has evidence of cellulitis related to the paronychia so I think she will need some antibiotics.  Given her normal vital signs and well appearance I do not think she needs laboratory work at this point.  Counseled her on hot soaks and follow-up with podiatry. [RO]      ED Course User Index  [RO] Mike Mims MD                                           Medical Decision Making  Problems Addressed:  Cellulitis of foot:  complicated acute illness or injury  Paronychia of great toe of right foot: complicated acute illness or injury    Risk  Prescription drug management.        Final diagnoses:   Paronychia of great toe of right foot   Cellulitis of foot       ED Disposition  ED Disposition       ED Disposition   Discharge    Condition   Good    Comment   --               [facility]  [address] [place] [id number] [phone]  Call   To schedule follow-up appointment         Medication List        New Prescriptions      doxycycline 100 MG capsule  Commonly known as: MONODOX  Take 1 capsule by mouth 2 (Two) Times a Day for 10 days.     ondansetron ODT 4 MG disintegrating tablet  Commonly known as: ZOFRAN-ODT  Place 1 tablet on the tongue 4 (Four) Times a Day As Needed for Nausea or Vomiting.               Where to Get Your Medications        These medications were sent to [email or web address][id number] [place] [address] AT [facility] - [phone] [facility] [phone] [address] [place] [address]      Phone: [phone]   doxycycline 100 MG capsule  ondansetron ODT 4 MG disintegrating tablet

## 2024-10-21 NOTE — DISCHARGE INSTRUCTIONS
Take medications as prescribed.  Soak your foot in warm water 5 times a day for 10 to 15 minutes at a time.  Call the number listed to set up next available appointment for follow-up with podiatry.  Please return to medical care if you develop progressively worsening symptoms despite antibiotics including but not limited to fevers, repetitive vomiting, significantly spreading redness.  Otherwise follow-up with podiatry as discussed.

## 2024-11-06 ENCOUNTER — TELEPHONE (OUTPATIENT)
Dept: ENDOCRINOLOGY | Facility: CLINIC | Age: 59
End: 2024-11-06
Payer: MEDICARE

## 2024-12-16 RX ORDER — EMPAGLIFLOZIN 25 MG/1
25 TABLET, FILM COATED ORAL DAILY
Qty: 90 TABLET | Refills: 0 | Status: SHIPPED | OUTPATIENT
Start: 2024-12-16

## 2025-03-17 RX ORDER — EMPAGLIFLOZIN 25 MG/1
25 TABLET, FILM COATED ORAL DAILY
Qty: 90 TABLET | Refills: 0 | OUTPATIENT
Start: 2025-03-17

## 2025-04-11 ENCOUNTER — APPOINTMENT (OUTPATIENT)
Dept: ULTRASOUND IMAGING | Facility: HOSPITAL | Age: 60
End: 2025-04-11
Payer: MEDICARE

## 2025-04-11 ENCOUNTER — HOSPITAL ENCOUNTER (EMERGENCY)
Facility: HOSPITAL | Age: 60
Discharge: HOME | End: 2025-04-11
Attending: EMERGENCY MEDICINE
Payer: MEDICARE

## 2025-04-11 VITALS
BODY MASS INDEX: 24.8 KG/M2 | TEMPERATURE: 97.5 F | SYSTOLIC BLOOD PRESSURE: 109 MMHG | DIASTOLIC BLOOD PRESSURE: 72 MMHG | WEIGHT: 140 LBS | RESPIRATION RATE: 18 BRPM | OXYGEN SATURATION: 100 % | HEART RATE: 76 BPM | HEIGHT: 63 IN

## 2025-04-11 DIAGNOSIS — S76.912A MUSCLE STRAIN OF LEFT THIGH, INITIAL ENCOUNTER: ICD-10-CM

## 2025-04-11 DIAGNOSIS — S86.811A STRAIN OF CALF MUSCLE, RIGHT, INITIAL ENCOUNTER: Primary | ICD-10-CM

## 2025-04-11 PROCEDURE — 99283 EMERGENCY DEPT VISIT LOW MDM: CPT

## 2025-04-11 PROCEDURE — 93971 EXTREMITY STUDY: CPT

## 2025-04-11 PROCEDURE — 99284 EMERGENCY DEPT VISIT MOD MDM: CPT

## 2025-04-11 RX ORDER — CYCLOBENZAPRINE HCL 5 MG
5 TABLET ORAL 3 TIMES DAILY PRN
Qty: 15 TABLET | Refills: 0 | Status: SHIPPED | OUTPATIENT
Start: 2025-04-11 | End: 2025-04-16

## 2025-04-11 RX ORDER — ACETAMINOPHEN 500 MG
1000 TABLET ORAL ONCE
Status: COMPLETED | OUTPATIENT
Start: 2025-04-11 | End: 2025-04-11

## 2025-04-11 RX ADMIN — ACETAMINOPHEN 1000 MG: 500 TABLET, FILM COATED ORAL at 16:20

## 2025-04-11 NOTE — DISCHARGE INSTRUCTIONS
Recommend Tylenol 500 mg 2-3 times daily to help with muscle pain    Take Flexeril muscle relaxer to help with cramps or spasms of your muscle    Recommend application of heat to sore muscles gentle stretch    Resume home medications    Follow-up with family doctor within the next week to discuss this ER visit return to ER for worsening symptom

## 2025-04-11 NOTE — FSED PROVIDER NOTE
Subjective   History of Present Illness  59-year-old female reports that she has been diagnosed with blood clots in the past after having an MRI.  She reports that she is on Plavix and Eliquis.  She reports that she woke up this morning with sudden onset pain to her right calf and her left thigh.  She denies injury or trauma.  She reports she called this facility and was told that she could not get an MRI but that she could get an ultrasound.  She is denying chest pain and shortness of breath.  She reports the last week she had pain to her right shoulder but that pain is gone.  She is requesting an ultrasound to make sure that she does not have blood clot in her leg.        Review of Systems   All other systems reviewed and are negative.      Past Medical History:   Diagnosis Date    CAD (coronary artery disease)     Diabetes     DVT (deep venous thrombosis)     Hypertension        Allergies   Allergen Reactions    Aspirin GI Intolerance and Other (See Comments)     Nausea and vomiting       Hydrocodone Itching       Past Surgical History:   Procedure Laterality Date    ANGIOPLASTY  09/08/2022    failed peripheral angioplasty by Dr Kurtz    ANGIOPLASTY ILIAC ARTERY Bilateral 9/9/2022    Procedure: BILATERAL COMMON ILIAC ARTERY ANGIOPLASTY AND STENT PLACEMENT, RIGHT EXTERNAL ILIAC ARTERY ANGIOPLASTY AND STENT, RIGHT EXTERNAL ILIAC ARTERY DRUG COATED BALLOON ANGIOPLASTY AND RIGHT LOWER EXTREMITY ANGIOGRAM;  Surgeon: Shirley Kurtz MD;  Location: [facility] MAIN OR;  Service: Vascular;  Laterality: Bilateral;    CARDIAC CATHETERIZATION N/A 9/8/2022    Procedure: Thrombolysis-peripheral;  Surgeon: Shirley Kurtz MD;  Location: [facility] CATH INVASIVE LOCATION;  Service: Cardiovascular;  Laterality: N/A;    ILIAC ARTERY STENT Bilateral     At [facility]    LEFT HEART CATH         Family History   Problem Relation Age of Onset    Diabetes type II Mother     Heart disease Mother     Stroke Mother      Hypertension Mother     High cholesterol Mother     No Known Problems Father     Diabetes type II Maternal Grandfather        Social History     Socioeconomic History    Marital status:     Number of children: 1    Highest education level: High school graduate   Tobacco Use    Smoking status: Former     Current packs/day: 0.75     Average packs/day: 0.8 packs/day for 36.0 years (27.0 ttl pk-yrs)     Types: Cigarettes    Smokeless tobacco: Never   Vaping Use    Vaping status: Never Used   Substance and Sexual Activity    Alcohol use: Not Currently     Comment: ONCE A YEAR    Drug use: Never    Sexual activity: Defer           Objective   Physical Exam  Vitals and nursing note reviewed.   Constitutional:       General: She is not in acute distress.     Appearance: Normal appearance. She is not toxic-appearing.   HENT:      Head: Normocephalic.      Nose: Nose normal.      Mouth/Throat:      Mouth: Mucous membranes are moist.      Pharynx: Oropharynx is clear.   Eyes:      Extraocular Movements: Extraocular movements intact.      Conjunctiva/sclera: Conjunctivae normal.      Pupils: Pupils are equal, round, and reactive to light.   Cardiovascular:      Rate and Rhythm: Normal rate.      Pulses: Normal pulses.   Pulmonary:      Effort: Pulmonary effort is normal. No respiratory distress.      Breath sounds: Normal breath sounds.   Abdominal:      General: Abdomen is flat. Bowel sounds are normal.      Palpations: Abdomen is soft.   Musculoskeletal:         General: Normal range of motion.      Cervical back: Normal range of motion and neck supple.      Comments: Patient's right calf.  There is no redness warmth or swelling to the right foot.  When I palpate the upper portion of the muscles of the right calf the musculature is somewhat tender on exam.   Skin:     General: Skin is warm.      Capillary Refill: Capillary refill takes less than 2 seconds.   Neurological:      General: No focal deficit present.       Mental Status: She is alert and oriented to person, place, and time. Mental status is at baseline.         Procedures           ED Course  ED Course as of 04/11/25 1757   Fri Apr 11, 2025   1603 Venous Doppler left lower extremity  Impression:  No definite evidence for deep venous thrombosis in the visualized deep venous system of the left lower extremity.   [WF]   1604 Venous Doppler right lower extremity  Impression:  No evidence of right lower extremity deep venous thrombosis.   [WF]      ED Course User Index  [WF] Pedro Lui Jr., APRN                                           Medical Decision Making  Venous Doppler ultrasound ordered of the right lower extremity and the left thigh.    Venous Doppler of the right calf and the left thigh is negative for DVT.  Again patient is on Xarelto.    Discussed possibility of muscle strain.  Patient is agreeable to discharge home taking over-the-counter Tylenol and will add muscle relaxer Flexeril.    Problems Addressed:  Muscle strain of left thigh, initial encounter: complicated acute illness or injury  Strain of calf muscle, right, initial encounter: complicated acute illness or injury    Risk  OTC drugs.  Prescription drug management.        Final diagnoses:   Strain of calf muscle, right, initial encounter   Muscle strain of left thigh, initial encounter       ED Disposition  ED Disposition       ED Disposition   Discharge    Condition   Stable    Comment   --               Collin Low, APRN  [address] [facility] [place] [id number] [phone]               Medication List        New Prescriptions      cyclobenzaprine 5 MG tablet  Commonly known as: FLEXERIL  Take 1 tablet by mouth 3 (Three) Times a Day As Needed for Muscle Spasms for up to 5 days.               Where to Get Your Medications        These medications were sent to [facility] [id number] [place] [address] [address] [facility] [address] - [phone] [place] [phone] FX   [address] [place] [facility]      Phone: [phone]   cyclobenzaprine 5 MG tablet

## 2025-05-22 ENCOUNTER — APPOINTMENT (OUTPATIENT)
Dept: CT IMAGING | Facility: HOSPITAL | Age: 60
End: 2025-05-22
Payer: MEDICARE

## 2025-05-22 ENCOUNTER — APPOINTMENT (OUTPATIENT)
Dept: GENERAL RADIOLOGY | Facility: HOSPITAL | Age: 60
End: 2025-05-22
Payer: MEDICARE

## 2025-05-22 ENCOUNTER — HOSPITAL ENCOUNTER (OUTPATIENT)
Facility: HOSPITAL | Age: 60
Setting detail: OBSERVATION
LOS: 1 days | Discharge: HOME | End: 2025-05-23
Attending: EMERGENCY MEDICINE | Admitting: STUDENT IN AN ORGANIZED HEALTH CARE EDUCATION/TRAINING PROGRAM
Payer: MEDICARE

## 2025-05-22 DIAGNOSIS — S32.000A LUMBAR COMPRESSION FRACTURE, CLOSED, INITIAL ENCOUNTER: ICD-10-CM

## 2025-05-22 DIAGNOSIS — R07.9 CHEST PAIN, UNSPECIFIED TYPE: ICD-10-CM

## 2025-05-22 DIAGNOSIS — S32.010A COMPRESSION FRACTURE OF L1 VERTEBRA, INITIAL ENCOUNTER: Primary | ICD-10-CM

## 2025-05-22 DIAGNOSIS — S09.90XA INJURY OF HEAD, INITIAL ENCOUNTER: ICD-10-CM

## 2025-05-22 LAB
ANION GAP SERPL CALCULATED.3IONS-SCNC: 11.8 MMOL/L (ref 5–15)
B PARAPERT DNA SPEC QL NAA+PROBE: NOT DETECTED
B PERT DNA SPEC QL NAA+PROBE: NOT DETECTED
BASOPHILS # BLD AUTO: 0.15 10*3/MM3 (ref 0–0.2)
BASOPHILS NFR BLD AUTO: 0.8 % (ref 0–1.5)
BUN SERPL-MCNC: 17 MG/DL (ref 8–23)
BUN/CREAT SERPL: 22.1 (ref 7–25)
C PNEUM DNA NPH QL NAA+NON-PROBE: NOT DETECTED
CALCIUM SPEC-SCNC: 10.2 MG/DL (ref 8.6–10.5)
CHLORIDE SERPL-SCNC: 106 MMOL/L (ref 98–107)
CO2 SERPL-SCNC: 25.2 MMOL/L (ref 22–29)
CREAT SERPL-MCNC: 0.77 MG/DL (ref 0.57–1)
DEPRECATED RDW RBC AUTO: 44.1 FL (ref 37–54)
EGFRCR SERPLBLD CKD-EPI 2021: 88.4 ML/MIN/1.73
EOSINOPHIL # BLD AUTO: 0.27 10*3/MM3 (ref 0–0.4)
EOSINOPHIL NFR BLD AUTO: 1.5 % (ref 0.3–6.2)
ERYTHROCYTE [DISTWIDTH] IN BLOOD BY AUTOMATED COUNT: 13 % (ref 12.3–15.4)
FLUAV SUBTYP SPEC NAA+PROBE: NOT DETECTED
FLUBV RNA ISLT QL NAA+PROBE: NOT DETECTED
GEN 5 1HR TROPONIN T REFLEX: 10 NG/L
GLUCOSE BLDC GLUCOMTR-MCNC: 134 MG/DL (ref 70–105)
GLUCOSE BLDC GLUCOMTR-MCNC: 214 MG/DL (ref 70–105)
GLUCOSE SERPL-MCNC: 194 MG/DL (ref 65–99)
HADV DNA SPEC NAA+PROBE: NOT DETECTED
HBA1C MFR BLD: 6.62 % (ref 4.8–5.6)
HCOV 229E RNA SPEC QL NAA+PROBE: NOT DETECTED
HCOV HKU1 RNA SPEC QL NAA+PROBE: NOT DETECTED
HCOV NL63 RNA SPEC QL NAA+PROBE: NOT DETECTED
HCOV OC43 RNA SPEC QL NAA+PROBE: NOT DETECTED
HCT VFR BLD AUTO: 53.1 % (ref 34–46.6)
HGB BLD-MCNC: 16.6 G/DL (ref 12–15.9)
HMPV RNA NPH QL NAA+NON-PROBE: NOT DETECTED
HOLD SPECIMEN: NORMAL
HOLD SPECIMEN: NORMAL
HPIV1 RNA ISLT QL NAA+PROBE: NOT DETECTED
HPIV2 RNA SPEC QL NAA+PROBE: NOT DETECTED
HPIV3 RNA NPH QL NAA+PROBE: NOT DETECTED
HPIV4 P GENE NPH QL NAA+PROBE: NOT DETECTED
IMM GRANULOCYTES # BLD AUTO: 0.19 10*3/MM3 (ref 0–0.05)
IMM GRANULOCYTES NFR BLD AUTO: 1 % (ref 0–0.5)
LYMPHOCYTES # BLD AUTO: 3.99 10*3/MM3 (ref 0.7–3.1)
LYMPHOCYTES NFR BLD AUTO: 21.5 % (ref 19.6–45.3)
M PNEUMO IGG SER IA-ACNC: NOT DETECTED
MCH RBC QN AUTO: 28.6 PG (ref 26.6–33)
MCHC RBC AUTO-ENTMCNC: 31.3 G/DL (ref 31.5–35.7)
MCV RBC AUTO: 91.4 FL (ref 79–97)
MONOCYTES # BLD AUTO: 0.84 10*3/MM3 (ref 0.1–0.9)
MONOCYTES NFR BLD AUTO: 4.5 % (ref 5–12)
NEUTROPHILS NFR BLD AUTO: 13.11 10*3/MM3 (ref 1.7–7)
NEUTROPHILS NFR BLD AUTO: 70.7 % (ref 42.7–76)
NRBC BLD AUTO-RTO: 0 /100 WBC (ref 0–0.2)
PLATELET # BLD AUTO: 321 10*3/MM3 (ref 140–450)
PMV BLD AUTO: 10.1 FL (ref 6–12)
POTASSIUM SERPL-SCNC: 4.3 MMOL/L (ref 3.5–5.2)
RBC # BLD AUTO: 5.81 10*6/MM3 (ref 3.77–5.28)
RHINOVIRUS RNA SPEC NAA+PROBE: NOT DETECTED
RSV RNA NPH QL NAA+NON-PROBE: NOT DETECTED
SARS-COV-2 RNA RESP QL NAA+PROBE: NOT DETECTED
SODIUM SERPL-SCNC: 143 MMOL/L (ref 136–145)
TROPONIN T NUMERIC DELTA: -2 NG/L
TROPONIN T SERPL HS-MCNC: 12 NG/L
WBC NRBC COR # BLD AUTO: 18.55 10*3/MM3 (ref 3.4–10.8)
WHOLE BLOOD HOLD COAG: NORMAL
WHOLE BLOOD HOLD SPECIMEN: NORMAL

## 2025-05-22 PROCEDURE — 96376 TX/PRO/DX INJ SAME DRUG ADON: CPT

## 2025-05-22 PROCEDURE — 83036 HEMOGLOBIN GLYCOSYLATED A1C: CPT | Performed by: NURSE PRACTITIONER

## 2025-05-22 PROCEDURE — 99285 EMERGENCY DEPT VISIT HI MDM: CPT

## 2025-05-22 PROCEDURE — 25010000002 ONDANSETRON PER 1 MG

## 2025-05-22 PROCEDURE — G0378 HOSPITAL OBSERVATION PER HR: HCPCS

## 2025-05-22 PROCEDURE — 71045 X-RAY EXAM CHEST 1 VIEW: CPT

## 2025-05-22 PROCEDURE — 85025 COMPLETE CBC W/AUTO DIFF WBC: CPT | Performed by: EMERGENCY MEDICINE

## 2025-05-22 PROCEDURE — 99221 1ST HOSP IP/OBS SF/LOW 40: CPT | Performed by: NURSE PRACTITIONER

## 2025-05-22 PROCEDURE — 0202U NFCT DS 22 TRGT SARS-COV-2: CPT | Performed by: STUDENT IN AN ORGANIZED HEALTH CARE EDUCATION/TRAINING PROGRAM

## 2025-05-22 PROCEDURE — 72100 X-RAY EXAM L-S SPINE 2/3 VWS: CPT

## 2025-05-22 PROCEDURE — 72131 CT LUMBAR SPINE W/O DYE: CPT

## 2025-05-22 PROCEDURE — 96374 THER/PROPH/DIAG INJ IV PUSH: CPT

## 2025-05-22 PROCEDURE — 36415 COLL VENOUS BLD VENIPUNCTURE: CPT

## 2025-05-22 PROCEDURE — 25010000002 HYDROMORPHONE PER 4 MG: Performed by: NURSE PRACTITIONER

## 2025-05-22 PROCEDURE — 70450 CT HEAD/BRAIN W/O DYE: CPT

## 2025-05-22 PROCEDURE — 84484 ASSAY OF TROPONIN QUANT: CPT | Performed by: EMERGENCY MEDICINE

## 2025-05-22 PROCEDURE — 96375 TX/PRO/DX INJ NEW DRUG ADDON: CPT

## 2025-05-22 PROCEDURE — 82948 REAGENT STRIP/BLOOD GLUCOSE: CPT

## 2025-05-22 PROCEDURE — 25010000002 PROCHLORPERAZINE 10 MG/2ML SOLUTION

## 2025-05-22 PROCEDURE — 93005 ELECTROCARDIOGRAM TRACING: CPT | Performed by: EMERGENCY MEDICINE

## 2025-05-22 PROCEDURE — 25010000002 ONDANSETRON PER 1 MG: Performed by: EMERGENCY MEDICINE

## 2025-05-22 PROCEDURE — 80048 BASIC METABOLIC PNL TOTAL CA: CPT | Performed by: EMERGENCY MEDICINE

## 2025-05-22 PROCEDURE — 25010000002 HYDROMORPHONE PER 4 MG: Performed by: EMERGENCY MEDICINE

## 2025-05-22 RX ORDER — POLYETHYLENE GLYCOL 3350 17 G/17G
17 POWDER, FOR SOLUTION ORAL DAILY PRN
Status: DISCONTINUED | OUTPATIENT
Start: 2025-05-22 | End: 2025-05-23 | Stop reason: HOSPADM

## 2025-05-22 RX ORDER — BISACODYL 10 MG
10 SUPPOSITORY, RECTAL RECTAL DAILY PRN
Status: DISCONTINUED | OUTPATIENT
Start: 2025-05-22 | End: 2025-05-23 | Stop reason: HOSPADM

## 2025-05-22 RX ORDER — SCOPOLAMINE 1 MG/3D
1 PATCH, EXTENDED RELEASE TRANSDERMAL
Status: DISCONTINUED | OUTPATIENT
Start: 2025-05-22 | End: 2025-05-23 | Stop reason: HOSPADM

## 2025-05-22 RX ORDER — HYDROMORPHONE HYDROCHLORIDE 1 MG/ML
0.5 INJECTION, SOLUTION INTRAMUSCULAR; INTRAVENOUS; SUBCUTANEOUS ONCE
Refills: 0 | Status: COMPLETED | OUTPATIENT
Start: 2025-05-22 | End: 2025-05-22

## 2025-05-22 RX ORDER — DULOXETIN HYDROCHLORIDE 20 MG/1
40 CAPSULE, DELAYED RELEASE ORAL DAILY
Status: DISCONTINUED | OUTPATIENT
Start: 2025-05-22 | End: 2025-05-23 | Stop reason: HOSPADM

## 2025-05-22 RX ORDER — SODIUM CHLORIDE 9 MG/ML
40 INJECTION, SOLUTION INTRAVENOUS AS NEEDED
Status: DISCONTINUED | OUTPATIENT
Start: 2025-05-22 | End: 2025-05-23 | Stop reason: HOSPADM

## 2025-05-22 RX ORDER — CLOPIDOGREL BISULFATE 75 MG/1
75 TABLET ORAL DAILY
Status: DISCONTINUED | OUTPATIENT
Start: 2025-05-22 | End: 2025-05-23 | Stop reason: HOSPADM

## 2025-05-22 RX ORDER — ONDANSETRON 2 MG/ML
4 INJECTION INTRAMUSCULAR; INTRAVENOUS ONCE
Status: COMPLETED | OUTPATIENT
Start: 2025-05-22 | End: 2025-05-22

## 2025-05-22 RX ORDER — INSULIN LISPRO 100 [IU]/ML
2-7 INJECTION, SOLUTION INTRAVENOUS; SUBCUTANEOUS
Status: DISCONTINUED | OUTPATIENT
Start: 2025-05-22 | End: 2025-05-23 | Stop reason: HOSPADM

## 2025-05-22 RX ORDER — ISOSORBIDE MONONITRATE 60 MG/1
60 TABLET, EXTENDED RELEASE ORAL DAILY
Status: DISCONTINUED | OUTPATIENT
Start: 2025-05-22 | End: 2025-05-23 | Stop reason: HOSPADM

## 2025-05-22 RX ORDER — ATORVASTATIN CALCIUM 40 MG/1
80 TABLET, FILM COATED ORAL DAILY
Status: DISCONTINUED | OUTPATIENT
Start: 2025-05-22 | End: 2025-05-23 | Stop reason: HOSPADM

## 2025-05-22 RX ORDER — PROCHLORPERAZINE EDISYLATE 5 MG/ML
10 INJECTION INTRAMUSCULAR; INTRAVENOUS EVERY 6 HOURS PRN
Status: DISCONTINUED | OUTPATIENT
Start: 2025-05-22 | End: 2025-05-23 | Stop reason: HOSPADM

## 2025-05-22 RX ORDER — NICOTINE POLACRILEX 4 MG
15 LOZENGE BUCCAL
Status: DISCONTINUED | OUTPATIENT
Start: 2025-05-22 | End: 2025-05-23 | Stop reason: HOSPADM

## 2025-05-22 RX ORDER — SEMAGLUTIDE 2.68 MG/ML
1 INJECTION, SOLUTION SUBCUTANEOUS WEEKLY
COMMUNITY
End: 2025-05-27

## 2025-05-22 RX ORDER — METOPROLOL TARTRATE 50 MG
100 TABLET ORAL 2 TIMES DAILY
Status: DISCONTINUED | OUTPATIENT
Start: 2025-05-22 | End: 2025-05-23 | Stop reason: HOSPADM

## 2025-05-22 RX ORDER — IBUPROFEN 600 MG/1
1 TABLET ORAL
Status: DISCONTINUED | OUTPATIENT
Start: 2025-05-22 | End: 2025-05-23 | Stop reason: HOSPADM

## 2025-05-22 RX ORDER — SODIUM CHLORIDE 0.9 % (FLUSH) 0.9 %
10 SYRINGE (ML) INJECTION AS NEEDED
Status: DISCONTINUED | OUTPATIENT
Start: 2025-05-22 | End: 2025-05-23 | Stop reason: HOSPADM

## 2025-05-22 RX ORDER — AMLODIPINE BESYLATE 5 MG/1
5 TABLET ORAL
Status: DISCONTINUED | OUTPATIENT
Start: 2025-05-22 | End: 2025-05-23 | Stop reason: HOSPADM

## 2025-05-22 RX ORDER — AMOXICILLIN 250 MG
2 CAPSULE ORAL 2 TIMES DAILY PRN
Status: DISCONTINUED | OUTPATIENT
Start: 2025-05-22 | End: 2025-05-23 | Stop reason: HOSPADM

## 2025-05-22 RX ORDER — HYDROMORPHONE HYDROCHLORIDE 1 MG/ML
0.5 INJECTION, SOLUTION INTRAMUSCULAR; INTRAVENOUS; SUBCUTANEOUS
Status: DISCONTINUED | OUTPATIENT
Start: 2025-05-22 | End: 2025-05-23 | Stop reason: HOSPADM

## 2025-05-22 RX ORDER — BISACODYL 5 MG/1
5 TABLET, DELAYED RELEASE ORAL DAILY PRN
Status: DISCONTINUED | OUTPATIENT
Start: 2025-05-22 | End: 2025-05-23 | Stop reason: HOSPADM

## 2025-05-22 RX ORDER — ONDANSETRON 2 MG/ML
4 INJECTION INTRAMUSCULAR; INTRAVENOUS EVERY 6 HOURS PRN
Status: DISCONTINUED | OUTPATIENT
Start: 2025-05-22 | End: 2025-05-23 | Stop reason: HOSPADM

## 2025-05-22 RX ORDER — SODIUM CHLORIDE 0.9 % (FLUSH) 0.9 %
10 SYRINGE (ML) INJECTION EVERY 12 HOURS SCHEDULED
Status: DISCONTINUED | OUTPATIENT
Start: 2025-05-22 | End: 2025-05-23 | Stop reason: HOSPADM

## 2025-05-22 RX ORDER — METOPROLOL TARTRATE 100 MG/1
100 TABLET ORAL 2 TIMES DAILY
COMMUNITY

## 2025-05-22 RX ORDER — DULOXETINE 40 MG/1
40 CAPSULE, DELAYED RELEASE ORAL DAILY
COMMUNITY

## 2025-05-22 RX ORDER — BUPROPION HYDROCHLORIDE 150 MG/1
300 TABLET ORAL DAILY
Status: DISCONTINUED | OUTPATIENT
Start: 2025-05-22 | End: 2025-05-23 | Stop reason: HOSPADM

## 2025-05-22 RX ORDER — DEXTROSE MONOHYDRATE 25 G/50ML
25 INJECTION, SOLUTION INTRAVENOUS
Status: DISCONTINUED | OUTPATIENT
Start: 2025-05-22 | End: 2025-05-23 | Stop reason: HOSPADM

## 2025-05-22 RX ORDER — HYDRALAZINE HYDROCHLORIDE 20 MG/ML
10 INJECTION INTRAMUSCULAR; INTRAVENOUS EVERY 6 HOURS PRN
Status: DISCONTINUED | OUTPATIENT
Start: 2025-05-22 | End: 2025-05-23 | Stop reason: HOSPADM

## 2025-05-22 RX ORDER — METOPROLOL TARTRATE 1 MG/ML
2.5 INJECTION, SOLUTION INTRAVENOUS EVERY 6 HOURS PRN
Status: DISCONTINUED | OUTPATIENT
Start: 2025-05-22 | End: 2025-05-22

## 2025-05-22 RX ADMIN — ONDANSETRON 4 MG: 2 INJECTION, SOLUTION INTRAMUSCULAR; INTRAVENOUS at 06:02

## 2025-05-22 RX ADMIN — HYDROMORPHONE HYDROCHLORIDE 0.5 MG: 1 INJECTION, SOLUTION INTRAMUSCULAR; INTRAVENOUS; SUBCUTANEOUS at 12:37

## 2025-05-22 RX ADMIN — PROCHLORPERAZINE EDISYLATE 10 MG: 5 INJECTION INTRAMUSCULAR; INTRAVENOUS at 07:53

## 2025-05-22 RX ADMIN — HYDROMORPHONE HYDROCHLORIDE 0.5 MG: 1 INJECTION, SOLUTION INTRAMUSCULAR; INTRAVENOUS; SUBCUTANEOUS at 09:39

## 2025-05-22 RX ADMIN — APIXABAN 2.5 MG: 2.5 TABLET, FILM COATED ORAL at 21:16

## 2025-05-22 RX ADMIN — DULOXETINE 40 MG: 20 CAPSULE, DELAYED RELEASE ORAL at 15:48

## 2025-05-22 RX ADMIN — HYDROMORPHONE HYDROCHLORIDE 0.5 MG: 1 INJECTION, SOLUTION INTRAMUSCULAR; INTRAVENOUS; SUBCUTANEOUS at 16:56

## 2025-05-22 RX ADMIN — HYDROMORPHONE HYDROCHLORIDE 0.5 MG: 1 INJECTION, SOLUTION INTRAMUSCULAR; INTRAVENOUS; SUBCUTANEOUS at 21:16

## 2025-05-22 RX ADMIN — METOPROLOL TARTRATE 2.5 MG: 5 INJECTION INTRAVENOUS at 12:37

## 2025-05-22 RX ADMIN — Medication 10 ML: at 09:39

## 2025-05-22 RX ADMIN — BUPROPION HYDROCHLORIDE 300 MG: 150 TABLET, EXTENDED RELEASE ORAL at 15:30

## 2025-05-22 RX ADMIN — ATORVASTATIN CALCIUM 80 MG: 40 TABLET, FILM COATED ORAL at 15:30

## 2025-05-22 RX ADMIN — HYDROMORPHONE HYDROCHLORIDE 0.5 MG: 1 INJECTION, SOLUTION INTRAMUSCULAR; INTRAVENOUS; SUBCUTANEOUS at 06:12

## 2025-05-22 RX ADMIN — CLOPIDOGREL BISULFATE 75 MG: 75 TABLET, FILM COATED ORAL at 15:30

## 2025-05-22 RX ADMIN — ONDANSETRON 4 MG: 2 INJECTION, SOLUTION INTRAMUSCULAR; INTRAVENOUS at 16:59

## 2025-05-22 RX ADMIN — ISOSORBIDE MONONITRATE 60 MG: 30 TABLET, EXTENDED RELEASE ORAL at 15:30

## 2025-05-22 RX ADMIN — ONDANSETRON 4 MG: 2 INJECTION, SOLUTION INTRAMUSCULAR; INTRAVENOUS at 05:15

## 2025-05-22 RX ADMIN — SCOPOLAMINE 1 PATCH: 1.5 PATCH, EXTENDED RELEASE TRANSDERMAL at 11:55

## 2025-05-22 RX ADMIN — METOPROLOL TARTRATE 100 MG: 50 TABLET, FILM COATED ORAL at 21:16

## 2025-05-22 RX ADMIN — EMPAGLIFLOZIN 10 MG: 10 TABLET, FILM COATED ORAL at 15:30

## 2025-05-22 RX ADMIN — PROCHLORPERAZINE EDISYLATE 10 MG: 5 INJECTION INTRAMUSCULAR; INTRAVENOUS at 21:16

## 2025-05-22 RX ADMIN — Medication 10 ML: at 21:25

## 2025-05-22 RX ADMIN — AMLODIPINE BESYLATE 5 MG: 5 TABLET ORAL at 15:30

## 2025-05-22 NOTE — CONSULTS
[facility] NEUROSURGERY CONSULT NOTE    Patient name: Blossom Martin  Referring Provider:     Aline Gerardo APRN      Reason for Consultation: L1 fracture    Patient Care Team:  Collin Low APRN as PCP - General    Chief complaint: Back pain    Subjective .     History of present illness:    Patient is a 59 y.o. right handed female who presented to [facility] emergency department on 5/22/2025 after a fall hitting her head when she got out of bed this morning.  She had no loss of consciousness.  She had resultant lower back pain and some nausea and vomiting.  Lumbar x-rays demonstrated age-indeterminate wedge compression deformity of L1 and neurosurgery was consulted for further evaluation/and offer recommendations.  Upon my evaluation patient is awake alert and oriented in bed.  She is moving all extremities.  She endorses focal back pain midline to palpation and across to her back worse with movement.  She says the pain is so bad it makes her nauseous.  She reports no acute radicular pain or radiculopathy symptoms.  She reports no bowel or bladder dysfunction or saddle anesthesia.    Review of Systems  Review of Systems   Gastrointestinal:  Positive for nausea.   Musculoskeletal:  Positive for back pain.   All other systems reviewed and are negative.      History  PAST MEDICAL HISTORY  Past Medical History:   Diagnosis Date    CAD (coronary artery disease)     Diabetes     DVT (deep venous thrombosis)     Hypertension        PAST SURGICAL HISTORY  Past Surgical History:   Procedure Laterality Date    ANGIOPLASTY  09/08/2022    failed peripheral angioplasty by Dr Kurtz    ANGIOPLASTY ILIAC ARTERY Bilateral 9/9/2022    Procedure: BILATERAL COMMON ILIAC ARTERY ANGIOPLASTY AND STENT PLACEMENT, RIGHT EXTERNAL ILIAC ARTERY ANGIOPLASTY AND STENT, RIGHT EXTERNAL ILIAC ARTERY DRUG COATED BALLOON ANGIOPLASTY AND RIGHT LOWER EXTREMITY ANGIOGRAM;  Surgeon: Shirley Kurtz MD;  Location: [facility]  OR;  Service: Vascular;  Laterality: Bilateral;    CARDIAC CATHETERIZATION N/A 9/8/2022    Procedure: Thrombolysis-peripheral;  Surgeon: Shirley Kurtz MD;  Location: [facility] CATH INVASIVE LOCATION;  Service: Cardiovascular;  Laterality: N/A;    ILIAC ARTERY STENT Bilateral     At [facility]    LEFT HEART CATH         FAMILY HISTORY  Family History   Problem Relation Age of Onset    Diabetes type II Mother     Heart disease Mother     Stroke Mother     Hypertension Mother     High cholesterol Mother     No Known Problems Father     Diabetes type II Maternal Grandfather        SOCIAL HISTORY  Social History     Tobacco Use    Smoking status: Former     Current packs/day: 0.75     Average packs/day: 0.8 packs/day for 36.0 years (27.0 ttl pk-yrs)     Types: Cigarettes    Smokeless tobacco: Never   Vaping Use    Vaping status: Never Used   Substance Use Topics    Alcohol use: Not Currently     Comment: ONCE A YEAR    Drug use: Never       unemployed, disabled      Allergies:  Aspirin and Hydrocodone    MEDICATIONS:  (Not in a hospital admission)        Current Facility-Administered Medications:     sennosides-docusate (PERICOLACE) 8.6-50 MG per tablet 2 tablet, 2 tablet, Oral, BID PRN **AND** polyethylene glycol (MIRALAX) packet 17 g, 17 g, Oral, Daily PRN **AND** bisacodyl (DULCOLAX) EC tablet 5 mg, 5 mg, Oral, Daily PRN **AND** bisacodyl (DULCOLAX) suppository 10 mg, 10 mg, Rectal, Daily PRN, GriteClifton fryee P, APRN    Calcium Replacement - Follow Nurse / BPA Driven Protocol, , Not Applicable, PRN, SantoiteKenisha fryeAline P, APRN    HYDROmorphone (DILAUDID) injection 0.5 mg, 0.5 mg, Intravenous, Q2H PRN, Clifton Gerardoe P, APRN, 0.5 mg at 05/22/25 1237    Magnesium Standard Dose Replacement - Follow Nurse / BPA Driven Protocol, , Not Applicable, PRN, SantoiteKenisha fryeAline P, APRN    metoprolol tartrate (LOPRESSOR) injection 2.5 mg, 2.5 mg, Intravenous, Q6H PRN, SantoiteClifton fryee P, APRN, 2.5 mg at 05/22/25  1237    ondansetron (ZOFRAN) injection 4 mg, 4 mg, Intravenous, Q6H PRN, Yonathan Lang MD    Phosphorus Replacement - Follow Nurse / BPA Driven Protocol, , Not Applicable, PRN, Aline Gerardo P, APRN    Potassium Replacement - Follow Nurse / BPA Driven Protocol, , Not Applicable, PRN, GritenKenishaAline P, APRN    prochlorperazine (COMPAZINE) injection 10 mg, 10 mg, Intravenous, Q6H PRN, Yonathan Lang MD, 10 mg at 05/22/25 0753    scopolamine patch 1 mg/72 hr, 1 patch, Transdermal, Q72H, Hugo Delgado MD, 1 patch at 05/22/25 1155    sodium chloride 0.9 % flush 10 mL, 10 mL, Intravenous, PRN, Micheal Leal MD    sodium chloride 0.9 % flush 10 mL, 10 mL, Intravenous, Q12H, GriteAline frye P, APRN, 10 mL at 05/22/25 0939    sodium chloride 0.9 % flush 10 mL, 10 mL, Intravenous, PRN, Griten, Aline P, APRN    sodium chloride 0.9 % infusion 40 mL, 40 mL, Intravenous, PRN, Clifton Gerardoe P, APRN    Current Outpatient Medications:     amLODIPine (NORVASC) 5 MG tablet, Take 1 tablet by mouth Daily., Disp: 30 tablet, Rfl: 2    apixaban (ELIQUIS) 2.5 MG tablet tablet, Take 1 tablet by mouth 2 (Two) Times a Day., Disp: 12 tablet, Rfl: 0    atorvastatin (LIPITOR) 80 MG tablet, Take 1 tablet by mouth Daily., Disp: , Rfl:     buPROPion XL (WELLBUTRIN XL) 300 MG 24 hr tablet, Take 1 tablet by mouth Every Morning., Disp: , Rfl:     clopidogrel (PLAVIX) 75 MG tablet, Take 1 tablet by mouth Daily., Disp: 30 tablet, Rfl: 2    DULoxetine HCl 40 MG capsule delayed-release particles, Take 1 capsule by mouth Daily., Disp: , Rfl:     empagliflozin (JARDIANCE) 10 MG tablet tablet, Take 1 tablet by mouth Daily., Disp: , Rfl:     isosorbide mononitrate (IMDUR) 60 MG 24 hr tablet, Take 1 tablet by mouth Daily., Disp: , Rfl:     metoprolol tartrate (LOPRESSOR) 100 MG tablet, Take 1 tablet by mouth 2 (Two) Times a Day., Disp: , Rfl:     Semaglutide, 2 MG/DOSE, (Ozempic, 2 MG/DOSE,) 8 MG/3ML solution pen-injector, Inject 1 mg  under the skin into the appropriate area as directed 1 (One) Time Per Week., Disp: , Rfl:     ondansetron ODT (ZOFRAN-ODT) 4 MG disintegrating tablet, Place 1 tablet on the tongue 4 (Four) Times a Day As Needed for Nausea or Vomiting., Disp: 10 tablet, Rfl: 0      Objective     Results Review:  LABS:  Results from last 7 days   Lab Units 05/22/25  0512   WBC 10*3/mm3 18.55*   HEMOGLOBIN g/dL 16.6*   HEMATOCRIT % 53.1*   PLATELETS 10*3/mm3 321     Results from last 7 days   Lab Units 05/22/25  0512   SODIUM mmol/L 143   POTASSIUM mmol/L 4.3   CHLORIDE mmol/L 106   CO2 mmol/L 25.2   BUN mg/dL 17   CREATININE mg/dL 0.77   CALCIUM mg/dL 10.2   GLUCOSE mg/dL 194*         DIAGNOSTICS:  Lumbar x-ray lumbar x-rays    Results Review:   I reviewed the patient's new clinical results.  I personally viewed and interpreted the patient's lumbar x-rays showing age indeterminate anterior wedge compression deformity along L1 with about 30% loss of vertebral height.  Alignment appears to be okay.  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    Vital Signs   Temp:  [98 °F (36.7 °C)-98.1 °F (36.7 °C)] 98 °F (36.7 °C)  Heart Rate:  [74-87] 87  Resp:  [18-20] 18  BP: (157-182)/(86-92) 182/89    Physical Exam:  Physical Exam  Neurological Exam  Focal tenderness midline lumbar spine    Assessment & Plan       Lumbar compression fracture, closed, initial encounter      Problem List Items Addressed This Visit    None  Visit Diagnoses         Compression fracture of L1 vertebra, initial encounter    -  Primary      Injury of head, initial encounter          Chest pain, unspecified type                 COMORBID CONDITIONS:  Diabetes Type 2 and Hypertension  Assessment: Patient 59-year-old female with ground-level fall and resultant back pain.  There is radiographic evidence of wedge compression deformity along L1 that is age-indeterminate but new finding when  compared to imaging in 2023.  Her clinical presentation concordant with imaging.  Patient has no radicular pain or radiculopathy symptoms.  Would like to obtain CT lumbar spine to get a better look at the fracture.  Will also order TLSO brace.  Will follow-up after CT scan for any further recommendations with medical management likely.    I informed patient that typically vertebral compression fractures heal in approximately 12 weeks.  Patients with osteopenia/osteoporosis may take somewhat longer due to underlying bone density issues.  Pain typically subsides on the average of 4 to 6 weeks with a range of 2 to 12 weeks.  Stable vertebral compression fractures are treated initially with analgesics, muscle relaxers and restricting activity with progressive mobilization.  It is recommended that patient limit lifting, pushing, pulling of no more than 5 pounds, with no no bending or twisting.  No exertional impact activity-walking is okay.  Physical therapy will add value to patient's recovery.  And strongly encouraged.  A TLSO brace has been ordered due to diagnosis of L1 from.  The purpose of the brace is to support weak muscles, stabilize and restrict movement of the trunk to aid in healing and pain relief of (fracture/ postop).Wear brace when sitting higher than 45 degrees, standing, walking, and when riding in a car.    I am recommending serial imaging in approximately 6 weeks to rule out progressive deformity.      PLAN:     L1 compression fracture    - CT lumbar spine  - TLSO brace  - Pain management per primary  - If CT lumbar spine demonstrates stability, follow-up in 6 weeks with surveillance imaging to rule out progressive deformity  - Please call for any questions or concerns    I discussed the patient's findings and my recommendations with patient and Dr. Richardson    During patient visit,Appropriate PPE was worn during the entire visit.  Hand hygiene was completed before and after.     Martha Garcia, TITUS,  "APRN  05/22/25  13:07 EDT    \"Dictated utilizing Dragon dictation\".      "

## 2025-05-22 NOTE — H&P
[facility] Medicine Services  History & Physical    Patient Name: Blossom Martin  : 1965  MRN: [id number]  Primary Care Physician:  Collin Low APRN  Date of admission: 2025  Date and Time of Service: 2025 at 9:05 AM EDT      Subjective      Chief Complaint: Low back pain    History of Present Illness: Blossom Martin is a 59 y.o. female with a CMH of coronary artery disease, diabetes mellitus, hypertension.  Who presented to [facility] on 2025 with back pain status post fall.  Patient states that she woke up approximately 3 AM this morning.  She attempted to get out of bed and slipped and fell backwards hitting head initially falling onto a bag and hyperextending her legs over her head.  Remembers the entire event denies any loss of consciousness.  With complaints of headache, as well as low back pain.  Patient shortly developed vomiting thereafter.  EMS was contacted and transported patient to this facility, while en route patient received Zofran in ketorolac.    While in the emergency department patient continued with nausea and vomiting, as well as back pain.  Labs were obtained which was remarkable for: Glucose 214, WBC 18.55, hemoglobin 16.6    CT head moderate size occipital scalp hematoma, no acute intracranial abnormalities.    Lumbar spine x-ray showed new anterior wedging compression fracture at the superior endplate of L1 with approximately 30% loss of height anteriorly.      Review of Systems  Review of Systems   Cardiovascular:  Positive for chest pain.   Gastrointestinal:  Positive for nausea and vomiting.   Musculoskeletal:  Positive for back pain.   Neurological:  Positive for headaches.   Psychiatric/Behavioral:  The patient is nervous/anxious.  Personal History     Past Medical History:   Diagnosis Date    CAD (coronary artery disease)     Diabetes     DVT (deep venous thrombosis)     Hypertension        Past Surgical History:   Procedure  Laterality Date    ANGIOPLASTY  09/08/2022    failed peripheral angioplasty by Dr Kurtz    ANGIOPLASTY ILIAC ARTERY Bilateral 9/9/2022    Procedure: BILATERAL COMMON ILIAC ARTERY ANGIOPLASTY AND STENT PLACEMENT, RIGHT EXTERNAL ILIAC ARTERY ANGIOPLASTY AND STENT, RIGHT EXTERNAL ILIAC ARTERY DRUG COATED BALLOON ANGIOPLASTY AND RIGHT LOWER EXTREMITY ANGIOGRAM;  Surgeon: Shirley Kurtz MD;  Location: [facility] MAIN OR;  Service: Vascular;  Laterality: Bilateral;    CARDIAC CATHETERIZATION N/A 9/8/2022    Procedure: Thrombolysis-peripheral;  Surgeon: Shirley Kurtz MD;  Location: [facility] CATH INVASIVE LOCATION;  Service: Cardiovascular;  Laterality: N/A;    ILIAC ARTERY STENT Bilateral     At [facility]    LEFT HEART CATH         Family History: family history includes Diabetes type II in her maternal grandfather and mother; Heart disease in her mother; High cholesterol in her mother; Hypertension in her mother; No Known Problems in her father; Stroke in her mother. Otherwise pertinent FHx was reviewed and not pertinent to current issue.    Social History:  reports that she has quit smoking. Her smoking use included cigarettes. She has a 27 pack-year smoking history. She has never used smokeless tobacco. She reports that she does not currently use alcohol. She reports that she does not use drugs.    Home Medications:  Prior to Admission Medications       Prescriptions Last Dose Informant Patient Reported? Taking?    amLODIPine (NORVASC) 5 MG tablet   No Yes    Take 1 tablet by mouth Daily.    apixaban (ELIQUIS) 2.5 MG tablet tablet   No Yes    Take 1 tablet by mouth 2 (Two) Times a Day.    atorvastatin (LIPITOR) 80 MG tablet  Medication Bottle Yes Yes    Take 1 tablet by mouth Daily.    buPROPion XL (WELLBUTRIN XL) 300 MG 24 hr tablet   Yes Yes    Take 1 tablet by mouth Every Morning.    clopidogrel (PLAVIX) 75 MG tablet   No Yes    Take 1 tablet by mouth Daily.    DULoxetine HCl 40 MG capsule  delayed-release particles   Yes Yes    Take 1 capsule by mouth Daily.    empagliflozin (JARDIANCE) 10 MG tablet tablet   Yes Yes    Take 1 tablet by mouth Daily.    isosorbide mononitrate (IMDUR) 60 MG 24 hr tablet   Yes Yes    Take 1 tablet by mouth Daily.    metoprolol tartrate (LOPRESSOR) 100 MG tablet   Yes Yes    Take 1 tablet by mouth 2 (Two) Times a Day.    ondansetron ODT (ZOFRAN-ODT) 4 MG disintegrating tablet   No No    Place 1 tablet on the tongue 4 (Four) Times a Day As Needed for Nausea or Vomiting.    Semaglutide, 2 MG/DOSE, (Ozempic, 2 MG/DOSE,) 8 MG/3ML solution pen-injector   Yes Yes    Inject 1 mg under the skin into the appropriate area as directed 1 (One) Time Per Week.              Allergies:  Allergies   Allergen Reactions    Aspirin GI Intolerance and Other (See Comments)     Nausea and vomiting       Hydrocodone Itching       Objective      Vitals:   Temp:  [98 °F (36.7 °C)-98.1 °F (36.7 °C)] 98 °F (36.7 °C)  Heart Rate:  [74-87] 87  Resp:  [18-20] 18  BP: (157-182)/(86-92) 182/89  Body mass index is 24.8 kg/m².  Physical Exam  Physical Exam  Constitutional:       General: She is in acute distress.   HENT:      Head:      Comments: Occipital scalp tenderness     Mouth/Throat:      Mouth: Mucous membranes are moist.      Pharynx: Oropharynx is clear.   Eyes:      Extraocular Movements: Extraocular movements intact.      Conjunctiva/sclera: Conjunctivae normal.      Pupils: Pupils are equal, round, and reactive to light.   Cardiovascular:      Rate and Rhythm: Normal rate and regular rhythm.      Heart sounds: Normal heart sounds.   Pulmonary:      Effort: Pulmonary effort is normal.      Breath sounds: Normal breath sounds.   Abdominal:      General: Bowel sounds are normal.      Palpations: Abdomen is soft.   Musculoskeletal:         General: Normal range of motion.      Cervical back: Normal range of motion and neck supple. No tenderness.   Skin:     General: Skin is warm and dry.       Capillary Refill: Capillary refill takes less than 2 seconds.   Neurological:      Mental Status: She is alert and oriented to person, place, and time.      Cranial Nerves: No cranial nerve deficit.      Sensory: No sensory deficit.      Motor: No weakness.   Diagnostic Data:  Lab Results (last 24 hours)       Procedure Component Value Units Date/Time    POC Glucose Once [[id number]]  (Abnormal) Collected: 05/22/25 0944    Specimen: Blood Updated: 05/22/25 0946     Glucose 214 mg/dL      Comment: Serial Number: [id number]:  [id number]       High Sensitivity Troponin T 1Hr [[id number]]  (Normal) Collected: 05/22/25 0616    Specimen: Blood Updated: 05/22/25 0645     HS Troponin T 10 ng/L      Troponin T Numeric Delta -2 ng/L     Narrative:      High Sensitive Troponin T Reference Range:  <14.0 ng/L- Negative Female for AMI  <22.0 ng/L- Negative Male for AMI  >=14 - Abnormal Female indicating possible myocardial injury.  >=22 - Abnormal Male indicating possible myocardial injury.   Clinicians would have to utilize clinical acumen, EKG, Troponin, and serial changes to determine if it is an Acute Myocardial Infarction or myocardial injury due to an underlying chronic condition.         Basic Metabolic Panel [[id number]]  (Abnormal) Collected: 05/22/25 0512    Specimen: Blood Updated: 05/22/25 0559     Glucose 194 mg/dL      BUN 17 mg/dL      Creatinine 0.77 mg/dL      Sodium 143 mmol/L      Potassium 4.3 mmol/L      Chloride 106 mmol/L      CO2 25.2 mmol/L      Calcium 10.2 mg/dL      BUN/Creatinine Ratio 22.1     Anion Gap 11.8 mmol/L      eGFR 88.4 mL/min/1.73     Narrative:      GFR Categories in Chronic Kidney Disease (CKD)              GFR Category          GFR (mL/min/1.73)    Interpretation  G1                    90 or greater        Normal or high (1)  G2                    60-89                Mild decrease (1)  G3a                   45-59                Mild to moderate decrease  G3b                   30-44                 Moderate to severe decrease  G4                    15-29                Severe decrease  G5                    14 or less           Kidney failure    (1)In the absence of evidence of kidney disease, neither GFR category G1 or G2 fulfill the criteria for CKD.    eGFR calculation 2021 CKD-EPI creatinine equation, which does not include race as a factor    High Sensitivity Troponin T [[id number]]  (Normal) Collected: 05/22/25 0512    Specimen: Blood Updated: 05/22/25 0559     HS Troponin T 12 ng/L     Narrative:      High Sensitive Troponin T Reference Range:  <14.0 ng/L- Negative Female for AMI  <22.0 ng/L- Negative Male for AMI  >=14 - Abnormal Female indicating possible myocardial injury.  >=22 - Abnormal Male indicating possible myocardial injury.   Clinicians would have to utilize clinical acumen, EKG, Troponin, and serial changes to determine if it is an Acute Myocardial Infarction or myocardial injury due to an underlying chronic condition.         CBC & Differential [[id number]]  (Abnormal) Collected: 05/22/25 0512    Specimen: Blood Updated: 05/22/25 0519    Narrative:      The following orders were created for panel order CBC & Differential.  Procedure                               Abnormality         Status                     ---------                               -----------         ------                     CBC Auto Differential[[id number]]        Abnormal            Final result                 Please view results for these tests on the individual orders.    CBC Auto Differential [[id number]]  (Abnormal) Collected: 05/22/25 0512    Specimen: Blood Updated: 05/22/25 0519     WBC 18.55 10*3/mm3      RBC 5.81 10*6/mm3      Hemoglobin 16.6 g/dL      Hematocrit 53.1 %      MCV 91.4 fL      MCH 28.6 pg      MCHC 31.3 g/dL      RDW 13.0 %      RDW-SD 44.1 fl      MPV 10.1 fL      Platelets 321 10*3/mm3      Neutrophil % 70.7 %      Lymphocyte % 21.5 %      Monocyte % 4.5 %      Eosinophil % 1.5 %       Basophil % 0.8 %      Immature Grans % 1.0 %      Neutrophils, Absolute 13.11 10*3/mm3      Lymphocytes, Absolute 3.99 10*3/mm3      Monocytes, Absolute 0.84 10*3/mm3      Eosinophils, Absolute 0.27 10*3/mm3      Basophils, Absolute 0.15 10*3/mm3      Immature Grans, Absolute 0.19 10*3/mm3      nRBC 0.0 /100 WBC     [place] Draw [[id number]] Collected: 05/22/25 0512    Specimen: Blood Updated: 05/22/25 0515    Narrative:      The following orders were created for panel order [place] Draw.  Procedure                               Abnormality         Status                     ---------                               -----------         ------                     Green Top (Gel)[[id number]]                                  Final result               Lavender Top[[id number]]                                     Final result               Gold Top - SST[[id number]]                                   Final result               Light Blue Top[[id number]]                                   Final result                 Please view results for these tests on the individual orders.    Light Blue Top [[id number]] Collected: 05/22/25 0512    Specimen: Blood Updated: 05/22/25 0515     Extra Tube Hold for add-ons.     Comment: Auto resulted       Green Top (Gel) [[id number]] Collected: 05/22/25 0512    Specimen: Blood Updated: 05/22/25 0515     Extra Tube Hold for add-ons.     Comment: Auto resulted.       Lavender Top [[id number]] Collected: 05/22/25 0512    Specimen: Blood Updated: 05/22/25 0515     Extra Tube hold for add-on     Comment: Auto resulted       Gold Top - SST [[id number]] Collected: 05/22/25 0512    Specimen: Blood Updated: 05/22/25 0515     Extra Tube Hold for add-ons.     Comment: Auto resulted.                Imaging Results (Last 24 Hours)       Procedure Component Value Units Date/Time    XR Spine Lumbar 2 or 3 View [[id number]] Collected: 05/22/25 0605     Updated: 05/22/25 0609    Narrative:      XR SPINE LUMBAR 2 OR  3 VW    Date of Exam: 5/22/2025 5:33 AM EDT    Indication: trauma    Comparison: 1/23/2023.    Findings:  There are 5 lumbar type vertebral bodies.  Alignment is anatomic. There is mild stable narrowing of the L5-S1 disc. There is a new anterior wedging compression fracture at the superior endplate of L1 with approximately 30% loss of height anteriorly. This   is new from 3/19/2024, but otherwise age-indeterminate radiographically. There is mild multilevel marginal osteophyte formation. There is mild mid and lower lumbar facet arthropathy. Spinous and transverse processes and pedicles appear intact.    Sacroiliac joints appear symmetric. There are bilateral iliac artery stents in place. Surgical clips noted in the right upper quadrant. There are vascular calcifications.      Impression:      Impression:  1.New anterior wedging compression fracture at the superior endplate of L1 with approximately 30% loss of height anteriorly. This is new from 1/23/2023, but otherwise age-indeterminate radiographically.  2.Mild multilevel disc and facet joint degeneration.          Electronically Signed: Arsenio Abraham MD    5/22/2025 6:07 AM EDT    Workstation ID: [id number]    XR Chest 1 View [[id number]] Collected: 05/22/25 0605     Updated: 05/22/25 0608    Narrative:      XR CHEST 1 VW    Date of Exam: 5/22/2025 5:27 AM EDT    Indication: cp    Comparison: 8/26/2024.    Findings:  There is no pneumothorax, pleural effusion or focal airspace consolidation. Heart size and pulmonary vasculature appear within normal limits. Regional bones appear intact.      Impression:      Impression:  No acute cardiopulmonary abnormality.          Electronically Signed: Arsenio Abraham MD    5/22/2025 6:05 AM EDT    Workstation ID: [id number]    CT Head Without Contrast [[id number]] Collected: 05/22/25 0557     Updated: 05/22/25 0600    Narrative:      CT HEAD WO CONTRAST    Date of Exam: 5/22/2025 5:15 AM EDT    Indication: head injury.    Comparison:  9/16/2024.    Technique: Axial CT images were obtained of the head without contrast administration.  Coronal reconstructions were performed.  Automated exposure control and iterative reconstruction methods were used.      Findings:  There is a moderate-sized slightly right of midline occipital scalp hematoma. There is a stable rounded ossified structure along the inner table of the left frontal calvarium, which could represent an osteoma or densely calcified small meningioma   measuring 9 mm, unchanged from prior studies. The calvarium is intact.  Paranasal sinuses and mastoid air cells appear well aerated.  Orbits are unremarkable.  There is no acute intracranial hemorrhage.  No mass effect or midline shift.  No abnormal   extra-axial collections.  Gray-white differentiation is within normal limits.  There are no focal hypoattenuating lesions.  Ventricular size and configuration is normal for age.      Impression:      Impression:  1.No acute intracranial abnormality.  2.Moderate-sized occipital scalp hematoma.  3.Stable 9 mm ossified structure along the inner table of the left frontal calvarium, which could represent an osteoma or densely calcified meningioma.          Electronically Signed: Arsenio Abraham MD    5/22/2025 5:58 AM EDT    Workstation ID: [id number]              Assessment & Plan        This is a 59 y.o. female with:    Active and Resolved Problems  Active Hospital Problems    Diagnosis  POA    **Lumbar compression fracture, closed, initial encounter [S32.000A]  Yes      Resolved Hospital Problems   No resolved problems to display.       Fall  Compression fracture  -CT head moderate size occipital scalp hematoma, no acute intracranial abnormalities.  -Lumbar spine x-ray showed new anterior wedging compression fracture at the superior endplate of L1 with approximately 30% loss of height anteriorly.  -IV pain control, switch to p.o. prior to discharge  - Neurosurgery consult  - Zofran, scopolamine patch  for vomiting  - N.p.o. until neurosurgery evaluates patient, then advance to diabetic diet as tolerated.    Hypertension  CAD  - Blood pressure on admission 182/89  - Metoprolol IV x 1  - Resume home medications    DM2  - Accu-Cheks AC and at bedtime  - A1c  - SSI  - CCD        VTE Prophylaxis:  Mechanical VTE prophylaxis orders are present.        The patient desires to be as follows:    CODE STATUS:    Code Status (Patient has no pulse and is not breathing): CPR (Attempt to Resuscitate)  Medical Interventions (Patient has pulse or is breathing): Full Support  Level Of Support Discussed With: Patient        Ines Benito, who can be contacted at [phone], is the designated person to make medical decisions on the patient's behalf if She is incapable of doing so. This was clarified with patient and/or next of kin on 5/22/2025 during the course of this H&P.    Admission Status:  I believe this patient meets an observation status.    Expected Length of Stay: 2    PDMP and Medication Dispenses via Sidebar reviewed and consistent with patient reported medications.    I discussed the patient's findings and my recommendations with patient.      Signature:     This document has been electronically signed by SOREN Hogan on May 22, 2025 12:25 EDT   [facility]

## 2025-05-22 NOTE — CASE MANAGEMENT/SOCIAL WORK
Discharge Planning Assessment   [place]     Patient Name: Blossom Martin  MRN: [id number]  Today's Date: 5/22/2025    Admit Date: 5/22/2025    Plan: D/C Plan: Return home alone. Family transport.   Discharge Needs Assessment       Row Name 05/22/25 1518       Living Environment    People in Home alone    Current Living Arrangements apartment    Potentially Unsafe Housing Conditions none    In the past 12 months has the electric, gas, oil, or water company threatened to shut off services in your home? No    Primary Care Provided by self    Provides Primary Care For no one    Family Caregiver if Needed child(tramaine), adult    Family Caregiver Names Ines    Quality of Family Relationships helpful;involved;supportive    Able to Return to Prior Arrangements yes       Resource/Environmental Concerns    Resource/Environmental Concerns none    Transportation Concerns none       Transportation Needs    In the past 12 months, has lack of transportation kept you from medical appointments or from getting medications? no    In the past 12 months, has lack of transportation kept you from meetings, work, or from getting things needed for daily living? No       Food Insecurity    Within the past 12 months, you worried that your food would run out before you got the money to buy more. Never true    Within the past 12 months, the food you bought just didn't last and you didn't have money to get more. Never true       Transition Planning    Patient/Family Anticipates Transition to home    Patient/Family Anticipated Services at Transition none    Transportation Anticipated family or friend will provide       Discharge Needs Assessment    Readmission Within the Last 30 Days no previous admission in last 30 days    Equipment Currently Used at Home none    Concerns to be Addressed denies needs/concerns at this time    Do you want help finding or keeping work or a job? I do not need or want help    Do you want help with school or training?  For example, starting or completing job training or getting a high school diploma, GED or equivalent No    Anticipated Changes Related to Illness none    Equipment Needed After Discharge none    Provided Post Acute Provider List? N/A    Provided Post Acute Provider Quality & Resource List? N/A    Current Discharge Risk lives alone                   Discharge Plan       Row Name 05/22/25 1518       Plan    Plan D/C Plan: Return home alone. Family transport.    Plan Comments CM spoke with patient at bedside to discuss admission assessment and discharge planning. Patient is oriented x3. Patient confirms PCP and pharmacy. Patient has declined meds to bed program at this time. Patient denies any difficulty affording medications at this time. Patient denies any additional needs for services or DME at this time. D/C barriers: pain management, IV pain medications.             Expected Discharge Date and Time       Expected Discharge Date Expected Discharge Time    May 24, 2025            Demographic Summary       Row Name 05/22/25 1517       General Information    Admission Type observation    Arrived From home    Referral Source admission list    Reason for Consult discharge planning    Preferred Language English       Contact Information    Permission Granted to Share Info With                    Functional Status       Row Name 05/22/25 1518       Functional Status    Usual Activity Tolerance good    Current Activity Tolerance good       Functional Status, IADL    Medications independent    Meal Preparation independent    Housekeeping independent    Laundry independent    Shopping independent    If for any reason you need help with day-to-day activities such as bathing, preparing meals, shopping, managing finances, etc., do you get the help you need? I don't need any help       Mental Status    General Appearance WDL WDL       Mental Status Summary    Recent Changes in Mental Status/Cognitive Functioning no  changes       Employment/    Employment Status disabled;, previous service           Current or Previous  Service none             Tasia Parker RN     [place] [id number]  Phone: [phone]  Fax: [phone]

## 2025-05-22 NOTE — ED NOTES
Pt was trying to get into bed and fell back and hit back of head on concrete floor. Pt is on plavix. There is a knot on back of head but no bleeding. Pt denies losing consciousness. Pt also vomited in the ambulance

## 2025-05-22 NOTE — ED PROVIDER NOTES
Subjective   History of Present Illness  59-year-old female who slipped and fell getting out of bed backwards hitting head, no LOC, moderate headache, moderate lower back pain with associated nausea and vomiting.  Patient received Zofran and ketorolac 15 mg IM per EMS.  Patient then developed anxiety and had some chest discomfort.      Review of Systems   Cardiovascular:  Positive for chest pain.   Gastrointestinal:  Positive for nausea and vomiting.   Musculoskeletal:  Positive for back pain.   Neurological:  Positive for headaches.   Psychiatric/Behavioral:  The patient is nervous/anxious.        Past Medical History:   Diagnosis Date    CAD (coronary artery disease)     Diabetes     DVT (deep venous thrombosis)     Hypertension        Allergies   Allergen Reactions    Aspirin GI Intolerance and Other (See Comments)     Nausea and vomiting       Hydrocodone Itching       Past Surgical History:   Procedure Laterality Date    ANGIOPLASTY  09/08/2022    failed peripheral angioplasty by Dr Kurtz    ANGIOPLASTY ILIAC ARTERY Bilateral 9/9/2022    Procedure: BILATERAL COMMON ILIAC ARTERY ANGIOPLASTY AND STENT PLACEMENT, RIGHT EXTERNAL ILIAC ARTERY ANGIOPLASTY AND STENT, RIGHT EXTERNAL ILIAC ARTERY DRUG COATED BALLOON ANGIOPLASTY AND RIGHT LOWER EXTREMITY ANGIOGRAM;  Surgeon: Shirley Kurtz MD;  Location: [facility] MAIN OR;  Service: Vascular;  Laterality: Bilateral;    CARDIAC CATHETERIZATION N/A 9/8/2022    Procedure: Thrombolysis-peripheral;  Surgeon: Shirley Kurtz MD;  Location: [facility] CATH INVASIVE LOCATION;  Service: Cardiovascular;  Laterality: N/A;    ILIAC ARTERY STENT Bilateral     At [facility]    LEFT HEART CATH         Family History   Problem Relation Age of Onset    Diabetes type II Mother     Heart disease Mother     Stroke Mother     Hypertension Mother     High cholesterol Mother     No Known Problems Father     Diabetes type II Maternal Grandfather        Social History     Socioeconomic  History    Marital status:     Number of children: 1    Highest education level: High school graduate   Tobacco Use    Smoking status: Former     Current packs/day: 0.75     Average packs/day: 0.8 packs/day for 36.0 years (27.0 ttl pk-yrs)     Types: Cigarettes    Smokeless tobacco: Never   Vaping Use    Vaping status: Never Used   Substance and Sexual Activity    Alcohol use: Not Currently     Comment: ONCE A YEAR    Drug use: Never    Sexual activity: Defer           Objective   Physical Exam  Constitutional:       General: She is in acute distress.   HENT:      Head:      Comments: Occipital scalp tenderness     Mouth/Throat:      Mouth: Mucous membranes are moist.      Pharynx: Oropharynx is clear.   Eyes:      Extraocular Movements: Extraocular movements intact.      Conjunctiva/sclera: Conjunctivae normal.      Pupils: Pupils are equal, round, and reactive to light.   Cardiovascular:      Rate and Rhythm: Normal rate and regular rhythm.      Heart sounds: Normal heart sounds.   Pulmonary:      Effort: Pulmonary effort is normal.      Breath sounds: Normal breath sounds.   Abdominal:      General: Bowel sounds are normal.      Palpations: Abdomen is soft.   Musculoskeletal:         General: Normal range of motion.      Cervical back: Normal range of motion and neck supple. No tenderness.   Skin:     General: Skin is warm and dry.      Capillary Refill: Capillary refill takes less than 2 seconds.   Neurological:      Mental Status: She is alert and oriented to person, place, and time.      Cranial Nerves: No cranial nerve deficit.      Sensory: No sensory deficit.      Motor: No weakness.         Procedures           ED Course                                                       Medical Decision Making  Patient with new L1 compression fracture with severe pain, improved with Dilaudid.  Will observe in the hospital for spine surgery consult.  Chest pain resolved, likely related to anxiety.  Osteoma  versus meningioma discussed with patient and the need for follow-up with PCP.  This does appear to have been present at least for 2 years on prior imaging though it was not noted previously.  Laboratory evaluation remarkable for leukocytosis of uncertain etiology.  Patient states she is felt well here lately without any fever or illness or steroid use.    Problems Addressed:  Chest pain, unspecified type: complicated acute illness or injury  Compression fracture of L1 vertebra, initial encounter: complicated acute illness or injury  Injury of head, initial encounter: complicated acute illness or injury    Amount and/or Complexity of Data Reviewed  Labs: ordered.     Details: White blood cell count 18  Radiology: ordered and independent interpretation performed.     Details: Agree with L1 compression fracture  ECG/medicine tests: ordered and independent interpretation performed.     Details: EKG interpretation: Sinus rhythm, rate 73, no acute ST elevation  Discussion of management or test interpretation with external provider(s): Case discussed with  the hospitalist service, agrees with plan for    Risk  Prescription drug management.  Decision regarding hospitalization.        Final diagnoses:   Compression fracture of L1 vertebra, initial encounter   Injury of head, initial encounter   Chest pain, unspecified type       ED Disposition  ED Disposition       ED Disposition   Decision to Admit    Condition   --    Comment   Level of Care: Telemetry [5]   Admitting Physician: BRYN MOYA [[id number]]                 No follow-up provider specified.       Medication List      No changes were made to your prescriptions during this visit.            Micheal Leal MD  05/22/25 2905

## 2025-05-22 NOTE — NURSING NOTE
It was reported that this patient was brought up from the ED and that she had peed in the trash can at bedside. This nurse went in and spoke to patient explaining that she was not supposed to be getting up. Patient started yelling at staff and said that she was never told that she couldn't get up. Patient was educated about the importance of not getting up unless it was with assistance and her TSLO brace that is at bedside.

## 2025-05-22 NOTE — Clinical Note
Level of Care: Med/Surg [1]   Diagnosis: Lumbar compression fracture, closed, initial encounter [[id number]]   Admitting Physician: PEG OKEEFE [[id number]]   Attending Physician: PEG OKEEFE [[id number]]   Isolate for COVID?: No [0]   Certification: I Certify That Inpatient Hospital Services Are Medically Necessary For Greater Than 2 Midnights

## 2025-05-23 VITALS
HEIGHT: 63 IN | RESPIRATION RATE: 18 BRPM | TEMPERATURE: 98.1 F | BODY MASS INDEX: 24.8 KG/M2 | HEART RATE: 86 BPM | OXYGEN SATURATION: 98 % | SYSTOLIC BLOOD PRESSURE: 161 MMHG | DIASTOLIC BLOOD PRESSURE: 84 MMHG

## 2025-05-23 LAB
ANION GAP SERPL CALCULATED.3IONS-SCNC: 11 MMOL/L (ref 5–15)
BACTERIA UR QL AUTO: ABNORMAL /HPF
BASOPHILS # BLD AUTO: 0.03 10*3/MM3 (ref 0–0.2)
BASOPHILS NFR BLD AUTO: 0.2 % (ref 0–1.5)
BILIRUB UR QL STRIP: NEGATIVE
BUN SERPL-MCNC: 12 MG/DL (ref 8–23)
BUN/CREAT SERPL: 24.5 (ref 7–25)
CALCIUM SPEC-SCNC: 8.2 MG/DL (ref 8.6–10.5)
CHLORIDE SERPL-SCNC: 109 MMOL/L (ref 98–107)
CLARITY UR: ABNORMAL
CO2 SERPL-SCNC: 21 MMOL/L (ref 22–29)
COLOR UR: YELLOW
CREAT SERPL-MCNC: 0.49 MG/DL (ref 0.57–1)
DEPRECATED RDW RBC AUTO: 44 FL (ref 37–54)
EGFRCR SERPLBLD CKD-EPI 2021: 108.1 ML/MIN/1.73
EOSINOPHIL # BLD AUTO: 0.01 10*3/MM3 (ref 0–0.4)
EOSINOPHIL NFR BLD AUTO: 0.1 % (ref 0.3–6.2)
ERYTHROCYTE [DISTWIDTH] IN BLOOD BY AUTOMATED COUNT: 13.1 % (ref 12.3–15.4)
GLUCOSE BLDC GLUCOMTR-MCNC: 159 MG/DL (ref 70–105)
GLUCOSE SERPL-MCNC: 134 MG/DL (ref 65–99)
GLUCOSE UR STRIP-MCNC: ABNORMAL MG/DL
HCT VFR BLD AUTO: 46.8 % (ref 34–46.6)
HGB BLD-MCNC: 14.8 G/DL (ref 12–15.9)
HGB UR QL STRIP.AUTO: ABNORMAL
HYALINE CASTS UR QL AUTO: ABNORMAL /LPF
IMM GRANULOCYTES # BLD AUTO: 0.13 10*3/MM3 (ref 0–0.05)
IMM GRANULOCYTES NFR BLD AUTO: 0.8 % (ref 0–0.5)
KETONES UR QL STRIP: ABNORMAL
LEUKOCYTE ESTERASE UR QL STRIP.AUTO: NEGATIVE
LYMPHOCYTES # BLD AUTO: 1.83 10*3/MM3 (ref 0.7–3.1)
LYMPHOCYTES NFR BLD AUTO: 11.2 % (ref 19.6–45.3)
MCH RBC QN AUTO: 28.7 PG (ref 26.6–33)
MCHC RBC AUTO-ENTMCNC: 31.6 G/DL (ref 31.5–35.7)
MCV RBC AUTO: 90.7 FL (ref 79–97)
MONOCYTES # BLD AUTO: 0.91 10*3/MM3 (ref 0.1–0.9)
MONOCYTES NFR BLD AUTO: 5.6 % (ref 5–12)
NEUTROPHILS NFR BLD AUTO: 13.42 10*3/MM3 (ref 1.7–7)
NEUTROPHILS NFR BLD AUTO: 82.1 % (ref 42.7–76)
NITRITE UR QL STRIP: NEGATIVE
NRBC BLD AUTO-RTO: 0 /100 WBC (ref 0–0.2)
PH UR STRIP.AUTO: 5.5 [PH] (ref 5–8)
PLATELET # BLD AUTO: 219 10*3/MM3 (ref 140–450)
PMV BLD AUTO: 10.2 FL (ref 6–12)
POTASSIUM SERPL-SCNC: 3.3 MMOL/L (ref 3.5–5.2)
PROT UR QL STRIP: ABNORMAL
QT INTERVAL: 357 MS
QTC INTERVAL: 393 MS
RBC # BLD AUTO: 5.16 10*6/MM3 (ref 3.77–5.28)
RBC # UR STRIP: ABNORMAL /HPF
RBC MORPH BLD: NORMAL
REF LAB TEST METHOD: ABNORMAL
SMALL PLATELETS BLD QL SMEAR: ADEQUATE
SODIUM SERPL-SCNC: 141 MMOL/L (ref 136–145)
SP GR UR STRIP: 1.04 (ref 1–1.03)
SQUAMOUS #/AREA URNS HPF: ABNORMAL /HPF
UROBILINOGEN UR QL STRIP: ABNORMAL
WBC # UR STRIP: ABNORMAL /HPF
WBC MORPH BLD: NORMAL
WBC NRBC COR # BLD AUTO: 16.33 10*3/MM3 (ref 3.4–10.8)

## 2025-05-23 PROCEDURE — 99213 OFFICE O/P EST LOW 20 MIN: CPT | Performed by: NURSE PRACTITIONER

## 2025-05-23 PROCEDURE — 87088 URINE BACTERIA CULTURE: CPT | Performed by: EMERGENCY MEDICINE

## 2025-05-23 PROCEDURE — 63710000001 INSULIN LISPRO (HUMAN) PER 5 UNITS: Performed by: NURSE PRACTITIONER

## 2025-05-23 PROCEDURE — 25010000002 HYDROMORPHONE PER 4 MG: Performed by: NURSE PRACTITIONER

## 2025-05-23 PROCEDURE — 87086 URINE CULTURE/COLONY COUNT: CPT | Performed by: EMERGENCY MEDICINE

## 2025-05-23 PROCEDURE — 96375 TX/PRO/DX INJ NEW DRUG ADDON: CPT

## 2025-05-23 PROCEDURE — 25010000002 ONDANSETRON PER 1 MG

## 2025-05-23 PROCEDURE — 85025 COMPLETE CBC W/AUTO DIFF WBC: CPT | Performed by: NURSE PRACTITIONER

## 2025-05-23 PROCEDURE — 25010000002 HYDRALAZINE PER 20 MG: Performed by: NURSE PRACTITIONER

## 2025-05-23 PROCEDURE — 82948 REAGENT STRIP/BLOOD GLUCOSE: CPT | Performed by: STUDENT IN AN ORGANIZED HEALTH CARE EDUCATION/TRAINING PROGRAM

## 2025-05-23 PROCEDURE — 87186 SC STD MICRODIL/AGAR DIL: CPT | Performed by: EMERGENCY MEDICINE

## 2025-05-23 PROCEDURE — 96376 TX/PRO/DX INJ SAME DRUG ADON: CPT

## 2025-05-23 PROCEDURE — 80048 BASIC METABOLIC PNL TOTAL CA: CPT | Performed by: NURSE PRACTITIONER

## 2025-05-23 PROCEDURE — G0378 HOSPITAL OBSERVATION PER HR: HCPCS

## 2025-05-23 PROCEDURE — 81001 URINALYSIS AUTO W/SCOPE: CPT | Performed by: EMERGENCY MEDICINE

## 2025-05-23 PROCEDURE — 97165 OT EVAL LOW COMPLEX 30 MIN: CPT

## 2025-05-23 PROCEDURE — 97161 PT EVAL LOW COMPLEX 20 MIN: CPT

## 2025-05-23 PROCEDURE — 85007 BL SMEAR W/DIFF WBC COUNT: CPT | Performed by: NURSE PRACTITIONER

## 2025-05-23 RX ORDER — POTASSIUM CHLORIDE 1500 MG/1
20 TABLET, EXTENDED RELEASE ORAL ONCE
Status: COMPLETED | OUTPATIENT
Start: 2025-05-23 | End: 2025-05-23

## 2025-05-23 RX ORDER — HYDROCODONE BITARTRATE AND ACETAMINOPHEN 10; 325 MG/1; MG/1
1 TABLET ORAL EVERY 4 HOURS PRN
Qty: 18 TABLET | Refills: 0 | Status: SHIPPED | OUTPATIENT
Start: 2025-05-23 | End: 2025-05-26

## 2025-05-23 RX ADMIN — INSULIN LISPRO 2 UNITS: 100 INJECTION, SOLUTION INTRAVENOUS; SUBCUTANEOUS at 08:45

## 2025-05-23 RX ADMIN — ONDANSETRON 4 MG: 2 INJECTION, SOLUTION INTRAMUSCULAR; INTRAVENOUS at 05:39

## 2025-05-23 RX ADMIN — Medication 10 ML: at 08:46

## 2025-05-23 RX ADMIN — CLOPIDOGREL BISULFATE 75 MG: 75 TABLET, FILM COATED ORAL at 09:01

## 2025-05-23 RX ADMIN — POTASSIUM CHLORIDE 20 MEQ: 1500 TABLET, EXTENDED RELEASE ORAL at 04:57

## 2025-05-23 RX ADMIN — METOPROLOL TARTRATE 100 MG: 50 TABLET, FILM COATED ORAL at 08:48

## 2025-05-23 RX ADMIN — ATORVASTATIN CALCIUM 80 MG: 40 TABLET, FILM COATED ORAL at 08:45

## 2025-05-23 RX ADMIN — HYDROMORPHONE HYDROCHLORIDE 0.5 MG: 1 INJECTION, SOLUTION INTRAMUSCULAR; INTRAVENOUS; SUBCUTANEOUS at 05:39

## 2025-05-23 RX ADMIN — BISACODYL 5 MG: 5 TABLET, COATED ORAL at 08:45

## 2025-05-23 RX ADMIN — APIXABAN 2.5 MG: 2.5 TABLET, FILM COATED ORAL at 09:01

## 2025-05-23 RX ADMIN — EMPAGLIFLOZIN 10 MG: 10 TABLET, FILM COATED ORAL at 08:45

## 2025-05-23 RX ADMIN — ISOSORBIDE MONONITRATE 60 MG: 30 TABLET, EXTENDED RELEASE ORAL at 08:48

## 2025-05-23 RX ADMIN — BUPROPION HYDROCHLORIDE 300 MG: 150 TABLET, EXTENDED RELEASE ORAL at 08:45

## 2025-05-23 RX ADMIN — POLYETHYLENE GLYCOL 3350 17 G: 17 POWDER, FOR SOLUTION ORAL at 08:45

## 2025-05-23 RX ADMIN — AMLODIPINE BESYLATE 5 MG: 5 TABLET ORAL at 08:45

## 2025-05-23 RX ADMIN — HYDRALAZINE HYDROCHLORIDE 10 MG: 20 INJECTION INTRAMUSCULAR; INTRAVENOUS at 04:57

## 2025-05-23 NOTE — PLAN OF CARE
Goal Outcome Evaluation:  Plan of Care Reviewed With: patient           Outcome Evaluation: 58 y/o female brought in hospital following fall at home with report of back pain; found to have new L1 compression fx and issued with TLSO, as well as scalp hematoma. Pmh includes CAD, htn. Patient lives alone and normally independent without a.d. At time of eval, patient able to perform supine <>sit with verbal cues for log roll technique. Patient needed minimal assistance to don TLSO. Patient able to perform transfers with SBA , no dizziness reported. Patient able to ambulate 50 ft with CG/SBA . No veering, no loss of balance noted. Patient noted to have drop in BP from sitting to standing, BP sitting 144/90, standing 109/72 however returned to 148/82 after gait. Patient only c/o queasiness during activity. Patient is safe to return home with family supervision/assistance for household tasks as patient will have spine restrictions and will need monitoring for possible concussion.    Anticipated Discharge Disposition (PT): home with assist, home with supervision

## 2025-05-23 NOTE — DISCHARGE SUMMARY
[facility] Medicine Services  Discharge Summary    Date of Service: 2025  Patient Name: Blossom Martin  : 1965  MRN: [id number]    Date of Admission: 2025  Discharge Diagnosis: Lumbar compression fracture, closed, initial encounter  Date of Discharge: 2025  Primary Care Physician: Collin Low APRN      Presenting Problem:   Injury of head, initial encounter [S09.90XA]  Lumbar compression fracture, closed, initial encounter [S32.000A]  Chest pain, unspecified type [R07.9]  Compression fracture of L1 vertebra, initial encounter [S32.010A]    Active and Resolved Hospital Problems:  Active Hospital Problems    Diagnosis POA    **Lumbar compression fracture, closed, initial encounter [S32.000A] Yes      Resolved Hospital Problems   No resolved problems to display.         Hospital Course     HPI:    Chief Complaint: Low back pain     History of Present Illness: Blossom Martin is a 59 y.o. female with a CMH of coronary artery disease, diabetes mellitus, hypertension.  Who presented to [facility] on 2025 with back pain status post fall.  Patient states that she woke up approximately 3 AM this morning.  She attempted to get out of bed and slipped and fell backwards hitting head initially falling onto a bag and hyperextending her legs over her head.  Remembers the entire event denies any loss of consciousness.  With complaints of headache, as well as low back pain.  Patient shortly developed vomiting thereafter.  EMS was contacted and transported patient to this facility, while en route patient received Zofran in ketorolac.     While in the emergency department patient continued with nausea and vomiting, as well as back pain.  Labs were obtained which was remarkable for: Glucose 214, WBC 18.55, hemoglobin 16.6     CT head moderate size occipital scalp hematoma, no acute intracranial abnormalities.     Lumbar spine x-ray showed new anterior wedging compression  fracture at the superior endplate of L1 with approximately 30% loss of height anteriorly.    Hospital Course:  Patient was admitted with above.  X-ray revealed new anterior wedge/compression fracture.  Neurosurgical services were consulted.  No acute intervention was recommended.  Recommendation was made for TLSO brace.  Patient evaluated and recommended for outpatient follow-up.  Patient given prescription for hydrocodone 10/325 1 tablet p.o. every 4 as needed at discharge.  She will follow-up as an outpatient with spine surgery services.  She otherwise feels well and is ambulatory throughout room.  She is requesting discharge home.        DISCHARGE Follow Up Recommendations for labs and diagnostics: Follow-up outpatient with neurosurgical services.  Follow-up PCP 7 to 10 days.        Day of Discharge     Vital Signs:  Temp:  [98 °F (36.7 °C)-98.3 °F (36.8 °C)] 98.1 °F (36.7 °C)  Heart Rate:  [84-95] 86  Resp:  [13-18] 18  BP: (135-185)/() 161/84    Physical Exam:  Physical Exam       Pertinent  and/or Most Recent Results     LAB RESULTS:      Lab 05/23/25  0048 05/22/25  0512   WBC 16.33* 18.55*   HEMOGLOBIN 14.8 16.6*   HEMATOCRIT 46.8* 53.1*   PLATELETS 219 321   NEUTROS ABS 13.42* 13.11*   IMMATURE GRANS (ABS) 0.13* 0.19*   LYMPHS ABS 1.83 3.99*   MONOS ABS 0.91* 0.84   EOS ABS 0.01 0.27   MCV 90.7 91.4         Lab 05/23/25  0048 05/22/25  0512   SODIUM 141 143   POTASSIUM 3.3* 4.3   CHLORIDE 109* 106   CO2 21.0* 25.2   ANION GAP 11.0 11.8   BUN 12 17   CREATININE 0.49* 0.77   EGFR 108.1 88.4   GLUCOSE 134* 194*   CALCIUM 8.2* 10.2   HEMOGLOBIN A1C  --  6.62*             Lab 05/22/25  0616 05/22/25  0512   HSTROP T 10 12                 Brief Urine Lab Results  (Last result in the past 365 days)        Color   Clarity   Blood   Leuk Est   Nitrite   Protein   CREAT   Urine HCG        05/23/25 0742 Yellow   Cloudy   Trace   Negative   Negative   100 mg/dL (2+)                 Microbiology Results (last 10  days)       Procedure Component Value - Date/Time    Respiratory Panel PCR w/COVID-19(SARS-CoV-2) LIS/BENITO/PATRICE/PAD/COR/MIRIAM In-House, NP Swab in UTM/VTM, 2 HR TAT - Swab, Nasopharynx [[id number]]  (Normal) Collected: 05/22/25 1518    Lab Status: Final result Specimen: Swab from Nasopharynx Updated: 05/22/25 1616     ADENOVIRUS, PCR Not Detected     Coronavirus 229E Not Detected     Coronavirus HKU1 Not Detected     Coronavirus NL63 Not Detected     Coronavirus OC43 Not Detected     COVID19 Not Detected     Human Metapneumovirus Not Detected     Human Rhinovirus/Enterovirus Not Detected     Influenza A PCR Not Detected     Influenza B PCR Not Detected     Parainfluenza Virus 1 Not Detected     Parainfluenza Virus 2 Not Detected     Parainfluenza Virus 3 Not Detected     Parainfluenza Virus 4 Not Detected     RSV, PCR Not Detected     Bordetella pertussis pcr Not Detected     Bordetella parapertussis PCR Not Detected     Chlamydophila pneumoniae PCR Not Detected     Mycoplasma pneumo by PCR Not Detected    Narrative:      In the setting of a positive respiratory panel with a viral infection PLUS a negative procalcitonin without other underlying concern for bacterial infection, consider observing off antibiotics or discontinuation of antibiotics and continue supportive care. If the respiratory panel is positive for atypical bacterial infection (Bordetella pertussis, Chlamydophila pneumoniae, or Mycoplasma pneumoniae), consider antibiotic de-escalation to target atypical bacterial infection.            CT Lumbar Spine Without Contrast  Result Date: 5/22/2025  Impression: 1.Acute appearing mild compression deformity involving the L1 superior vertebral endplate with approximate 30% loss of vertebral body height. 2 mm bony retropulsion. No subluxation. 2.Moderate to severe central canal stenosis at L4-5. 3.Central disc protrusion at L5-S1 may indent the bilateral S1 nerve roots within the canal. Electronically Signed: Myranda  MD Anali  5/22/2025 2:35 PM EDT  Workstation ID: [id number]    XR Spine Lumbar 2 or 3 View  Result Date: 5/22/2025  Impression: Impression: 1.New anterior wedging compression fracture at the superior endplate of L1 with approximately 30% loss of height anteriorly. This is new from 1/23/2023, but otherwise age-indeterminate radiographically. 2.Mild multilevel disc and facet joint degeneration. Electronically Signed: Arsenio Abraham MD  5/22/2025 6:07 AM EDT  Workstation ID: [id number]    XR Chest 1 View  Result Date: 5/22/2025  Impression: Impression: No acute cardiopulmonary abnormality. Electronically Signed: Arsenio Abraham MD  5/22/2025 6:05 AM EDT  Workstation ID: [id number]    CT Head Without Contrast  Result Date: 5/22/2025  Impression: Impression: 1.No acute intracranial abnormality. 2.Moderate-sized occipital scalp hematoma. 3.Stable 9 mm ossified structure along the inner table of the left frontal calvarium, which could represent an osteoma or densely calcified meningioma. Electronically Signed: Arsenio Abraham MD  5/22/2025 5:58 AM EDT  Workstation ID: [id number]      Results for orders placed during the hospital encounter of 10/27/22    Duplex aorta ivc iliac graft complete CAR    Interpretation Summary    Patent common iliac artery stents and left external iliac artery stent without stenosis.  Aorta patent without stenosis or aneurysmal changes.      Results for orders placed during the hospital encounter of 10/27/22    Duplex aorta ivc iliac graft complete CAR    Interpretation Summary    Patent common iliac artery stents and left external iliac artery stent without stenosis.  Aorta patent without stenosis or aneurysmal changes.          Labs Pending at Discharge:  Pending Results       Procedure [Order ID] Specimen - Date/Time    Potassium [[id number]]     Specimen: Blood     Urine Culture - Urine, Urine, Clean Catch [[id number]] Collected: 05/23/25 0742    Specimen: Urine, Clean Catch Updated: 05/23/25 0824             Procedures Performed           Consults:   Consults       Date and Time Order Name Status Description    5/22/2025  7:56 AM Inpatient Neurosurgery Consult Completed     5/22/2025  6:27 AM Inpatient Spine Surgery Consult Completed     5/22/2025  6:27 AM Inpatient Hospitalist Consult                Discharge Details        Discharge Medications        New Medications        Instructions Start Date   HYDROcodone-acetaminophen  MG per tablet  Commonly known as: NORCO   1 tablet, Oral, Every 4 Hours PRN             Continue These Medications        Instructions Start Date   amLODIPine 5 MG tablet  Commonly known as: NORVASC   5 mg, Oral, Every 24 Hours Scheduled      apixaban 2.5 MG tablet tablet  Commonly known as: ELIQUIS   2.5 mg, Oral, 2 Times Daily      atorvastatin 80 MG tablet  Commonly known as: LIPITOR   80 mg, Daily      buPROPion  MG 24 hr tablet  Commonly known as: WELLBUTRIN XL   300 mg, Every Morning      clopidogrel 75 MG tablet  Commonly known as: PLAVIX   75 mg, Oral, Daily      DULoxetine HCl 40 MG capsule delayed-release particles   40 mg, Daily      empagliflozin 10 MG tablet tablet  Commonly known as: JARDIANCE   10 mg, Daily      isosorbide mononitrate 60 MG 24 hr tablet  Commonly known as: IMDUR   Take 1 tablet by mouth Daily.      metoprolol tartrate 100 MG tablet  Commonly known as: LOPRESSOR   100 mg, 2 Times Daily      ondansetron ODT 4 MG disintegrating tablet  Commonly known as: ZOFRAN-ODT   4 mg, Translingual, 4 Times Daily PRN      Ozempic (2 MG/DOSE) 8 MG/3ML solution pen-injector  Generic drug: Semaglutide (2 MG/DOSE)   1 mg, Weekly               Allergies   Allergen Reactions    Aspirin GI Intolerance and Other (See Comments)     Nausea and vomiting       Hydrocodone Itching         Discharge Disposition:   Home or Self Care    Diet:  Hospital:  Diet Order   Procedures    Diet: Regular/House, Diabetic; Consistent Carbohydrate; Fluid Consistency: Thin (IDDSI 0)          Discharge Activity:         CODE STATUS:  Code Status and Medical Interventions: CPR (Attempt to Resuscitate); Full Support   Ordered at: 05/22/25 0756     Code Status (Patient has no pulse and is not breathing):    CPR (Attempt to Resuscitate)     Medical Interventions (Patient has pulse or is breathing):    Full Support     Level Of Support Discussed With:    Patient         No future appointments.        Time spent on Discharge including face to face service: 45 minutes    Signature: Electronically signed by Joceline Johnson MD, 05/23/25, 10:16 EDT.  [facility]

## 2025-05-23 NOTE — PLAN OF CARE
Goal Outcome Evaluation:                                          Patient doing okay. Patient complained at beginning of shift of pain and nausea, PRN meds given, no complaints since. Patient must wear TLSO brace if out of bed. F/U imaging recommended in 6 weeks. Patient potassium was low, will replace. Patient is A&O X4, up with assist X1, takes medications whole.

## 2025-05-23 NOTE — PROGRESS NOTES
NEUROSURGERY PROGRESS NOTE     LOS: 1 day   Patient Care Team:  Collin Low APRN as PCP - General    Chief Complaint:    Chief Complaint   Patient presents with    Fall        Subjective     Interval History: NAEO.  Patient was fitted for TLSO brace yesterday    While in the room and during my examination of the patient I wore a mask and eye protection.  I washed my hands before and after this patient encounter.  The patient was also wearing a mask.     History taken from: patient chart    Objective      Vital Signs  Temp:  [98 °F (36.7 °C)-98.3 °F (36.8 °C)] 98.1 °F (36.7 °C)  Heart Rate:  [84-95] 86  Resp:  [13-18] 18  BP: (135-185)/() 161/84  Body mass index is 24.8 kg/m².    Intake/Output last 3 shifts:  I/O last 3 completed shifts:  In: 120 [P.O.:120]  Out: 900 [Urine:900]    Intake/Output this shift:  No intake/output data recorded.    Physical    GENERAL: No acute distress. Appears well nourished. Appears stated age.    NEURO: AAOx3. EOMI. PERRL. CNi. Strength 5/5 in all extremities. Sensation intact to light touch. Reflexes 2+ throughout.  Tenderness to palpation mid upper lumbar spine      Results Review:  I reviewed the patient's new clinical results.  I reviewed the patient's new imaging results and agree with the interpretation.    Labs:    Lab Results (last 24 hours)       Procedure Component Value Units Date/Time    Respiratory Panel PCR w/COVID-19(SARS-CoV-2) LIS/BENITO/PATRICE/PAD/COR/MIRIAM In-House, NP Swab in UTM/VTM, 2 HR TAT - Swab, Nasopharynx [[id number]]  (Normal) Collected: 05/22/25 1518    Specimen: Swab from Nasopharynx Updated: 05/22/25 1616     ADENOVIRUS, PCR Not Detected     Coronavirus 229E Not Detected     Coronavirus HKU1 Not Detected     Coronavirus NL63 Not Detected     Coronavirus OC43 Not Detected     COVID19 Not Detected     Human Metapneumovirus Not Detected     Human Rhinovirus/Enterovirus Not Detected     Influenza A PCR Not Detected     Influenza B PCR Not Detected      Parainfluenza Virus 1 Not Detected     Parainfluenza Virus 2 Not Detected     Parainfluenza Virus 3 Not Detected     Parainfluenza Virus 4 Not Detected     RSV, PCR Not Detected     Bordetella pertussis pcr Not Detected     Bordetella parapertussis PCR Not Detected     Chlamydophila pneumoniae PCR Not Detected     Mycoplasma pneumo by PCR Not Detected    Narrative:      In the setting of a positive respiratory panel with a viral infection PLUS a negative procalcitonin without other underlying concern for bacterial infection, consider observing off antibiotics or discontinuation of antibiotics and continue supportive care. If the respiratory panel is positive for atypical bacterial infection (Bordetella pertussis, Chlamydophila pneumoniae, or Mycoplasma pneumoniae), consider antibiotic de-escalation to target atypical bacterial infection.    POC Glucose Once [[id number]]  (Abnormal) Collected: 05/22/25 2129    Specimen: Blood Updated: 05/22/25 2130     Glucose 134 mg/dL      Comment: Serial Number: [id number]:  [id number]       Basic Metabolic Panel [[id number]]  (Abnormal) Collected: 05/23/25 0048    Specimen: Blood Updated: 05/23/25 0157     Glucose 134 mg/dL      BUN 12 mg/dL      Creatinine 0.49 mg/dL      Sodium 141 mmol/L      Potassium 3.3 mmol/L      Comment: Specimen hemolyzed.  Result may be falsely elevated.        Chloride 109 mmol/L      CO2 21.0 mmol/L      Calcium 8.2 mg/dL      BUN/Creatinine Ratio 24.5     Anion Gap 11.0 mmol/L      eGFR 108.1 mL/min/1.73     Narrative:      GFR Categories in Chronic Kidney Disease (CKD)              GFR Category          GFR (mL/min/1.73)    Interpretation  G1                    90 or greater        Normal or high (1)  G2                    60-89                Mild decrease (1)  G3a                   45-59                Mild to moderate decrease  G3b                   30-44                Moderate to severe decrease  G4                    15-29                 Severe decrease  G5                    14 or less           Kidney failure    (1)In the absence of evidence of kidney disease, neither GFR category G1 or G2 fulfill the criteria for CKD.    eGFR calculation 2021 CKD-EPI creatinine equation, which does not include race as a factor    CBC Auto Differential [[id number]]  (Abnormal) Collected: 05/23/25 0048    Specimen: Blood Updated: 05/23/25 0141     WBC 16.33 10*3/mm3      RBC 5.16 10*6/mm3      Hemoglobin 14.8 g/dL      Hematocrit 46.8 %      MCV 90.7 fL      MCH 28.7 pg      MCHC 31.6 g/dL      RDW 13.1 %      RDW-SD 44.0 fl      MPV 10.2 fL      Platelets 219 10*3/mm3      Neutrophil % 82.1 %      Lymphocyte % 11.2 %      Monocyte % 5.6 %      Eosinophil % 0.1 %      Basophil % 0.2 %      Immature Grans % 0.8 %      Neutrophils, Absolute 13.42 10*3/mm3      Lymphocytes, Absolute 1.83 10*3/mm3      Monocytes, Absolute 0.91 10*3/mm3      Eosinophils, Absolute 0.01 10*3/mm3      Basophils, Absolute 0.03 10*3/mm3      Immature Grans, Absolute 0.13 10*3/mm3      nRBC 0.0 /100 WBC     Scan Slide [[id number]] Collected: 05/23/25 0048    Specimen: Blood Updated: 05/23/25 0142     RBC Morphology Normal     WBC Morphology Normal     Platelet Estimate Adequate    POC Glucose 4x Daily Before Meals & at Bedtime [[id number]]  (Abnormal) Collected: 05/23/25 0728    Specimen: Blood Updated: 05/23/25 0729     Glucose 159 mg/dL      Comment: Serial Number: [id number]:  [id number]       Urinalysis With Culture If Indicated - Urine, Clean Catch [[id number]]  (Abnormal) Collected: 05/23/25 0742    Specimen: Urine, Clean Catch Updated: 05/23/25 0756     Color, UA Yellow     Appearance, UA Cloudy     pH, UA 5.5     Specific Gravity, UA 1.037     Glucose, UA >=1000 mg/dL (3+)     Ketones, UA 80 mg/dL (3+)     Bilirubin, UA Negative     Blood, UA Trace     Protein,  mg/dL (2+)     Leuk Esterase, UA Negative     Nitrite, UA Negative     Urobilinogen, UA 0.2 E.U./dL    Narrative:       In absence of clinical symptoms, the presence of pyuria, bacteria, and/or nitrites on the urinalysis result does not correlate with infection.    Urinalysis, Microscopic Only - Urine, Clean Catch [[id number]]  (Abnormal) Collected: 05/23/25 0742    Specimen: Urine, Clean Catch Updated: 05/23/25 0824     RBC, UA 0-2 /HPF      WBC, UA 11-20 /HPF      Bacteria, UA 3+ /HPF      Squamous Epithelial Cells, UA 0-2 /HPF      Hyaline Casts, UA 0-2 /LPF      Methodology Manual Light Microscopy    Urine Culture - Urine, Urine, Clean Catch [[id number]] Collected: 05/23/25 0742    Specimen: Urine, Clean Catch Updated: 05/23/25 0824            Imaging:    CT: CT of the lumbar spine was reviewed and shows acute mild wedge compression deformity along L1 with approximate 30% loss of vertebral height.  Minimal bony retropulsion.  No subluxation.  No apparent high-grade canal stenosis at fracture site.  No apparent posterior element involvement.    Current Medications:   Scheduled Meds:amLODIPine, 5 mg, Oral, Q24H  apixaban, 2.5 mg, Oral, BID  atorvastatin, 80 mg, Oral, Daily  buPROPion XL, 300 mg, Oral, Daily  clopidogrel, 75 mg, Oral, Daily  DULoxetine, 40 mg, Oral, Daily  empagliflozin, 10 mg, Oral, Daily  insulin lispro, 2-7 Units, Subcutaneous, 4x Daily AC & at Bedtime  isosorbide mononitrate, 60 mg, Oral, Daily  metoprolol tartrate, 100 mg, Oral, BID  Scopolamine, 1 patch, Transdermal, Q72H  sodium chloride, 10 mL, Intravenous, Q12H      Continuous Infusions:     Assessment & Plan       Lumbar compression fracture, closed, initial encounter      Assessment/Plan:  Blossom Martin is a 59 y.o. female with ground-level fall and resultant mild stable L1 compression fracture.  CT does show fracture with no apparent extension into the posterior elements.  She has no acute radicular symptoms.  Her brace does fit well.  She is recommended to continue with her bracing and follow-up in clinic in 6 weeks with surveillance imaging to rule  out any progressive deformity.  Patient is agreeable to plan of care and wishes to proceed    L1 compression fracture    -TLSO brace when out of bed  - Pain management per primary  - Follow-up in 6 weeks in clinic with surveillance imaging  - Please call for any questions or concerns.    Assessment findings were discussed with Dr. Richardson who agrees with plan of care.    Martha Garcia DNP, APRN  05/23/25  11:43 EDT

## 2025-05-23 NOTE — THERAPY EVALUATION
Patient Name: Blossmo Martin  : 1965    MRN: [id number]                              Today's Date: 2025       Admit Date: 2025    Visit Dx:     ICD-10-CM ICD-9-CM   1. Compression fracture of L1 vertebra, initial encounter  S32.010A 805.4   2. Injury of head, initial encounter  S09.90XA 959.01   3. Chest pain, unspecified type  R07.9 786.50   4. Lumbar compression fracture, closed, initial encounter  S32.000A 805.4     Patient Active Problem List   Diagnosis    Arterial stent thrombosis, initial encounter    Right leg pain    Diabetes mellitus    Hyperlipidemia    Tobacco abuse    CAD (coronary artery disease)    Hypertension    Anxiety disorder    History of DVT (deep vein thrombosis)    Altered mental status    Hallucinations    Altered mental status, unspecified altered mental status type    Diabetic ulcer of left foot associated with type 2 diabetes mellitus, limited to breakdown of skin    Lumbar compression fracture, closed, initial encounter     Past Medical History:   Diagnosis Date    CAD (coronary artery disease)     Diabetes     DVT (deep venous thrombosis)     Hypertension      Past Surgical History:   Procedure Laterality Date    ANGIOPLASTY  2022    failed peripheral angioplasty by Dr Kurtz    ANGIOPLASTY ILIAC ARTERY Bilateral 2022    Procedure: BILATERAL COMMON ILIAC ARTERY ANGIOPLASTY AND STENT PLACEMENT, RIGHT EXTERNAL ILIAC ARTERY ANGIOPLASTY AND STENT, RIGHT EXTERNAL ILIAC ARTERY DRUG COATED BALLOON ANGIOPLASTY AND RIGHT LOWER EXTREMITY ANGIOGRAM;  Surgeon: Shirley Kurtz MD;  Location: [facility] OR;  Service: Vascular;  Laterality: Bilateral;    CARDIAC CATHETERIZATION N/A 2022    Procedure: Thrombolysis-peripheral;  Surgeon: Shirley Kurtz MD;  Location: [facility] CATH INVASIVE LOCATION;  Service: Cardiovascular;  Laterality: N/A;    ILIAC ARTERY STENT Bilateral     At [facility]    LEFT HEART CATH        General Information       Row Name  05/23/25 1212          Physical Therapy Time and Intention    Document Type evaluation  -CR     Mode of Treatment physical therapy  -CR       Row Name 05/23/25 1212          General Information    Patient Profile Reviewed yes  -CR     Prior Level of Function independent:;all household mobility;ADL's  -CR     Existing Precautions/Restrictions fall;brace worn when out of bed  -CR       Row Name 05/23/25 1212          Living Environment    Current Living Arrangements apartment  -CR     People in Home alone  -CR       Row Name 05/23/25 1212          Cognition    Orientation Status (Cognition) oriented x 3  -CR       Row Name 05/23/25 1212          Safety Issues/Impairments Affecting Functional Mobility    Safety Issues Affecting Function (Mobility) insight into deficits/self-awareness;at risk behavior observed  -CR               User Key  (r) = Recorded By, (t) = Taken By, (c) = Cosigned By      Initials Name Provider Type    CR Reyes, Carmela, PT Physical Therapist                   Mobility       Row Name 05/23/25 1213          Bed Mobility    Bed Mobility supine-sit-supine  -CR     Supine-Sit-Supine Mobile (Bed Mobility) verbal cues  -CR     Comment, (Bed Mobility) log roll technique  -CR       Row Name 05/23/25 1213          Sit-Stand Transfer    Sit-Stand Mobile (Transfers) standby assist  -CR       Row Name 05/23/25 1213          Gait/Stairs (Locomotion)    Mobile Level (Gait) standby assist  -CR     Distance in Feet (Gait) 50  -CR     Deviations/Abnormal Patterns (Gait) gait speed decreased  -CR               User Key  (r) = Recorded By, (t) = Taken By, (c) = Cosigned By      Initials Name Provider Type    CR Reyes, Carmela, PT Physical Therapist                   Obj/Interventions       Row Name 05/23/25 1214          Range of Motion Comprehensive    General Range of Motion bilateral lower extremity ROM WFL  -CR     Comment, General Range of Motion spinal precautions, no trunk ROM tested  -CR        Row Name 05/23/25 1214          Strength Comprehensive (MMT)    Comment, General Manual Muscle Testing (MMT) Assessment BLE wfl  -CR       Row Name 05/23/25 1214          Balance    Balance Assessment sitting static balance;sitting dynamic balance;standing static balance;standing dynamic balance  -CR     Static Sitting Balance independent  -CR     Dynamic Sitting Balance standby assist  -CR     Position, Sitting Balance sitting edge of bed  -CR     Static Standing Balance standby assist  -CR     Dynamic Standing Balance standby assist  -CR       Row Name 05/23/25 1214          Sensory Assessment (Somatosensory)    Sensory Assessment (Somatosensory) sensation intact  -CR               User Key  (r) = Recorded By, (t) = Taken By, (c) = Cosigned By      Initials Name Provider Type    CR Reyes, Carmela, PT Physical Therapist                   Goals/Plan    No documentation.                  Clinical Impression       Row Name 05/23/25 1215          Pain    Pain Side/Orientation generalized  -CR     Additional Documentation Pain Scale: FACES Pre/Post-Treatment (Group)  -CR       Row Name 05/23/25 1215          Pain Scale: FACES Pre/Post-Treatment    Pain: FACES Scale, Pretreatment 2-->hurts little bit  -CR     Posttreatment Pain Rating 2-->hurts little bit  -CR     Pre/Posttreatment Pain Comment patient reporting more of quesiness versus pain  -CR       Row Name 05/23/25 1215          Plan of Care Review    Plan of Care Reviewed With patient  -CR     Outcome Evaluation 58 y/o female brought in hospital following fall at home with report of back pain; found to have new L1 compression fx and issued with TLSO, as well as scalp hematoma. Pmh includes CAD, htn. Patient lives alone and normally independent without a.d. At time of eval, patient able to perform supine <>sit with verbal cues for log roll technique. Patient needed minimal assistance to don TLSO. Patient able to perform transfers with SBA , no dizziness  reported. Patient able to ambulate 50 ft with CG/SBA . No veering, no loss of balance noted. Patient noted to have drop in BP from sitting to standing, BP sitting 144/90, standing 109/72 however returned to 148/82 after gait. Patient only c/o queasiness during activity. Patient is safe to return home with family supervision/assistance for household tasks as patient will have spine restrictions and will need monitoring for possible concussion.  -CR       Row Name 05/23/25 1215          Therapy Assessment/Plan (PT)    Patient/Family Therapy Goals Statement (PT) home  -CR     Criteria for Skilled Interventions Met (PT) no;does not meet criteria for skilled intervention  -CR     Therapy Frequency (PT) evaluation only  -CR       Row Name 05/23/25 1215          Positioning and Restraints    Post Treatment Position bed  -CR     In Bed notified nsg;supine;call light within reach;exit alarm on  -CR               User Key  (r) = Recorded By, (t) = Taken By, (c) = Cosigned By      Initials Name Provider Type    CR Reyes, Carmela, PT Physical Therapist                   Outcome Measures       Row Name 05/23/25 1334 05/23/25 0800       How much help from another person do you currently need...    Turning from your back to your side while in flat bed without using bedrails? 4  -CR 4  -SM    Moving from lying on back to sitting on the side of a flat bed without bedrails? 4  -CR 4  -SM    Moving to and from a bed to a chair (including a wheelchair)? 4  -CR 3  -SM    Standing up from a chair using your arms (e.g., wheelchair, bedside chair)? 4  -CR 3  -SM    Climbing 3-5 steps with a railing? 3  -CR 2  -SM    To walk in hospital room? 3  -CR 2  -SM    AM-PAC 6 Clicks Score (PT) 22  -CR 18  -SM              User Key  (r) = Recorded By, (t) = Taken By, (c) = Cosigned By      Initials Name Provider Type    CR Reyes, Carmela, KAUSHAL Physical Therapist    Megan Hoffman, RN Registered Nurse                                 Physical Therapy  Education       Title: PT OT SLP Therapies (Resolved)       Topic: Physical Therapy (Resolved)       Point: Mobility training (Resolved)       Learner Progress:  Not documented in this visit.              Point: Home exercise program (Resolved)       Learner Progress:  Not documented in this visit.              Point: Body mechanics (Resolved)       Learner Progress:  Not documented in this visit.              Point: Precautions (Resolved)       Learner Progress:  Not documented in this visit.                                  PT Recommendation and Plan     Outcome Evaluation: 58 y/o female brought in hospital following fall at home with report of back pain; found to have new L1 compression fx and issued with TLSO, as well as scalp hematoma. Pmh includes CAD, htn. Patient lives alone and normally independent without a.d. At time of eval, patient able to perform supine <>sit with verbal cues for log roll technique. Patient needed minimal assistance to don TLSO. Patient able to perform transfers with SBA , no dizziness reported. Patient able to ambulate 50 ft with CG/SBA . No veering, no loss of balance noted. Patient noted to have drop in BP from sitting to standing, BP sitting 144/90, standing 109/72 however returned to 148/82 after gait. Patient only c/o queasiness during activity. Patient is safe to return home with family supervision/assistance for household tasks as patient will have spine restrictions and will need monitoring for possible concussion.     Time Calculation:         PT Charges       Row Name 05/23/25 1335             Time Calculation    Start Time 1031  -CR      Stop Time 1050  -CR      Time Calculation (min) 19 min  -CR      PT Received On 05/23/25  -CR         Time Calculation- PT    Total Timed Code Minutes- PT 0 minute(s)  -CR                User Key  (r) = Recorded By, (t) = Taken By, (c) = Cosigned By      Initials Name Provider Type    CR Reyes, Carmela, PT Physical Therapist                   Therapy Charges for Today       Code Description Service Date Service Provider Modifiers Qty    39939401514 HC PT EVAL LOW COMPLEXITY 4 5/23/2025 Reyes, Carmela, PT GP 1            PT G-Codes  AM-PAC 6 Clicks Score (PT): 22  PT Discharge Summary  Anticipated Discharge Disposition (PT): home with assist, home with supervision    Carmela Reyes, PT  5/23/2025

## 2025-05-23 NOTE — PLAN OF CARE
Goal Outcome Evaluation:              Outcome Evaluation: 59 y.o. female with a CMH of coronary artery disease, diabetes mellitus, hypertension.  Who presented to [facility] on 5/22/2025 with back pain status post fall.  CT head shows moderate size occipital scalp hematoma, no acute intracranial abnormalities.  Lumbar spine x-ray showed new anterior wedging compression fracture at the superior endplate of L1 with approximately 30% loss of height anteriorly.  Pt is to wear TLSO brace for 12 weeks. At baseline pt lives at home alone and does not use AD.  This date pt reports significant pain, nausea, and dizziness.  Vitals WFL and pt sat EOB with superivisoin and cues for log rolling. She was educated on spine precautions and is able to complete lower body dressing using figure 4 technique to prevent bending of spine.  She was unable to tolerate more than standing EOB due to dizziness, pain, and nausea, though anticipate she will progress in order to return home with assist as able.  Pt has since discharged from facility.

## 2025-05-23 NOTE — CASE MANAGEMENT/SOCIAL WORK
Case Management Discharge Note      Final Note: Home.    Selected Continued Care - Discharged on 5/23/2025 Admission date: 5/22/2025 - Discharge disposition: Home or Self Care       Transportation Services  Private: Car    Final Discharge Disposition Code: 01 - home or self-care

## 2025-05-23 NOTE — NURSING NOTE
Patient and daughter at bedside, RN provided dc instructions and offered support. PIV discontinued, no further questions. Daughter transporting patient home.

## 2025-05-23 NOTE — THERAPY EVALUATION
Patient Name: Blossom Martin  : 1965    MRN: [id number]                              Today's Date: 2025       Admit Date: 2025    Visit Dx:     ICD-10-CM ICD-9-CM   1. Compression fracture of L1 vertebra, initial encounter  S32.010A 805.4   2. Injury of head, initial encounter  S09.90XA 959.01   3. Chest pain, unspecified type  R07.9 786.50   4. Lumbar compression fracture, closed, initial encounter  S32.000A 805.4     Patient Active Problem List   Diagnosis    Arterial stent thrombosis, initial encounter    Right leg pain    Diabetes mellitus    Hyperlipidemia    Tobacco abuse    CAD (coronary artery disease)    Hypertension    Anxiety disorder    History of DVT (deep vein thrombosis)    Altered mental status    Hallucinations    Altered mental status, unspecified altered mental status type    Diabetic ulcer of left foot associated with type 2 diabetes mellitus, limited to breakdown of skin    Lumbar compression fracture, closed, initial encounter     Past Medical History:   Diagnosis Date    CAD (coronary artery disease)     Diabetes     DVT (deep venous thrombosis)     Hypertension      Past Surgical History:   Procedure Laterality Date    ANGIOPLASTY  2022    failed peripheral angioplasty by Dr Kurtz    ANGIOPLASTY ILIAC ARTERY Bilateral 2022    Procedure: BILATERAL COMMON ILIAC ARTERY ANGIOPLASTY AND STENT PLACEMENT, RIGHT EXTERNAL ILIAC ARTERY ANGIOPLASTY AND STENT, RIGHT EXTERNAL ILIAC ARTERY DRUG COATED BALLOON ANGIOPLASTY AND RIGHT LOWER EXTREMITY ANGIOGRAM;  Surgeon: Shirley Kurtz MD;  Location: [facility] OR;  Service: Vascular;  Laterality: Bilateral;    CARDIAC CATHETERIZATION N/A 2022    Procedure: Thrombolysis-peripheral;  Surgeon: Shirley Kurtz MD;  Location: [facility] CATH INVASIVE LOCATION;  Service: Cardiovascular;  Laterality: N/A;    ILIAC ARTERY STENT Bilateral     At [facility]    LEFT HEART CATH        General Information       Row Name  05/23/25 1614          OT Time and Intention    Document Type evaluation  -SR     Mode of Treatment occupational therapy  -SR       Row Name 05/23/25 1614          Occupational Profile    Reason for Services/Referral (Occupational Profile) 59 y.o. female with a CMH of coronary artery disease, diabetes mellitus, hypertension.  Who presented to [facility] on 5/22/2025 with back pain status post fall.  CT head shows moderate size occipital scalp hematoma, no acute intracranial abnormalities.  Lumbar spine x-ray showed new anterior wedging compression fracture at the superior endplate of L1 with approximately 30% loss of height anteriorly.  Pt is to wear TLSO brace for 12 weeks. At baseline pt lives at home alone and does not use AD.  -SR       Row Name 05/23/25 1614          Living Environment    Current Living Arrangements apartment  -SR     People in Home alone  -SR       Row Name 05/23/25 1614          Cognition    Orientation Status (Cognition) oriented x 3  6/28 on SBT indicating qustionable cognitive impairment  -SR               User Key  (r) = Recorded By, (t) = Taken By, (c) = Cosigned By      Initials Name Provider Type    SR Ivory Marcelo OT Occupational Therapist                     Mobility/ADL's       Row Name 05/23/25 1615          Bed Mobility    Bed Mobility supine-sit-supine  -SR     Supine-Sit-Supine [place] (Bed Mobility) verbal cues;supervision  log roll technique  -SR       Row Name 05/23/25 1615          Sit-Stand Transfer    Sit-Stand [place] (Transfers) contact guard  -SR       Row Name 05/23/25 1615          Functional Mobility    Functional Mobility- Comment Unable to attempt this date due to pain, nausea, and dizziness.  BP sitting at EOB is WFL.  -SR       Row Name 05/23/25 1615          Activities of Daily Living    BADL Assessment/Intervention upper body dressing  -SR       Row Name 05/23/25 1615          Upper Body Dressing Assessment/Training     Eads Level (Upper Body Dressing) upper body dressing skills;maximum assist (25% patient effort)  -SR     Comment, (Upper Body Dressing) Donning brace  -SR               User Key  (r) = Recorded By, (t) = Taken By, (c) = Cosigned By      Initials Name Provider Type    SR Ivory Marcelo OT Occupational Therapist                   Obj/Interventions       Row Name 05/23/25 1618          Range of Motion Comprehensive    General Range of Motion no range of motion deficits identified  -SR       Row Name 05/23/25 1618          Strength Comprehensive (MMT)    General Manual Muscle Testing (MMT) Assessment no strength deficits identified  -SR     Comment, General Manual Muscle Testing (MMT) Assessment BUE grossly WFL with functional activity  -SR       Row Name 05/23/25 1618          Balance    Static Sitting Balance independent  -SR     Dynamic Sitting Balance supervision  -SR     Static Standing Balance contact guard  -SR     Balance Interventions sitting;standing;sit to stand;supported;static  -SR               User Key  (r) = Recorded By, (t) = Taken By, (c) = Cosigned By      Initials Name Provider Type    SR Ivory Marcelo OT Occupational Therapist                   Goals/Plan    No documentation.                  Clinical Impression       Row Name 05/23/25 1619          Pain Scale: FACES Pre/Post-Treatment    Pain: FACES Scale, Pretreatment 2-->hurts little bit  -SR     Posttreatment Pain Rating 2-->hurts little bit  -SR       Row Name 05/23/25 1619          Plan of Care Review    Outcome Evaluation 59 y.o. female with a CMH of coronary artery disease, diabetes mellitus, hypertension.  Who presented to [facility] on 5/22/2025 with back pain status post fall.  CT head shows moderate size occipital scalp hematoma, no acute intracranial abnormalities.  Lumbar spine x-ray showed new anterior wedging compression fracture at the superior endplate of L1 with approximately 30% loss of height  anteriorly.  Pt is to wear TLSO brace for 12 weeks. At baseline pt lives at home alone and does not use AD.  This date pt reports significant pain, nausea, and dizziness.  Vitals WFL and pt sat EOB with superivisoin and cues for log rolling. She was educated on spine precautions and is able to complete lower body dressing using figure 4 technique to prevent bending of spine.  She was unable to tolerate more than standing EOB due to dizziness, pain, and nausea, though anticipate she will progress in order to return home with assist as able.  Pt has since discharged from facility.  -SR       Row Name 05/23/25 1619          Therapy Assessment/Plan (OT)    Therapy Frequency (OT) evaluation only  -SR       Row Name 05/23/25 1619          Positioning and Restraints    Pre-Treatment Position in bed  -SR     Post Treatment Position bed  -SR     In Bed sitting EOB;call light within reach;encouraged to call for assist  -SR               User Key  (r) = Recorded By, (t) = Taken By, (c) = Cosigned By      Initials Name Provider Type    SR Ivory Marcelo, OT Occupational Therapist                   Outcome Measures       Row Name 05/23/25 1334 05/23/25 0800       How much help from another person do you currently need...    Turning from your back to your side while in flat bed without using bedrails? 4  -CR 4  -SM    Moving from lying on back to sitting on the side of a flat bed without bedrails? 4  -CR 4  -SM    Moving to and from a bed to a chair (including a wheelchair)? 4  -CR 3  -SM    Standing up from a chair using your arms (e.g., wheelchair, bedside chair)? 4  -CR 3  -SM    Climbing 3-5 steps with a railing? 3  -CR 2  -SM    To walk in hospital room? 3  -CR 2  -SM    AM-PAC 6 Clicks Score (PT) 22  -CR 18  -SM              User Key  (r) = Recorded By, (t) = Taken By, (c) = Cosigned By      Initials Name Provider Type    CR Reyes, Carmela, PT Physical Therapist    Megan Hoffman, RN Registered Nurse                     Occupational Therapy Education       Title: PT OT SLP Therapies (Done)       Topic: Occupational Therapy (Done)       Point: Precautions (Done)       Learning Progress Summary            Patient Acceptance, E,TB, VU by SR at 5/23/2025 1623                      Point: Body mechanics (Done)       Learning Progress Summary            Patient Acceptance, E,TB, VU by SR at 5/23/2025 1623                                      User Key       Initials Effective Dates Name Provider Type Discipline     06/16/21 -  Ivory Marcelo OT Occupational Therapist OT                  OT Recommendation and Plan  Therapy Frequency (OT): evaluation only  Plan of Care Review  Outcome Evaluation: 59 y.o. female with a CMH of coronary artery disease, diabetes mellitus, hypertension.  Who presented to [facility] on 5/22/2025 with back pain status post fall.  CT head shows moderate size occipital scalp hematoma, no acute intracranial abnormalities.  Lumbar spine x-ray showed new anterior wedging compression fracture at the superior endplate of L1 with approximately 30% loss of height anteriorly.  Pt is to wear TLSO brace for 12 weeks. At baseline pt lives at home alone and does not use AD.  This date pt reports significant pain, nausea, and dizziness.  Vitals WFL and pt sat EOB with superivisoin and cues for log rolling. She was educated on spine precautions and is able to complete lower body dressing using figure 4 technique to prevent bending of spine.  She was unable to tolerate more than standing EOB due to dizziness, pain, and nausea, though anticipate she will progress in order to return home with assist as able.  Pt has since discharged from facility.     Time Calculation:         Time Calculation- OT       Row Name 05/23/25 1624             Time Calculation- OT    OT Start Time 0823  -SR      OT Stop Time 0843  -SR      OT Time Calculation (min) 20 min  -SR      Total Timed Code Minutes- OT 0 minute(s)  -SR       OT Received On 05/23/25  -SR                User Key  (r) = Recorded By, (t) = Taken By, (c) = Cosigned By      Initials Name Provider Type    SR Ivory Marcelo OT Occupational Therapist                  Therapy Charges for Today       Code Description Service Date Service Provider Modifiers Qty    [id number]  OT EVAL LOW COMPLEXITY 4 5/23/2025 Ivory Marcelo OT GO 1                 Ivory Marcelo OT  5/23/2025

## 2025-05-23 NOTE — PLAN OF CARE
Problem: Adult Inpatient Plan of Care  Goal: Absence of Hospital-Acquired Illness or Injury  Intervention: Prevent Skin Injury  Recent Flowsheet Documentation  Taken 5/23/2025 0800 by Megan Trammell, RN  Skin Protection:   skin sealant/moisture barrier applied   transparent dressing maintained   Goal Outcome Evaluation:  Plan of Care Reviewed With: patient        Progress: improving

## 2025-05-25 LAB — BACTERIA SPEC AEROBE CULT: ABNORMAL

## 2025-05-27 ENCOUNTER — HOSPITAL ENCOUNTER (EMERGENCY)
Facility: HOSPITAL | Age: 60
Discharge: HOME | End: 2025-05-27
Attending: EMERGENCY MEDICINE | Admitting: EMERGENCY MEDICINE
Payer: MEDICARE

## 2025-05-27 ENCOUNTER — APPOINTMENT (OUTPATIENT)
Dept: GENERAL RADIOLOGY | Facility: HOSPITAL | Age: 60
End: 2025-05-27
Payer: MEDICARE

## 2025-05-27 VITALS
SYSTOLIC BLOOD PRESSURE: 127 MMHG | HEART RATE: 86 BPM | WEIGHT: 134.1 LBS | TEMPERATURE: 98.4 F | HEIGHT: 63 IN | BODY MASS INDEX: 23.76 KG/M2 | RESPIRATION RATE: 16 BRPM | OXYGEN SATURATION: 97 % | DIASTOLIC BLOOD PRESSURE: 75 MMHG

## 2025-05-27 DIAGNOSIS — K59.03 DRUG-INDUCED CONSTIPATION: ICD-10-CM

## 2025-05-27 DIAGNOSIS — R53.83 FATIGUE, UNSPECIFIED TYPE: ICD-10-CM

## 2025-05-27 DIAGNOSIS — R11.0 NAUSEA: ICD-10-CM

## 2025-05-27 DIAGNOSIS — F07.81 POSTCONCUSSIVE SYNDROME: ICD-10-CM

## 2025-05-27 DIAGNOSIS — S32.010D CLOSED COMPRESSION FRACTURE OF L1 LUMBAR VERTEBRA WITH ROUTINE HEALING, SUBSEQUENT ENCOUNTER: Primary | ICD-10-CM

## 2025-05-27 DIAGNOSIS — E87.1 HYPONATREMIA: ICD-10-CM

## 2025-05-27 LAB
ALBUMIN SERPL-MCNC: 3.9 G/DL (ref 3.5–5.2)
ALBUMIN/GLOB SERPL: 1.7 G/DL
ALP SERPL-CCNC: 73 U/L (ref 39–117)
ALT SERPL W P-5'-P-CCNC: 10 U/L (ref 1–33)
ANION GAP SERPL CALCULATED.3IONS-SCNC: 13.4 MMOL/L (ref 5–15)
AST SERPL-CCNC: 14 U/L (ref 1–32)
BASOPHILS # BLD AUTO: 0.01 10*3/MM3 (ref 0–0.2)
BASOPHILS NFR BLD AUTO: 0.1 % (ref 0–1.5)
BILIRUB SERPL-MCNC: 0.4 MG/DL (ref 0–1.2)
BUN SERPL-MCNC: 34 MG/DL (ref 8–23)
BUN/CREAT SERPL: 60.7 (ref 7–25)
CALCIUM SPEC-SCNC: 9.2 MG/DL (ref 8.6–10.5)
CHLORIDE SERPL-SCNC: 94 MMOL/L (ref 98–107)
CO2 SERPL-SCNC: 23.6 MMOL/L (ref 22–29)
CREAT SERPL-MCNC: 0.56 MG/DL (ref 0.57–1)
DEPRECATED RDW RBC AUTO: 41.9 FL (ref 37–54)
EGFRCR SERPLBLD CKD-EPI 2021: 104.6 ML/MIN/1.73
EOSINOPHIL # BLD AUTO: 0.17 10*3/MM3 (ref 0–0.4)
EOSINOPHIL NFR BLD AUTO: 0.9 % (ref 0.3–6.2)
ERYTHROCYTE [DISTWIDTH] IN BLOOD BY AUTOMATED COUNT: 12.8 % (ref 12.3–15.4)
GEN 5 1HR TROPONIN T REFLEX: 18 NG/L
GLOBULIN UR ELPH-MCNC: 2.3 GM/DL
GLUCOSE SERPL-MCNC: 257 MG/DL (ref 65–99)
HCT VFR BLD AUTO: 50.6 % (ref 34–46.6)
HGB BLD-MCNC: 16.4 G/DL (ref 12–15.9)
IMM GRANULOCYTES # BLD AUTO: 0.12 10*3/MM3 (ref 0–0.05)
IMM GRANULOCYTES NFR BLD AUTO: 0.7 % (ref 0–0.5)
LYMPHOCYTES # BLD AUTO: 3.42 10*3/MM3 (ref 0.7–3.1)
LYMPHOCYTES NFR BLD AUTO: 18.9 % (ref 19.6–45.3)
MAGNESIUM SERPL-MCNC: 1.9 MG/DL (ref 1.6–2.4)
MCH RBC QN AUTO: 28.8 PG (ref 26.6–33)
MCHC RBC AUTO-ENTMCNC: 32.4 G/DL (ref 31.5–35.7)
MCV RBC AUTO: 88.8 FL (ref 79–97)
MONOCYTES # BLD AUTO: 1.54 10*3/MM3 (ref 0.1–0.9)
MONOCYTES NFR BLD AUTO: 8.5 % (ref 5–12)
NEUTROPHILS NFR BLD AUTO: 12.84 10*3/MM3 (ref 1.7–7)
NEUTROPHILS NFR BLD AUTO: 70.9 % (ref 42.7–76)
PLATELET # BLD AUTO: 389 10*3/MM3 (ref 140–450)
PMV BLD AUTO: 10.4 FL (ref 6–12)
POTASSIUM SERPL-SCNC: 3.4 MMOL/L (ref 3.5–5.2)
PROT SERPL-MCNC: 6.2 G/DL (ref 6–8.5)
QT INTERVAL: 359 MS
QTC INTERVAL: 437 MS
RBC # BLD AUTO: 5.7 10*6/MM3 (ref 3.77–5.28)
SODIUM SERPL-SCNC: 131 MMOL/L (ref 136–145)
TROPONIN T % DELTA: -5
TROPONIN T NUMERIC DELTA: -1 NG/L
TROPONIN T SERPL HS-MCNC: 19 NG/L
WBC NRBC COR # BLD AUTO: 18.1 10*3/MM3 (ref 3.4–10.8)

## 2025-05-27 PROCEDURE — 25010000002 ONDANSETRON PER 1 MG: Performed by: EMERGENCY MEDICINE

## 2025-05-27 PROCEDURE — 74018 RADEX ABDOMEN 1 VIEW: CPT

## 2025-05-27 PROCEDURE — 96361 HYDRATE IV INFUSION ADD-ON: CPT

## 2025-05-27 PROCEDURE — 36415 COLL VENOUS BLD VENIPUNCTURE: CPT

## 2025-05-27 PROCEDURE — 84484 ASSAY OF TROPONIN QUANT: CPT | Performed by: EMERGENCY MEDICINE

## 2025-05-27 PROCEDURE — 25810000003 SODIUM CHLORIDE 0.9 % SOLUTION: Performed by: EMERGENCY MEDICINE

## 2025-05-27 PROCEDURE — 93005 ELECTROCARDIOGRAM TRACING: CPT | Performed by: EMERGENCY MEDICINE

## 2025-05-27 PROCEDURE — 83735 ASSAY OF MAGNESIUM: CPT | Performed by: EMERGENCY MEDICINE

## 2025-05-27 PROCEDURE — 71045 X-RAY EXAM CHEST 1 VIEW: CPT

## 2025-05-27 PROCEDURE — 99284 EMERGENCY DEPT VISIT MOD MDM: CPT

## 2025-05-27 PROCEDURE — 99284 EMERGENCY DEPT VISIT MOD MDM: CPT | Performed by: EMERGENCY MEDICINE

## 2025-05-27 PROCEDURE — 80053 COMPREHEN METABOLIC PANEL: CPT | Performed by: EMERGENCY MEDICINE

## 2025-05-27 PROCEDURE — 96374 THER/PROPH/DIAG INJ IV PUSH: CPT

## 2025-05-27 PROCEDURE — 85025 COMPLETE CBC W/AUTO DIFF WBC: CPT | Performed by: EMERGENCY MEDICINE

## 2025-05-27 RX ORDER — ONDANSETRON 2 MG/ML
4 INJECTION INTRAMUSCULAR; INTRAVENOUS ONCE
Status: COMPLETED | OUTPATIENT
Start: 2025-05-27 | End: 2025-05-27

## 2025-05-27 RX ORDER — METOCLOPRAMIDE 10 MG/1
10 TABLET ORAL 3 TIMES DAILY PRN
Qty: 30 TABLET | Refills: 0 | Status: SHIPPED | OUTPATIENT
Start: 2025-05-27

## 2025-05-27 RX ADMIN — SODIUM CHLORIDE 1000 ML: 9 INJECTION, SOLUTION INTRAVENOUS at 10:46

## 2025-05-27 RX ADMIN — ONDANSETRON 4 MG: 2 INJECTION, SOLUTION INTRAMUSCULAR; INTRAVENOUS at 10:46

## 2025-05-27 NOTE — FSED PROVIDER NOTE
Subjective   History of Present Illness  Patient is a 59-year-old female who presents emergency room after she was hospitalized last week for a broken back.  Patient had an episode where she fell over, hit her head and broke her back.  She sustained an L1 compression fracture.  She had a head CT that was negative for intracranial hemorrhage.  Suspect that she had a concussion as she was complaining of nausea and vomiting after her head injury.  Patient was eventually discharged home from the hospital and prescribed Norco.  She reports several problems since returning home.    Family reports that she is not wearing her brace like she is supposed to.  They are concerned for healing of her lumbar spine compression fracture because of this.    Family also reports that she has lost approximately 10 pounds in a little over a week.  She states that she is nauseated and does not feel like eating.  They have recently tried to get her to take protein shakes.    Family is concerned that she is sleeping a lot.  She is only taken 3 of her Norco pills, but they report that she is sleeping up to 20 hours a day.    She is also constipated.  She reports that she has not had a good bowel movement since being discharged from the hospital.        Review of Systems   Constitutional:  Positive for fatigue. Negative for chills and fever.   Eyes: Negative.    Respiratory:  Negative for cough, chest tightness and shortness of breath.    Cardiovascular:  Negative for chest pain and palpitations.   Gastrointestinal:  Positive for constipation, nausea and vomiting. Negative for abdominal pain and diarrhea.   Genitourinary: Negative.    Musculoskeletal:  Positive for back pain.   Skin: Negative.  Negative for rash.   Neurological:  Positive for dizziness, weakness and headaches. Negative for syncope and numbness.   Psychiatric/Behavioral:  Positive for sleep disturbance.    All other systems reviewed and are negative.      Past Medical History:    Diagnosis Date    CAD (coronary artery disease)     Diabetes     DVT (deep venous thrombosis)     Hypertension        Allergies   Allergen Reactions    Aspirin GI Intolerance and Other (See Comments)     Nausea and vomiting       Hydrocodone Itching       Past Surgical History:   Procedure Laterality Date    ANGIOPLASTY  09/08/2022    failed peripheral angioplasty by Dr Kurtz    ANGIOPLASTY ILIAC ARTERY Bilateral 9/9/2022    Procedure: BILATERAL COMMON ILIAC ARTERY ANGIOPLASTY AND STENT PLACEMENT, RIGHT EXTERNAL ILIAC ARTERY ANGIOPLASTY AND STENT, RIGHT EXTERNAL ILIAC ARTERY DRUG COATED BALLOON ANGIOPLASTY AND RIGHT LOWER EXTREMITY ANGIOGRAM;  Surgeon: Shirley Kurtz MD;  Location: [facility] MAIN OR;  Service: Vascular;  Laterality: Bilateral;    CARDIAC CATHETERIZATION N/A 9/8/2022    Procedure: Thrombolysis-peripheral;  Surgeon: Shirley Kurtz MD;  Location: [facility] CATH INVASIVE LOCATION;  Service: Cardiovascular;  Laterality: N/A;    ILIAC ARTERY STENT Bilateral     At [facility]    LEFT HEART CATH         Family History   Problem Relation Age of Onset    Diabetes type II Mother     Heart disease Mother     Stroke Mother     Hypertension Mother     High cholesterol Mother     No Known Problems Father     Diabetes type II Maternal Grandfather        Social History     Socioeconomic History    Marital status:     Number of children: 1    Highest education level: High school graduate   Tobacco Use    Smoking status: Former     Current packs/day: 0.75     Average packs/day: 0.8 packs/day for 36.0 years (27.0 ttl pk-yrs)     Types: Cigarettes    Smokeless tobacco: Never   Vaping Use    Vaping status: Never Used   Substance and Sexual Activity    Alcohol use: Not Currently     Comment: ONCE A YEAR    Drug use: Never    Sexual activity: Defer           Objective   Physical Exam  Vitals and nursing note reviewed.   Constitutional:       General: She is not in acute distress.     Appearance:  Normal appearance. She is normal weight.   HENT:      Head: Normocephalic and atraumatic.      Right Ear: External ear normal.      Left Ear: External ear normal.      Nose: Nose normal.      Mouth/Throat:      Mouth: Mucous membranes are moist.   Eyes:      Extraocular Movements: Extraocular movements intact.      Conjunctiva/sclera: Conjunctivae normal.      Pupils: Pupils are equal, round, and reactive to light.   Cardiovascular:      Rate and Rhythm: Normal rate and regular rhythm.      Pulses: Normal pulses.      Heart sounds: Normal heart sounds. No murmur heard.     No friction rub. No gallop.   Pulmonary:      Effort: Pulmonary effort is normal. No respiratory distress.      Breath sounds: Normal breath sounds.   Abdominal:      General: Abdomen is flat. There is no distension.      Tenderness: There is no abdominal tenderness.   Musculoskeletal:         General: No deformity or signs of injury. Normal range of motion.      Cervical back: Normal range of motion and neck supple. No tenderness.   Skin:     General: Skin is warm.      Capillary Refill: Capillary refill takes less than 2 seconds.   Neurological:      General: No focal deficit present.      Mental Status: She is alert and oriented to person, place, and time. Mental status is at baseline.   Psychiatric:         Mood and Affect: Mood normal.         ECG 12 Lead      Date/Time: 5/27/2025 11:03 AM    Performed by: Tejas Wiseman MD  Authorized by: Tejas Wiseman MD  Rhythm: sinus rhythm  Rate: normal  BPM: 89  QRS axis: normal  Conduction: conduction normal  ST Depression: V4, V5, V6, V3, II, III and aVF  T Waves: T waves normal  Clinical impression: non-specific ECG               ED Course  ED Course as of 05/27/25 1252   Tue May 27, 2025   1103 Patient has ST segment depression in the lateral leads and compared to old EKG.  Nonspecific. [KZ]   1116 Initial troponin is 19. [KZ]   1116 CBC is unchanged from previous. [KZ]   1119 Hyponatremia is  noted on comprehensive metabolic panel. [KZ]   1126 KUB does not show anything worrisome. [KZ]   1131 Awaiting second troponin [KZ]   1153 Chest x-ray is clear.   [KZ]   1216 Patient's troponin not changed.  Offered admission, but declines.  Patient states that she does not want to be in the hospital. [KZ]      ED Course User Index  [KZ] Tejas Wiseman MD                                           Medical Decision Making  This patient presents with generalized weakness and fatigue.  Differential diagnosis includes infectious process, dehydration, severe metabolic derangements or electrolyte abnormalities, ischemia/ACS, heart failure, and intracranial/central processes.  Given his presentation and normal vital signs-cardiac ischemia/ACS, heart failure, intracranial/central process is considered less likely.  A work-up including cardiac evaluation was requested.    Given the patient's fatigue, I did consider cardiac etiology.  EKG was definitely different than 1 week ago.  Her troponins however ruled her out.  I did advise/offer admission to the hospital, but the patient adamantly declined.  She states that she does not want to go back there and she will follow-up with her cardiologist as an outpatient.  I am not going to make her sign AMA paperwork based on this decision.  She has had 2 unchanged troponins.  I have encouraged her to wear her back brace.  I written her some Reglan to help out with appetite and nausea.    Problems Addressed:  Closed compression fracture of L1 lumbar vertebra with routine healing, subsequent encounter: complicated acute illness or injury  Drug-induced constipation: complicated acute illness or injury  Fatigue, unspecified type: complicated acute illness or injury  Hyponatremia: complicated acute illness or injury  Nausea: complicated acute illness or injury  Postconcussive syndrome: complicated acute illness or injury    Amount and/or Complexity of Data Reviewed  Labs: ordered.  Decision-making details documented in ED Course.  Radiology: ordered. Decision-making details documented in ED Course.  ECG/medicine tests: ordered and independent interpretation performed. Decision-making details documented in ED Course.    Risk  Prescription drug management.  Decision regarding hospitalization.  Risk Details: Hospitalization recommended.        Final diagnoses:   Closed compression fracture of L1 lumbar vertebra with routine healing, subsequent encounter   Fatigue, unspecified type   Hyponatremia   Nausea   Drug-induced constipation   Postconcussive syndrome       ED Disposition  ED Disposition       ED Disposition   Discharge    Condition   Stable    Comment   --               Collin Low, APRN  [address] [facility] [place] [address] [phone]    Schedule an appointment as soon as possible for a visit   As needed, For repeat evaluation         Medication List        New Prescriptions      metoclopramide 10 MG tablet  Commonly known as: REGLAN  Take 1 tablet by mouth 3 (Three) Times a Day As Needed (Nausea).            Stop      HYDROcodone-acetaminophen  MG per tablet  Commonly known as: NORCO     Ozempic (2 MG/DOSE) 8 MG/3ML solution pen-injector  Generic drug: Semaglutide (2 MG/DOSE)               Where to Get Your Medications        These medications were sent to [facility] [id number] - [place], [place] - [address] [place] [address] [address]  - [phone]  - [phone] [address] [place] [address]      Phone: [phone]   metoclopramide 10 MG tablet

## 2025-07-07 ENCOUNTER — HOSPITAL ENCOUNTER (OUTPATIENT)
Dept: GENERAL RADIOLOGY | Facility: HOSPITAL | Age: 60
Discharge: HOME | End: 2025-07-07
Admitting: NURSE PRACTITIONER
Payer: MEDICARE

## 2025-07-07 DIAGNOSIS — S32.010A COMPRESSION FRACTURE OF L1 VERTEBRA, INITIAL ENCOUNTER: ICD-10-CM

## 2025-07-07 PROCEDURE — 72100 X-RAY EXAM L-S SPINE 2/3 VWS: CPT

## 2025-07-07 NOTE — PROGRESS NOTES
"Subjective   History of Present Illness: Blossom Martin is a 60 y.o. female is here today for follow-up.  Patient was initially seen and evaluated in the hospital for an L1 compression fracture with medical management recommended.  She is here today with imaging follow-up.  Patient reports that her back pain is significantly better.  She has been adhering to her bracing.  She reports no acute complaints.  She reports no leg pain or leg paresthesias.  History of Present Illness    The following portions of the patient's history were reviewed and updated as appropriate: allergies, current medications, past family history, past medical history, past social history, past surgical history, and problem list.    Review of Systems   Constitutional:  Positive for activity change.   HENT: Negative.     Eyes: Negative.    Respiratory: Negative.     Cardiovascular: Negative.    Gastrointestinal: Negative.    Endocrine: Negative.    Genitourinary: Negative.    Musculoskeletal:  Positive for arthralgias, back pain (ache,sharp/some-lower) and myalgias.   Skin: Negative.    Allergic/Immunologic: Negative.    Neurological: Negative.    Hematological: Negative.    Psychiatric/Behavioral:  Positive for sleep disturbance.    All other systems reviewed and are negative.      Objective     ./85   Pulse 68   Resp 18   Ht 160 cm (63\")   Wt 63.5 kg (140 lb)   SpO2 100%   BMI 24.80 kg/m²    Body mass index is 24.8 kg/m².    Vitals:    07/09/25 0958   PainSc: 3    PainLoc: Back          Physical Exam  Neurological Exam  Negative tenderness palpation      Assessment & Plan   Independent Review of Radiographic Studies:      I personally reviewed the images from the following studies.    Lumbar x-ray 7/7/2025    Stable wedge compression deformity along L1.    Medical Decision Making:      Blossom Martinis a 60 y.o. female seen approximately 6 weeks ago in the hospital for back pain after a fall with radiographic evidence suggestive " of acute wedge compression deformity along L1.  Her surveillance imaging reviewed does show stable fracture along superior endplate of L1.  Alignment relatively well-preserved.  Her clinical presentation is bettering in regards to her pain with no radicular component.  At this juncture, I do feel that it is okay for patient to go to bracing as needed especially in the car.  I am recommending physical therapy to help with stretching and strengthening.  I also have ordered DEXA scan to evaluate her bone density quality.  We will follow-up in 6 weeks to see how she is doing.  Patient is agreeable to plan of care and wishes to proceed.  I encouraged her to call the office with any questions or concerns in the interim.        Diagnoses and all orders for this visit:    1. Lumbar compression fracture, closed, initial encounter (Primary)  -     DEXA Bone Density Axial; Future  -     XR Spine Lumbar Complete With Flex & Ext; Future  -     Ambulatory Referral to Physical Therapy for Evaluation & Treatment      Return in about 6 weeks (around 8/20/2025).    This patient was examined wearing appropriate personal protective equipment.     Blossom VENTURA Mario  reports that she has quit smoking. Her smoking use included cigarettes. She has a 27 pack-year smoking history. She has never used smokeless tobacco.      Body mass index is 24.8 kg/m².    BMI is within normal parameters. No other follow-up for BMI required.    Patient's blood pressure was reviewed.  Recommendations for  a low-salt diet and exercise to maintain/improve BP in addition to taking any presribed medications.             Martha Garcia DNP, APRN  07/09/25  10:54 EDT

## 2025-07-09 ENCOUNTER — OFFICE VISIT (OUTPATIENT)
Dept: NEUROSURGERY | Facility: CLINIC | Age: 60
End: 2025-07-09
Payer: MEDICARE

## 2025-07-09 VITALS
RESPIRATION RATE: 18 BRPM | DIASTOLIC BLOOD PRESSURE: 85 MMHG | HEIGHT: 63 IN | WEIGHT: 140 LBS | SYSTOLIC BLOOD PRESSURE: 154 MMHG | HEART RATE: 68 BPM | OXYGEN SATURATION: 100 % | BODY MASS INDEX: 24.8 KG/M2

## 2025-07-09 DIAGNOSIS — S32.000A LUMBAR COMPRESSION FRACTURE, CLOSED, INITIAL ENCOUNTER: Primary | ICD-10-CM

## 2025-07-09 RX ORDER — CETIRIZINE HYDROCHLORIDE 10 MG/1
TABLET ORAL EVERY 24 HOURS
COMMUNITY

## 2025-07-09 RX ORDER — SEMAGLUTIDE 2.68 MG/ML
INJECTION, SOLUTION SUBCUTANEOUS
COMMUNITY
Start: 2025-06-11 | End: 2026-05-13

## 2025-07-09 RX ORDER — GLIMEPIRIDE 2 MG/1
TABLET ORAL
COMMUNITY

## 2025-07-09 RX ORDER — DOCUSATE SODIUM 100 MG/1
CAPSULE, LIQUID FILLED ORAL EVERY 12 HOURS SCHEDULED
COMMUNITY
Start: 2025-06-21 | End: 2025-07-21

## 2025-07-09 RX ORDER — FLUCONAZOLE 150 MG/1
TABLET ORAL
COMMUNITY
Start: 2025-06-03

## 2025-07-15 ENCOUNTER — TELEPHONE (OUTPATIENT)
Dept: NEUROSURGERY | Facility: CLINIC | Age: 60
End: 2025-07-15

## 2025-08-08 ENCOUNTER — TELEPHONE (OUTPATIENT)
Dept: NEUROSURGERY | Facility: CLINIC | Age: 60
End: 2025-08-08
Payer: MEDICARE

## (undated) DEVICE — TRY CVC VANTEX PI PLS 7F 3L 20CM 50

## (undated) DEVICE — PENCL HND ROCKRSWTCH HOLSTR EZ CLEAN TP CRD 10FT

## (undated) DEVICE — FOGARTY ARTERIAL EMBOLECTOMY CATHETER 3F 80CM: Brand: FOGARTY

## (undated) DEVICE — SUT SILK 3/0 SH CR8 18IN C013D

## (undated) DEVICE — PINNACLE INTRODUCER SHEATH: Brand: PINNACLE

## (undated) DEVICE — SOL IRRIG NACL 1000ML

## (undated) DEVICE — SHEATH STEER INTRO TOURGUIDE 7F 9MM 45CM

## (undated) DEVICE — PK ATS CUST W CARDIOTOMY RESEVOIR

## (undated) DEVICE — FOAM BUMP, LARGE: Brand: MEDLINE INDUSTRIES, INC.

## (undated) DEVICE — DRAPE,HAND,STERILE: Brand: MEDLINE

## (undated) DEVICE — Device

## (undated) DEVICE — MAJOR VASCULAR PACK-LF: Brand: MEDLINE INDUSTRIES, INC.

## (undated) DEVICE — DEV ANGIO FLOSWITCH HP BX24

## (undated) DEVICE — PTA BALLOON DILATATION CATHETER: Brand: MUSTANG™

## (undated) DEVICE — EXTENSION SET, MALE LUER LOCK ADAPTER WITH RETRACTABLE COLLAR

## (undated) DEVICE — DEV INFL COMPAK W/ACCESSPLUS IN4530

## (undated) DEVICE — PROVE COVER: Brand: UNBRANDED

## (undated) DEVICE — LN INJ CONTRST FLXCIL HP F/M LL 1200PSI72

## (undated) DEVICE — CATH ANGIO TRCN NB VSCR .038 5F 65CM

## (undated) DEVICE — CATH OMNI FLUSH 5FR

## (undated) DEVICE — SOL NS 500ML

## (undated) DEVICE — SOLUTION,WATER,IRRIGATION,1000ML,STERILE: Brand: MEDLINE

## (undated) DEVICE — PINNACLE R/O II INTRODUCER SHEATH WITH RADIOPAQUE MARKER: Brand: PINNACLE

## (undated) DEVICE — KT SURG TURNOVER 050

## (undated) DEVICE — IRRIGATOR BULB ASEPTO 60CC STRL

## (undated) DEVICE — ST ACC MICROPUNCTURE STFF .018 ECHO/PLDM/TP 4F/10CM 21G/7CM

## (undated) DEVICE — PAD UNDERCAST WYTEX 4IN 4YD LF STRL

## (undated) DEVICE — SYS PERFUS SEP PLATLT W TIPS CUST

## (undated) DEVICE — UNDYED BRAIDED (POLYGLACTIN 910), SYNTHETIC ABSORBABLE SUTURE: Brand: COATED VICRYL

## (undated) DEVICE — APPL CHLORAPREP HI/LITE 26ML ORNG

## (undated) DEVICE — 3M™ STERI-DRAPE™ INSTRUMENT POUCH 9097: Brand: 3M™ STERI-DRAPE™

## (undated) DEVICE — TOTAL TRAY, 16FR 10ML SIL FOLEY, URN: Brand: MEDLINE

## (undated) DEVICE — SUT PROLN 5/0 RB1 D/A 36IN 8556H

## (undated) DEVICE — MICRO TIP WIPE: Brand: DEVON

## (undated) DEVICE — ST ACC MICROPUNCTURE STFF/CANN PLAT/TP 4F 21G 40CM

## (undated) DEVICE — SUT VIC 2/0 CT1 36IN

## (undated) DEVICE — SUT VIC 3/0 SH 27IN J416H

## (undated) DEVICE — GEL ULTRASND AQUASONIC 100 20GM STRL

## (undated) DEVICE — RADIFOCUS GLIDEWIRE ADVANTAGE GUIDEWIRE: Brand: GLIDEWIRE ADVANTAGE

## (undated) DEVICE — GLV SURG BIOGEL LTX PF 7 1/2

## (undated) DEVICE — VASCULAR CDS: Brand: MEDLINE INDUSTRIES, INC.

## (undated) DEVICE — RADIFOCUS TORQUE DEVICE MULTI-TORQUE VISE: Brand: RADIFOCUS TORQUE DEVICE

## (undated) DEVICE — SUT PROLN 6/0 BV1 D/A 30IN 8709H

## (undated) DEVICE — FOGARTY ARTERIAL EMBOLECTOMY CATHETER 4F 80CM: Brand: FOGARTY

## (undated) DEVICE — BLOOD TRANSFUSION FILTER: Brand: HAEMONETICS

## (undated) DEVICE — SYR INJ ILLUMENA W/SPK 150ML 1200PSI

## (undated) DEVICE — RADIFOCUS GLIDEWIRE: Brand: GLIDEWIRE

## (undated) DEVICE — GOWN,NON-REINFORCED,SIRUS,SET IN SLV,XXL: Brand: MEDLINE

## (undated) DEVICE — CATH SZ ACCUVU SEG/20CM PIG .038IN 5F 70CM

## (undated) DEVICE — ADHS SKIN PREMIERPRO EXOFIN TOPICAL HI/VISC .5ML

## (undated) DEVICE — PK MINOR VASC 50

## (undated) DEVICE — CATH SZ ACCUVU SEG/2CM PIG 4F 70CM

## (undated) DEVICE — SUT PROLN 5/0 C1 D/A 36IN 8720H